# Patient Record
Sex: MALE | Race: ASIAN | Employment: FULL TIME | URBAN - METROPOLITAN AREA
[De-identification: names, ages, dates, MRNs, and addresses within clinical notes are randomized per-mention and may not be internally consistent; named-entity substitution may affect disease eponyms.]

---

## 2021-02-04 ENCOUNTER — APPOINTMENT (OUTPATIENT)
Dept: CT IMAGING | Age: 61
DRG: 871 | End: 2021-02-04
Attending: EMERGENCY MEDICINE
Payer: COMMERCIAL

## 2021-02-04 ENCOUNTER — APPOINTMENT (OUTPATIENT)
Dept: GENERAL RADIOLOGY | Age: 61
DRG: 871 | End: 2021-02-04
Attending: EMERGENCY MEDICINE
Payer: COMMERCIAL

## 2021-02-04 ENCOUNTER — HOSPITAL ENCOUNTER (INPATIENT)
Age: 61
LOS: 5 days | Discharge: HOME | DRG: 871 | End: 2021-02-10
Attending: EMERGENCY MEDICINE | Admitting: HOSPITALIST
Payer: COMMERCIAL

## 2021-02-04 DIAGNOSIS — J96.01 ACUTE RESPIRATORY FAILURE WITH HYPOXIA (HCC): ICD-10-CM

## 2021-02-04 DIAGNOSIS — U07.1 2019 NOVEL CORONAVIRUS DISEASE (COVID-19): ICD-10-CM

## 2021-02-04 DIAGNOSIS — J96.01 ACUTE HYPOXEMIC RESPIRATORY FAILURE (HCC): ICD-10-CM

## 2021-02-04 DIAGNOSIS — J98.4 CAVITARY LUNG DISEASE: ICD-10-CM

## 2021-02-04 DIAGNOSIS — J98.4 CAVITARY LESION OF LUNG: ICD-10-CM

## 2021-02-04 DIAGNOSIS — J12.82 PNEUMONIA DUE TO COVID-19 VIRUS: Primary | ICD-10-CM

## 2021-02-04 DIAGNOSIS — U07.1 PNEUMONIA DUE TO COVID-19 VIRUS: Primary | ICD-10-CM

## 2021-02-04 LAB
25(OH)D3 SERPL-MCNC: 9.9 NG/ML (ref 30–100)
ALBUMIN SERPL-MCNC: 3 G/DL (ref 3.5–5)
ALBUMIN/GLOB SERPL: 0.5 {RATIO} (ref 1.2–3.5)
ALP SERPL-CCNC: 116 U/L (ref 50–136)
ALT SERPL-CCNC: 41 U/L (ref 12–65)
ANION GAP SERPL CALC-SCNC: 7 MMOL/L (ref 7–16)
AST SERPL-CCNC: 15 U/L (ref 15–37)
BASOPHILS # BLD: 0.1 K/UL (ref 0–0.2)
BASOPHILS NFR BLD: 1 % (ref 0–2)
BILIRUB SERPL-MCNC: 0.6 MG/DL (ref 0.2–1.1)
BUN SERPL-MCNC: 12 MG/DL (ref 6–23)
CALCIUM SERPL-MCNC: 9.8 MG/DL (ref 8.3–10.4)
CHLORIDE SERPL-SCNC: 94 MMOL/L (ref 98–107)
CO2 SERPL-SCNC: 27 MMOL/L (ref 21–32)
CREAT SERPL-MCNC: 0.93 MG/DL (ref 0.8–1.5)
CRP SERPL-MCNC: 13.1 MG/DL (ref 0–0.9)
D DIMER PPP FEU-MCNC: 0.42 UG/ML(FEU)
DIFFERENTIAL METHOD BLD: ABNORMAL
EOSINOPHIL # BLD: 0 K/UL (ref 0–0.8)
EOSINOPHIL NFR BLD: 0 % (ref 0.5–7.8)
ERYTHROCYTE [DISTWIDTH] IN BLOOD BY AUTOMATED COUNT: 13.4 % (ref 11.9–14.6)
FERRITIN SERPL-MCNC: 723 NG/ML (ref 8–388)
FIBRINOGEN PPP-MCNC: 859 MG/DL (ref 190–501)
GLOBULIN SER CALC-MCNC: 6.1 G/DL (ref 2.3–3.5)
GLUCOSE SERPL-MCNC: 257 MG/DL (ref 65–100)
HCT VFR BLD AUTO: 44.5 % (ref 41.1–50.3)
HGB BLD-MCNC: 14.1 G/DL (ref 13.6–17.2)
IMM GRANULOCYTES # BLD AUTO: 0.5 K/UL (ref 0–0.5)
IMM GRANULOCYTES NFR BLD AUTO: 5 % (ref 0–5)
LACTATE SERPL-SCNC: 2 MMOL/L (ref 0.4–2)
LDH SERPL L TO P-CCNC: 249 U/L (ref 100–190)
LYMPHOCYTES # BLD: 0.8 K/UL (ref 0.5–4.6)
LYMPHOCYTES NFR BLD: 7 % (ref 13–44)
MAGNESIUM SERPL-MCNC: 2.2 MG/DL (ref 1.8–2.4)
MCH RBC QN AUTO: 23.9 PG (ref 26.1–32.9)
MCHC RBC AUTO-ENTMCNC: 31.7 G/DL (ref 31.4–35)
MCV RBC AUTO: 75.6 FL (ref 79.6–97.8)
MONOCYTES # BLD: 0.8 K/UL (ref 0.1–1.3)
MONOCYTES NFR BLD: 7 % (ref 4–12)
NEUTS SEG # BLD: 8.6 K/UL (ref 1.7–8.2)
NEUTS SEG NFR BLD: 79 % (ref 43–78)
NRBC # BLD: 0 K/UL (ref 0–0.2)
PLATELET # BLD AUTO: 484 K/UL (ref 150–450)
PMV BLD AUTO: 9.2 FL (ref 9.4–12.3)
POTASSIUM SERPL-SCNC: 3.9 MMOL/L (ref 3.5–5.1)
PROCALCITONIN SERPL-MCNC: 0.12 NG/ML
PROT SERPL-MCNC: 9.1 G/DL (ref 6.3–8.2)
RBC # BLD AUTO: 5.89 M/UL (ref 4.23–5.6)
SODIUM SERPL-SCNC: 128 MMOL/L (ref 138–145)
WBC # BLD AUTO: 10.8 K/UL (ref 4.3–11.1)

## 2021-02-04 PROCEDURE — 74011250637 HC RX REV CODE- 250/637: Performed by: EMERGENCY MEDICINE

## 2021-02-04 PROCEDURE — 83605 ASSAY OF LACTIC ACID: CPT

## 2021-02-04 PROCEDURE — 87635 SARS-COV-2 COVID-19 AMP PRB: CPT

## 2021-02-04 PROCEDURE — 87641 MR-STAPH DNA AMP PROBE: CPT

## 2021-02-04 PROCEDURE — 74011000636 HC RX REV CODE- 636: Performed by: EMERGENCY MEDICINE

## 2021-02-04 PROCEDURE — 87040 BLOOD CULTURE FOR BACTERIA: CPT

## 2021-02-04 PROCEDURE — 86140 C-REACTIVE PROTEIN: CPT

## 2021-02-04 PROCEDURE — 80053 COMPREHEN METABOLIC PANEL: CPT

## 2021-02-04 PROCEDURE — 84145 PROCALCITONIN (PCT): CPT

## 2021-02-04 PROCEDURE — 71045 X-RAY EXAM CHEST 1 VIEW: CPT

## 2021-02-04 PROCEDURE — 82306 VITAMIN D 25 HYDROXY: CPT

## 2021-02-04 PROCEDURE — 83036 HEMOGLOBIN GLYCOSYLATED A1C: CPT

## 2021-02-04 PROCEDURE — 85379 FIBRIN DEGRADATION QUANT: CPT

## 2021-02-04 PROCEDURE — 82728 ASSAY OF FERRITIN: CPT

## 2021-02-04 PROCEDURE — 96375 TX/PRO/DX INJ NEW DRUG ADDON: CPT

## 2021-02-04 PROCEDURE — 83735 ASSAY OF MAGNESIUM: CPT

## 2021-02-04 PROCEDURE — 83615 LACTATE (LD) (LDH) ENZYME: CPT

## 2021-02-04 PROCEDURE — 86901 BLOOD TYPING SEROLOGIC RH(D): CPT

## 2021-02-04 PROCEDURE — 99285 EMERGENCY DEPT VISIT HI MDM: CPT

## 2021-02-04 PROCEDURE — 74011250636 HC RX REV CODE- 250/636: Performed by: EMERGENCY MEDICINE

## 2021-02-04 PROCEDURE — 71260 CT THORAX DX C+: CPT

## 2021-02-04 PROCEDURE — 85025 COMPLETE CBC W/AUTO DIFF WBC: CPT

## 2021-02-04 PROCEDURE — 74011000258 HC RX REV CODE- 258: Performed by: EMERGENCY MEDICINE

## 2021-02-04 PROCEDURE — 85384 FIBRINOGEN ACTIVITY: CPT

## 2021-02-04 RX ORDER — ACETAMINOPHEN 500 MG
1000 TABLET ORAL
Status: COMPLETED | OUTPATIENT
Start: 2021-02-04 | End: 2021-02-04

## 2021-02-04 RX ORDER — DEXAMETHASONE SODIUM PHOSPHATE 100 MG/10ML
10 INJECTION INTRAMUSCULAR; INTRAVENOUS
Status: COMPLETED | OUTPATIENT
Start: 2021-02-04 | End: 2021-02-04

## 2021-02-04 RX ORDER — SODIUM CHLORIDE 0.9 % (FLUSH) 0.9 %
10 SYRINGE (ML) INJECTION
Status: COMPLETED | OUTPATIENT
Start: 2021-02-04 | End: 2021-02-04

## 2021-02-04 RX ADMIN — Medication 10 ML: at 22:41

## 2021-02-04 RX ADMIN — DEXAMETHASONE SODIUM PHOSPHATE 10 MG: 10 INJECTION INTRAMUSCULAR; INTRAVENOUS at 22:47

## 2021-02-04 RX ADMIN — SODIUM CHLORIDE 100 ML: 900 INJECTION, SOLUTION INTRAVENOUS at 22:41

## 2021-02-04 RX ADMIN — IOPAMIDOL 100 ML: 755 INJECTION, SOLUTION INTRAVENOUS at 22:41

## 2021-02-04 RX ADMIN — ACETAMINOPHEN 1000 MG: 500 TABLET ORAL at 21:03

## 2021-02-05 PROBLEM — J96.01 ACUTE HYPOXEMIC RESPIRATORY FAILURE (HCC): Status: ACTIVE | Noted: 2021-02-05

## 2021-02-05 PROBLEM — J98.4 CAVITARY LUNG DISEASE: Status: ACTIVE | Noted: 2021-02-05

## 2021-02-05 PROBLEM — A41.9 SEPSIS (HCC): Status: ACTIVE | Noted: 2021-02-05

## 2021-02-05 PROBLEM — U07.1 2019 NOVEL CORONAVIRUS DISEASE (COVID-19): Status: ACTIVE | Noted: 2021-02-05

## 2021-02-05 LAB
ABO + RH BLD: NORMAL
ALBUMIN SERPL-MCNC: 2.4 G/DL (ref 3.5–5)
ALBUMIN/GLOB SERPL: 0.5 {RATIO} (ref 1.2–3.5)
ALP SERPL-CCNC: 91 U/L (ref 50–136)
ALT SERPL-CCNC: 29 U/L (ref 12–65)
ANION GAP SERPL CALC-SCNC: 6 MMOL/L (ref 7–16)
APTT PPP: 34.7 SEC (ref 24.1–35.1)
AST SERPL-CCNC: 12 U/L (ref 15–37)
BACTERIA SPEC CULT: NORMAL
BASOPHILS # BLD: 0.1 K/UL (ref 0–0.2)
BASOPHILS NFR BLD: 1 % (ref 0–2)
BILIRUB SERPL-MCNC: 0.7 MG/DL (ref 0.2–1.1)
BLOOD GROUP ANTIBODIES SERPL: NORMAL
BUN SERPL-MCNC: 16 MG/DL (ref 6–23)
CALCIUM SERPL-MCNC: 9.3 MG/DL (ref 8.3–10.4)
CHLORIDE SERPL-SCNC: 99 MMOL/L (ref 98–107)
CO2 SERPL-SCNC: 26 MMOL/L (ref 21–32)
COVID-19 RAPID TEST, COVR: DETECTED
CREAT SERPL-MCNC: 0.77 MG/DL (ref 0.8–1.5)
CRP SERPL-MCNC: 12.1 MG/DL (ref 0–0.9)
D DIMER PPP FEU-MCNC: 0.34 UG/ML(FEU)
DIFFERENTIAL METHOD BLD: ABNORMAL
EOSINOPHIL # BLD: 0 K/UL (ref 0–0.8)
EOSINOPHIL NFR BLD: 0 % (ref 0.5–7.8)
ERYTHROCYTE [DISTWIDTH] IN BLOOD BY AUTOMATED COUNT: 13.5 % (ref 11.9–14.6)
EST. AVERAGE GLUCOSE BLD GHB EST-MCNC: 275 MG/DL
GLOBULIN SER CALC-MCNC: 5 G/DL (ref 2.3–3.5)
GLUCOSE BLD STRIP.AUTO-MCNC: 336 MG/DL (ref 65–100)
GLUCOSE BLD STRIP.AUTO-MCNC: 381 MG/DL (ref 65–100)
GLUCOSE BLD STRIP.AUTO-MCNC: 403 MG/DL (ref 65–100)
GLUCOSE BLD STRIP.AUTO-MCNC: 405 MG/DL (ref 65–100)
GLUCOSE SERPL-MCNC: 350 MG/DL (ref 65–100)
HBA1C MFR BLD: 11.2 % (ref 4.2–6.3)
HCT VFR BLD AUTO: 39.5 % (ref 41.1–50.3)
HGB BLD-MCNC: 12.8 G/DL (ref 13.6–17.2)
IMM GRANULOCYTES # BLD AUTO: 0.5 K/UL (ref 0–0.5)
IMM GRANULOCYTES NFR BLD AUTO: 6 % (ref 0–5)
INR PPP: 1.1
LYMPHOCYTES # BLD: 0.6 K/UL (ref 0.5–4.6)
LYMPHOCYTES NFR BLD: 7 % (ref 13–44)
MCH RBC QN AUTO: 24 PG (ref 26.1–32.9)
MCHC RBC AUTO-ENTMCNC: 32.4 G/DL (ref 31.4–35)
MCV RBC AUTO: 74.1 FL (ref 79.6–97.8)
MONOCYTES # BLD: 0.1 K/UL (ref 0.1–1.3)
MONOCYTES NFR BLD: 2 % (ref 4–12)
NEUTS SEG # BLD: 7 K/UL (ref 1.7–8.2)
NEUTS SEG NFR BLD: 85 % (ref 43–78)
NRBC # BLD: 0 K/UL (ref 0–0.2)
PLATELET # BLD AUTO: 384 K/UL (ref 150–450)
PMV BLD AUTO: 9.9 FL (ref 9.4–12.3)
POTASSIUM SERPL-SCNC: 4.7 MMOL/L (ref 3.5–5.1)
PROCALCITONIN SERPL-MCNC: 0.07 NG/ML
PROT SERPL-MCNC: 7.4 G/DL (ref 6.3–8.2)
PROTHROMBIN TIME: 14.5 SEC (ref 12.5–14.7)
RBC # BLD AUTO: 5.33 M/UL (ref 4.23–5.6)
SARS-COV-2, COV2: NORMAL
SERVICE CMNT-IMP: NORMAL
SODIUM SERPL-SCNC: 131 MMOL/L (ref 138–145)
SOURCE, COVRS: ABNORMAL
SPECIMEN EXP DATE BLD: NORMAL
TROPONIN-HIGH SENSITIVITY: 4.6 PG/ML (ref 0–14)
WBC # BLD AUTO: 8.3 K/UL (ref 4.3–11.1)

## 2021-02-05 PROCEDURE — 77030027138 HC INCENT SPIROMETER -A

## 2021-02-05 PROCEDURE — 36415 COLL VENOUS BLD VENIPUNCTURE: CPT

## 2021-02-05 PROCEDURE — 74011636637 HC RX REV CODE- 636/637: Performed by: INTERNAL MEDICINE

## 2021-02-05 PROCEDURE — 74011250637 HC RX REV CODE- 250/637: Performed by: HOSPITALIST

## 2021-02-05 PROCEDURE — 86480 TB TEST CELL IMMUN MEASURE: CPT

## 2021-02-05 PROCEDURE — 74011636637 HC RX REV CODE- 636/637: Performed by: HOSPITALIST

## 2021-02-05 PROCEDURE — 86140 C-REACTIVE PROTEIN: CPT

## 2021-02-05 PROCEDURE — 84484 ASSAY OF TROPONIN QUANT: CPT

## 2021-02-05 PROCEDURE — 85730 THROMBOPLASTIN TIME PARTIAL: CPT

## 2021-02-05 PROCEDURE — 65270000029 HC RM PRIVATE

## 2021-02-05 PROCEDURE — 85025 COMPLETE CBC W/AUTO DIFF WBC: CPT

## 2021-02-05 PROCEDURE — 85379 FIBRIN DEGRADATION QUANT: CPT

## 2021-02-05 PROCEDURE — 96374 THER/PROPH/DIAG INJ IV PUSH: CPT

## 2021-02-05 PROCEDURE — 74011250636 HC RX REV CODE- 250/636: Performed by: HOSPITALIST

## 2021-02-05 PROCEDURE — 80053 COMPREHEN METABOLIC PANEL: CPT

## 2021-02-05 PROCEDURE — 2709999900 HC NON-CHARGEABLE SUPPLY

## 2021-02-05 PROCEDURE — 82962 GLUCOSE BLOOD TEST: CPT

## 2021-02-05 PROCEDURE — 87040 BLOOD CULTURE FOR BACTERIA: CPT

## 2021-02-05 PROCEDURE — 99223 1ST HOSP IP/OBS HIGH 75: CPT | Performed by: INTERNAL MEDICINE

## 2021-02-05 PROCEDURE — 84145 PROCALCITONIN (PCT): CPT

## 2021-02-05 PROCEDURE — 83520 IMMUNOASSAY QUANT NOS NONAB: CPT

## 2021-02-05 PROCEDURE — 87385 HISTOPLASMA CAPSUL AG IA: CPT

## 2021-02-05 PROCEDURE — 85610 PROTHROMBIN TIME: CPT

## 2021-02-05 PROCEDURE — 74011000258 HC RX REV CODE- 258: Performed by: HOSPITALIST

## 2021-02-05 RX ORDER — POLYETHYLENE GLYCOL 3350 17 G/17G
17 POWDER, FOR SOLUTION ORAL DAILY PRN
Status: DISCONTINUED | OUTPATIENT
Start: 2021-02-05 | End: 2021-02-10 | Stop reason: HOSPADM

## 2021-02-05 RX ORDER — SODIUM CHLORIDE 0.9 % (FLUSH) 0.9 %
5-40 SYRINGE (ML) INJECTION EVERY 8 HOURS
Status: DISCONTINUED | OUTPATIENT
Start: 2021-02-05 | End: 2021-02-10 | Stop reason: HOSPADM

## 2021-02-05 RX ORDER — DEXTROSE 50 % IN WATER (D50W) INTRAVENOUS SYRINGE
25-50 AS NEEDED
Status: DISCONTINUED | OUTPATIENT
Start: 2021-02-05 | End: 2021-02-10 | Stop reason: HOSPADM

## 2021-02-05 RX ORDER — MAG HYDROX/ALUMINUM HYD/SIMETH 200-200-20
30 SUSPENSION, ORAL (FINAL DOSE FORM) ORAL
Status: DISCONTINUED | OUTPATIENT
Start: 2021-02-05 | End: 2021-02-10 | Stop reason: HOSPADM

## 2021-02-05 RX ORDER — ZOLPIDEM TARTRATE 5 MG/1
5 TABLET ORAL
Status: DISCONTINUED | OUTPATIENT
Start: 2021-02-05 | End: 2021-02-10 | Stop reason: HOSPADM

## 2021-02-05 RX ORDER — FAMOTIDINE 20 MG/1
40 TABLET, FILM COATED ORAL DAILY
Status: DISCONTINUED | OUTPATIENT
Start: 2021-02-05 | End: 2021-02-10 | Stop reason: HOSPADM

## 2021-02-05 RX ORDER — INSULIN LISPRO 100 [IU]/ML
4 INJECTION, SOLUTION INTRAVENOUS; SUBCUTANEOUS ONCE
Status: COMPLETED | OUTPATIENT
Start: 2021-02-05 | End: 2021-02-05

## 2021-02-05 RX ORDER — ENOXAPARIN SODIUM 100 MG/ML
40 INJECTION SUBCUTANEOUS DAILY
Status: DISCONTINUED | OUTPATIENT
Start: 2021-02-05 | End: 2021-02-10 | Stop reason: HOSPADM

## 2021-02-05 RX ORDER — ALBUTEROL SULFATE 90 UG/1
2 AEROSOL, METERED RESPIRATORY (INHALATION)
Status: DISCONTINUED | OUTPATIENT
Start: 2021-02-05 | End: 2021-02-10 | Stop reason: HOSPADM

## 2021-02-05 RX ORDER — ERGOCALCIFEROL 1.25 MG/1
50000 CAPSULE ORAL ONCE
Status: COMPLETED | OUTPATIENT
Start: 2021-02-05 | End: 2021-02-05

## 2021-02-05 RX ORDER — ALBUTEROL SULFATE 0.83 MG/ML
2.5 SOLUTION RESPIRATORY (INHALATION) EVERY 12 HOURS
Status: DISCONTINUED | OUTPATIENT
Start: 2021-02-05 | End: 2021-02-05

## 2021-02-05 RX ORDER — PROMETHAZINE HYDROCHLORIDE 25 MG/1
12.5 TABLET ORAL
Status: DISCONTINUED | OUTPATIENT
Start: 2021-02-05 | End: 2021-02-10 | Stop reason: HOSPADM

## 2021-02-05 RX ORDER — INSULIN GLARGINE 100 [IU]/ML
0.2 INJECTION, SOLUTION SUBCUTANEOUS DAILY
Status: DISCONTINUED | OUTPATIENT
Start: 2021-02-05 | End: 2021-02-10 | Stop reason: HOSPADM

## 2021-02-05 RX ORDER — ONDANSETRON 2 MG/ML
4 INJECTION INTRAMUSCULAR; INTRAVENOUS
Status: DISCONTINUED | OUTPATIENT
Start: 2021-02-05 | End: 2021-02-10 | Stop reason: HOSPADM

## 2021-02-05 RX ORDER — ACETAMINOPHEN 325 MG/1
650 TABLET ORAL
Status: DISCONTINUED | OUTPATIENT
Start: 2021-02-05 | End: 2021-02-10 | Stop reason: HOSPADM

## 2021-02-05 RX ORDER — MELATONIN
1000 DAILY
Status: DISCONTINUED | OUTPATIENT
Start: 2021-02-05 | End: 2021-02-10 | Stop reason: HOSPADM

## 2021-02-05 RX ORDER — SODIUM CHLORIDE FOR INHALATION 3 %
4 VIAL, NEBULIZER (ML) INHALATION EVERY 12 HOURS
Status: DISPENSED | OUTPATIENT
Start: 2021-02-05 | End: 2021-02-07

## 2021-02-05 RX ORDER — GUAIFENESIN 600 MG/1
600 TABLET, EXTENDED RELEASE ORAL EVERY 12 HOURS
Status: DISCONTINUED | OUTPATIENT
Start: 2021-02-05 | End: 2021-02-10 | Stop reason: HOSPADM

## 2021-02-05 RX ORDER — ASCORBIC ACID 500 MG
500 TABLET ORAL DAILY
Status: DISCONTINUED | OUTPATIENT
Start: 2021-02-05 | End: 2021-02-10

## 2021-02-05 RX ORDER — ZINC SULFATE 50(220)MG
1 CAPSULE ORAL DAILY
Status: DISCONTINUED | OUTPATIENT
Start: 2021-02-05 | End: 2021-02-10

## 2021-02-05 RX ORDER — UREA 10 %
2 LOTION (ML) TOPICAL DAILY
Status: DISCONTINUED | OUTPATIENT
Start: 2021-02-05 | End: 2021-02-10

## 2021-02-05 RX ORDER — SODIUM CHLORIDE 0.9 % (FLUSH) 0.9 %
5-40 SYRINGE (ML) INJECTION AS NEEDED
Status: DISCONTINUED | OUTPATIENT
Start: 2021-02-05 | End: 2021-02-10 | Stop reason: HOSPADM

## 2021-02-05 RX ORDER — DEXAMETHASONE 4 MG/1
6 TABLET ORAL DAILY
Status: DISCONTINUED | OUTPATIENT
Start: 2021-02-05 | End: 2021-02-10 | Stop reason: HOSPADM

## 2021-02-05 RX ORDER — ACETAMINOPHEN 650 MG/1
650 SUPPOSITORY RECTAL
Status: DISCONTINUED | OUTPATIENT
Start: 2021-02-05 | End: 2021-02-10 | Stop reason: HOSPADM

## 2021-02-05 RX ORDER — BENZONATATE 100 MG/1
100 CAPSULE ORAL
Status: DISCONTINUED | OUTPATIENT
Start: 2021-02-05 | End: 2021-02-10 | Stop reason: HOSPADM

## 2021-02-05 RX ORDER — DEXTROSE 40 %
15 GEL (GRAM) ORAL AS NEEDED
Status: DISCONTINUED | OUTPATIENT
Start: 2021-02-05 | End: 2021-02-10 | Stop reason: HOSPADM

## 2021-02-05 RX ORDER — INSULIN LISPRO 100 [IU]/ML
INJECTION, SOLUTION INTRAVENOUS; SUBCUTANEOUS
Status: DISCONTINUED | OUTPATIENT
Start: 2021-02-05 | End: 2021-02-10 | Stop reason: HOSPADM

## 2021-02-05 RX ADMIN — GUAIFENESIN 600 MG: 600 TABLET ORAL at 20:00

## 2021-02-05 RX ADMIN — INSULIN LISPRO 8 UNITS: 100 INJECTION, SOLUTION INTRAVENOUS; SUBCUTANEOUS at 09:06

## 2021-02-05 RX ADMIN — INSULIN LISPRO 10 UNITS: 100 INJECTION, SOLUTION INTRAVENOUS; SUBCUTANEOUS at 12:41

## 2021-02-05 RX ADMIN — Medication 10 ML: at 07:30

## 2021-02-05 RX ADMIN — INSULIN LISPRO 10 UNITS: 100 INJECTION, SOLUTION INTRAVENOUS; SUBCUTANEOUS at 18:07

## 2021-02-05 RX ADMIN — INSULIN LISPRO 10 UNITS: 100 INJECTION, SOLUTION INTRAVENOUS; SUBCUTANEOUS at 22:00

## 2021-02-05 RX ADMIN — DEXAMETHASONE 6 MG: 4 TABLET ORAL at 08:48

## 2021-02-05 RX ADMIN — AMPICILLIN SODIUM AND SULBACTAM SODIUM 3 G: 2; 1 INJECTION, POWDER, FOR SOLUTION INTRAMUSCULAR; INTRAVENOUS at 13:52

## 2021-02-05 RX ADMIN — Medication 10 ML: at 13:52

## 2021-02-05 RX ADMIN — AMPICILLIN SODIUM AND SULBACTAM SODIUM 3 G: 2; 1 INJECTION, POWDER, FOR SOLUTION INTRAMUSCULAR; INTRAVENOUS at 22:04

## 2021-02-05 RX ADMIN — ENOXAPARIN SODIUM 40 MG: 40 INJECTION SUBCUTANEOUS at 08:48

## 2021-02-05 RX ADMIN — ERGOCALCIFEROL 50000 UNITS: 1.25 CAPSULE ORAL at 11:39

## 2021-02-05 RX ADMIN — LACTOBACILLUS TAB 2 TABLET: TAB at 08:48

## 2021-02-05 RX ADMIN — INSULIN LISPRO 4 UNITS: 100 INJECTION, SOLUTION INTRAVENOUS; SUBCUTANEOUS at 22:00

## 2021-02-05 RX ADMIN — Medication 10 ML: at 22:00

## 2021-02-05 RX ADMIN — AMPICILLIN SODIUM AND SULBACTAM SODIUM 3 G: 2; 1 INJECTION, POWDER, FOR SOLUTION INTRAMUSCULAR; INTRAVENOUS at 00:00

## 2021-02-05 NOTE — PROGRESS NOTES
Assumed care of the patient. Off going report given by Tone Gallego. The patient is AO x 4 at this time and is resting in bed. IV access: PIV is saline locked  Oxygen needs: 3L via CO2 N  Pain assessment: Denies pain at this time. Safety: Bed is low and call light is within reach. Patient understands to call for assistance. Patient is being ruled out for TB and was Covid + 1/28/21 so he is in a negative pressure room and on airborne and droplet precautions. Disaster charting initiated.

## 2021-02-05 NOTE — H&P
[facility]  History and Physical    Patient ID:  Georgette Rouse  male  1960  [id number]    Admission Date: 2/4/2021  Chief Complaint: Fatigue, shortness of breath, nausea vomiting. Reason for Admission: Acute hypoxemic respiratory failure    ASSESSMENT & PLAN:  HPI: 82-year-old [place] ([facility]) male with past medical history of  Myasthenia gravis. Note: He immigrated from [facility] 2-1/2 years ago prior to immigration he had received all prior  Immigration vaccinations required by the [place]. When he was being treated for myasthenia gravis from [id number] prior to initiation of steroids and immunosuppressive medications he had a bronchoscopy performed which ruled out tuberculosis. He has not been followed by a primary care physician [place], he works at [employer] and has been working throughout the [facility] and has been wearing appropriate PPE. He and his wife have been sick with viral syndrome symptoms for greater than 10 days, he did check a home COVID-19 test which was collected 01/28 and resulted positive on 01/30. Since his symptoms were worsening he did go to the urgent care on January 29, 2021 where he was prescribed Flonase and Medrol Dosepak. Additionally he has finished a course of 7 days of clindamycin for a right-sided oral/dental infection. He states his wife symptoms are now 50 to 60% improved, however since he has had progression worsening of shortness of breath, and today with loose stools, and lack of oral intake MS was called to his residence and noted oxygen saturations of 75 and they placed him on supplemental oxygen via nonrebreather mask where his oxygen saturations improved 100%.   In the emergency department he had a CT scan of the chest with contrast performed which did not reveal any pulmonary embolism however it did show  Bilateral cavitary lesions, as well as bilateral basilar groundglass opacifications consistent with COVID-19 viral pneumonia. Labs are significant for a serum sodium of 128, potassium 3.9, chloride 94, bicarbonate 27, BUN 12, creatinine 0.93, glucose of 257. Elevated D-dimer 0.42, lactic acid 2.0, CRP 13.1 and a ferritin of 723. Blood cultures were collected. He was started on IV dexamethasone, and IV Unasyn.       Acute hypoxemic respiratory failure secondary to bilateral COVID-19 viral pneumonia  With cavitary lesions  Sepsis present on admission secondary to SARS-CoV-2, possible superimposed bacterial/fungal infection with cavitary lesion versus COVID-19 causing cavitary lesion  Initial Symptom onset ~ 10 Days ago   COVID-19 positive 01/28 conferring mid to late phase COVID 19 Disease   Check MRSA nares, Aspergillus glucomannan, histoplasmosis glucomannan, sputum Gram stain and culture, blood cultures collected, ANCA, Quantiferon  Appropriate droplet, aerosolized, contact isolation precautions ordered  Decadron 6 mg daily for up to 10 doses  Lovenox 40 mg subcutaneous daily  Mucinex, Tessalon  Incentive spirometry, Acapella, Prone positioning advised, prone as tolerated orders placed  Zinc sulfate, ascorbic acid, vitamin D 50,000 units x1, 1000 units thereafter , Pepcid  Systemic IV antibiotics with  IV Unasyn  Oxygen saturations 95%, oxygen requirements 4 L nasal cannula, visibly dyspneic, though tolerating/no signs of respiratory fatigue at this time  [facility] pulmonology consult evaluation of cavitary pulmonary lesions    Electrolyte abnormalities  Hyponatremia  Serum sodium 128  Start IV normal saline 100 cc an hour x1 L, monitor serum sodium    Hyperglycemia  He was given Medrol Dosepak which can be contributing to elevated blood glucose, although since he has not had medical follow-up for over 2-1/2 years, most likely new diagnosis of prior non diagnosed diabetes mellitus  Check hemoglobin A1c  Start point-of-care glucose checks with insulin sliding scale    Tooth abscess  Consequently also being treated with Unasyn, note prior to admission he finished a course of clindamycin, and he believes he is recovered from tooth abscess      Disposition: Inpatient   Diet: Consistent carbohydrate diet  VTE ppx: Lovenox  SC   GI ppx: Pepcid   CODE STATUS: Full   Surrogate decision-maker: His wife Ismael Singh        No Known Allergies    None       No past medical history on file. No past surgical history on file. Social History     Tobacco Use    Smoking status: Not on file   Substance Use Topics    Alcohol use: Not on file       FAMILY HISTORY:    He reports no known history of medical problems       REVIEW OF SYSTEMS:  A 14 point review of systems was taken and pertinent positive as per HPI.       PHYSICAL EXAM:    Visit Vitals  /72 (BP 1 Location: Left upper arm, BP Patient Position: At rest)   Pulse (!) 117   Temp (!) 100.7 °F (38.2 °C)   Resp 26   Ht 5' 10\" (1.778 m)   Wt 72.6 kg (160 lb)   SpO2 91%   BMI 22.96 kg/m²       General: No acute distress, speaking in full sentence though visibly dyspneic  HEENT: Pupils equal and reactive to light and accommodation, oropharynx is clear   Neck: Supple, no lymphadenopathy, no JVD   Lungs: Clear to auscultation bilaterally   Cardiovascular: Regular rate and rhythm with normal S1 and S2   Abdomen: Soft, nontender, nondistended, normoactive bowel sounds   Extremities: No cyanosis clubbing or edema   Neuro: Nonfocal, A&O x3   Psych: Normal mood and affect     Intake/Output last 3 shifts:        Labs:  CMP:   Lab Results   Component Value Date/Time     (L) 02/04/2021 08:16 PM    K 3.9 02/04/2021 08:16 PM    CL 94 (L) 02/04/2021 08:16 PM    CO2 27 02/04/2021 08:16 PM    AGAP 7 02/04/2021 08:16 PM     (H) 02/04/2021 08:16 PM    BUN 12 02/04/2021 08:16 PM    CREA 0.93 02/04/2021 08:16 PM    GFRAA >60 02/04/2021 08:16 PM    GFRNA >60 02/04/2021 08:16 PM    CA 9.8 02/04/2021 08:16 PM    MG 2.2 02/04/2021 08:16 PM    ALB 3.0 (L) 02/04/2021 08:16 PM    TBILI 0.6 02/04/2021 08:16 PM    TP 9.1 (H) 02/04/2021 08:16 PM    GLOB 6.1 (H) 02/04/2021 08:16 PM    AGRAT 0.5 (L) 02/04/2021 08:16 PM    ALT 41 02/04/2021 08:16 PM         CBC:    Lab Results   Component Value Date/Time    WBC 10.8 02/04/2021 08:16 PM    HGB 14.1 02/04/2021 08:16 PM    HCT 44.5 02/04/2021 08:16 PM     (H) 02/04/2021 08:16 PM       No results found for: INR, PTMR, PTP, PT1, PT2, INREXT    ABG:  No results found for: PH, PHI, PCO2, PCO2I, PO2, PO2I, HCO3, HCO3I, FIO2, FIO2I        No results found for: CPK, RCK1, RCK2, RCK3, RCK4, CKMB, CKNDX, CKND1, TROPT, TROIQ, BNPP, BNP      Imaging & Other Studies:    XR Results (maximum last 3): Results from [place] encounter on 02/04/21   XR CHEST PORT    Narrative EXAM: XR CHEST PORT    INDICATION: shob    COMPARISON: None. FINDINGS: A portable AP radiograph of the chest was obtained at 2038 hours. The  patient is on a cardiac monitor. Bilateral airspace disease with evidence of  cavitation in both upper lobes. . The cardiac and mediastinal contours and  pulmonary vascularity are normal.  The bones and soft tissues are grossly within  normal limits. Impression Bilateral pneumonia with evidence of cavitation in both upper lobes. CT Results (maximum last 3): Results from [place] encounter on 02/04/21   CT CHEST W CONT    Narrative CT OF THE CHEST WITH CONTRAST    HISTORY: Bilateral cavitary lung lesions    COMPARISON: Chest x-ray dated 2/4/2021    TECHNIQUE: A helical acquisition was performed through the chest utilizing 0.4LS  slice thickness during the intravenous infusion of 100 cc of Isovue-370. Coronal  and sagittal reformats were obtained. Dose reduction techniques used: Automated exposure control, adjustment of the  mAs and/or kVp according to patient's size, and iterative reconstruction  techniques. FINDINGS:  *  PLEURA / PERICARDIUM: Within normal limits. *  LUNGS: Bilateral upper lobe cavitary lung lesions. One in particular in the  left upper lobe on image number 31 appears to represent a cavitating nodule  measuring 1.4 x 1.3 cm. I lateral groundglass opacities primarily in the lower  lobes. *  LATONIA / MEDIASTINUM: Within normal limits. *  TRACHEOBRONCHIAL TREE: Within normal limits. *  AORTA/PULMONARY VESSELS: Within normal limits. *  HEART: Within normal limits. *  CORONARY ARTERIES: No coronary artery calcification is seen. *  CHEST WALL/AXILLA: Within normal limits. *  VISUALIZED UPPER ABDOMEN: Within normal limits. *  SPINE / BONES: Within normal limits. *  ADDITIONAL COMMENTS: None. Impression Bilateral cavitating upper lobe lung nodules. The differential diagnosis is  broad but these could represent septic emboli, necrotizing pneumonia including  tuberculosis. Based on their appearance I would place neoplasm less likely. Also  consider polyangiitis such as Wegener's granulomatosis. Findings this with bilateral basilar Covid pneumonia. Date of Dictation: 2/4/2021 10:54 PM         MRI Results (maximum last 3): No results found for this or any previous visit. Nuclear Medicine Results (maximum last 3): No results found for this or any previous visit. US Results (maximum last 3): No results found for this or any previous visit. DEXA Results (maximum last 3): No results found for this or any previous visit. POPEYE Results (maximum last 3): No results found for this or any previous visit. IR Results (maximum last 3): No results found for this or any previous visit. VAS/US Results (maximum last 3): No results found for this or any previous visit. PET Results (maximum last 3): No results found for this or any previous visit. No results found for this or any previous visit. Active Problems: There are no active problems to display for this patient.         Reyes Hall MD  [facility] Service  2/4/2021 11:53 PM

## 2021-02-05 NOTE — CONSULTS
CONSULT NOTE    Junaid Scott    2/5/2021    Date of Admission:  2/4/2021    The patient's chart is reviewed and the patient is discussed with the staff. Subjective:     Patient is a 64 y.o. male seen and evaluated at the request of Dr. Rehana Day. He has PMH of Myasthenia gravis. Note: He immigrated from [facility] 2-1/2 years ago prior to immigration he had received all prior  Immigration vaccinations required by the [place]. When he was being treated for myasthenia gravis from 3767-5380 prior to initiation of steroids and immunosuppressive medications he had a bronchoscopy performed which ruled out tuberculosis. He has not been followed by a primary care physician [place], he works at [employer] and has been working throughout the pandemic and has been wearing appropriate PPE. He and his wife have been sick with viral syndrome symptoms for greater than 10 days, he did check a home COVID-19 test which was collected 01/28 and resulted positive on 01/30. Since his symptoms were worsening he did go to the urgent care on January 29, 2021 where he was prescribed Flonase and Medrol Dosepak. Additionally he has finished a course of 7 days of clindamycin for a right-sided oral/dental infection. He states his wife symptoms are now 50 to 60% improved, however since he has had progression worsening of shortness of breath, and today with loose stools, and lack of oral intake MS was called to his residence and noted oxygen saturations of 75 and they placed him on supplemental oxygen via nonrebreather mask where his oxygen saturations improved 100%. In the emergency department he had a CT scan of the chest with contrast performed which did not reveal any pulmonary embolism however it did show  Bilateral cavitary lesions, as well as bilateral basilar groundglass opacifications consistent with COVID-19 viral pneumonia.   Labs are significant for a serum sodium of 128, potassium 3.9, chloride 94, bicarbonate 27, BUN 12, creatinine 0.93, glucose of 257. Elevated D-dimer 0.42, lactic acid 2.0, CRP 13.1 and a ferritin of 723. Blood cultures were collected. He was started on IV dexamethasone, and IV Unasyn. He states he feels slightly better today and is eating currently, but still feels short of breath going to the bathroom. Review of Systems  A comprehensive review of systems was negative except for that written in the HPI. Patient Active Problem List   Diagnosis Code    Acute hypoxemic respiratory failure ([facility] [address] J96.01    2019 novel coronavirus disease (COVID-19) U07.1    Cavitary lung disease J98.4     None     No past medical history on file. No past surgical history on file.   Social History     Socioeconomic History    Marital status:      Spouse name: Not on file    Number of children: Not on file    Years of education: Not on file    Highest education level: Not on file   Occupational History    Not on file   Social Needs    Financial resource strain: Not on file    Food insecurity     Worry: Not on file     Inability: Not on file    Transportation needs     Medical: Not on file     Non-medical: Not on file   Tobacco Use    Smoking status: Not on file   Substance and Sexual Activity    Alcohol use: Not on file    Drug use: Not on file    Sexual activity: Not on file   Lifestyle    Physical activity     Days per week: Not on file     Minutes per session: Not on file    Stress: Not on file   Relationships    Social connections     Talks on phone: Not on file     Gets together: Not on file     Attends [employer] service: Not on file     Active member of club or organization: Not on file     Attends meetings of clubs or organizations: Not on file     Relationship status: Not on file    Intimate partner violence     Fear of current or ex partner: Not on file     Emotionally abused: Not on file     Physically abused: Not on file     Forced sexual activity: Not on file   Other Topics Concern    Not on file   Social History Narrative    Not on file     No family history on file.   No Known Allergies    Current Facility-Administered Medications   Medication Dose Route Frequency    guaiFENesin ER (MUCINEX) tablet 600 mg  600 mg Oral Q12H    benzonatate (TESSALON) capsule 100 mg  100 mg Oral TID PRN    famotidine (PEPCID) tablet 40 mg  40 mg Oral DAILY    cholecalciferol (VITAMIN D3) (1000 Units /25 mcg) tablet 1,000 Units  1,000 Units Oral DAILY    zinc sulfate (ZINCATE) 220 (50) mg capsule 1 Cap  1 Cap Oral DAILY    ascorbic acid (vitamin C) (VITAMIN C) tablet 500 mg  500 mg Oral DAILY    dexAMETHasone (DECADRON) tablet 6 mg  6 mg Oral DAILY    sodium chloride (NS) flush 5-40 mL  5-40 mL IntraVENous Q8H    sodium chloride (NS) flush 5-40 mL  5-40 mL IntraVENous PRN    acetaminophen (TYLENOL) tablet 650 mg  650 mg Oral Q6H PRN    Or    acetaminophen (TYLENOL) suppository 650 mg  650 mg Rectal Q6H PRN    polyethylene glycol (MIRALAX) packet 17 g  17 g Oral DAILY PRN    promethazine (PHENERGAN) tablet 12.5 mg  12.5 mg Oral Q6H PRN    Or    ondansetron (ZOFRAN) injection 4 mg  4 mg IntraVENous Q6H PRN    enoxaparin (LOVENOX) injection 40 mg  40 mg SubCUTAneous DAILY    zolpidem (AMBIEN) tablet 5 mg  5 mg Oral QHS PRN    alum-mag hydroxide-simeth (MYLANTA) oral suspension 30 mL  30 mL Oral Q4H PRN    Lactobacillus Acidoph & Bulgar (FLORANEX) tablet 2 Tab  2 Tab Oral DAILY    insulin glargine (LANTUS) injection 15 Units  0.2 Units/kg SubCUTAneous DAILY    insulin lispro (HUMALOG) injection   SubCUTAneous AC&HS    dextrose 40% (GLUTOSE) oral gel 1 Tube  15 g Oral PRN    glucagon (GLUCAGEN) injection 1 mg  1 mg IntraMUSCular PRN    dextrose (D50W) injection syrg 12.5-25 g  25-50 mL IntraVENous PRN    sodium chloride 3% hypertonic nebulizer soln  4 mL Nebulization Q12H    ampicillin-sulbactam (UNASYN) 3 g in 0.9% sodium chloride (MBP/ADV) 100 mL MBP  3 g IntraVENous Q6H     Objective:     Vitals:    02/05/21 0530 02/05/21 0650 02/05/21 0801 02/05/21 1144   BP: 126/71 122/81 117/78 113/89   Pulse: 90 87 85 83   Resp: 25 23 24 20   Temp:  98.6 °F (37 °C) 97.8 °F (36.6 °C) 97.6 °F (36.4 °C)   SpO2: 98% 90% 90% 96%   Weight:       Height:         PHYSICAL EXAM     Constitutional:  the patient is well developed and in no acute distress  EENMT:  Sclera clear, pupils equal, oral mucosa moist  Respiratory: mild wheezing expiration, no rales  Cardiovascular:  RRR without M,G,R  Gastrointestinal: soft and non-tender; with positive bowel sounds. Musculoskeletal: warm without cyanosis. There is no lower extremity edema. Skin:  no jaundice or rashes, no wounds   Neurologic: no gross neuro deficits     Psychiatric:  alert and oriented x 4    CT Chest:  Multiple cavitary lesions and some tree in bud infiltrates      Recent Labs     02/05/21  0531 02/04/21 2016   WBC 8.3 10.8   HGB 12.8* 14.1   HCT 39.5* 44.5    484*   INR 1.1  --      Recent Labs     02/05/21 0531 02/04/21 2016   * 128*   K 4.7 3.9   CL 99 94*   * 257*   CO2 26 27   BUN 16 12   CREA 0.77* 0.93   MG  --  2.2   CA 9.3 9.8   ALB 2.4* 3.0*   TBILI 0.7 0.6   ALT 29 41     No results for input(s): PH, PCO2, PO2, HCO3, PHI, PCO2I, PO2I, HCO3I in the last 72 hours. Recent Labs     02/04/21 2015   LAC 2.0     Assessment:  (Medical Decision Making)     Hospital Problems  Never Reviewed          Codes Class Noted POA    Acute hypoxemic respiratory failure (HCC) ICD-10-CM: J96.01  ICD-9-CM: 518.81  2/5/2021 Unknown        * (Principal) 2019 novel coronavirus disease (COVID-19) ICD-10-CM: U07.1  ICD-9-CM: 079.89  2/5/2021 Unknown        Cavitary lung disease ICD-10-CM: J98.4  ICD-9-CM: 518.89  2/5/2021 Unknown            65 y/o female with MS admitted with COVID pneumonia, complicated with cavitary lung disease and tree in bud opacities probably not related to COVID.  Differential includes mixed infection from recent tooth abscess, TB, fungal or autoimmune. Malignancy is possible, but appears less likely based on appearance  Plan:  (Medical Decision Making)     --Agree with steroids for COVID  --Agree with Unasyn for possible anaerobic aspiration cavitary pneumonia related to recent tooth infection  --add 3% and albuterol BID for induced sputum (and some wheezing)  --check AFB x 3 Q12H with induced sputum  --check fungal and respiratory cultures  --check ANNABELLA, ANCA, Anti-GBM  --it is also possible these are old chronic changes, but no available images for comparison and he doesn't remember any abnormalities previously. --If unable to produce sputum will consider bronch on Monday. More than 50% of the time documented was spent in face-to-face contact with the patient and in the care of the patient on the floor/unit where the patient is located. Thank you very much for this referral.  We appreciate the opportunity to participate in this patient's care. Will follow along with above stated plan.     Marek Em MD

## 2021-02-05 NOTE — ACP (ADVANCE CARE PLANNING)
Advance Care Planning     Advance Care Planning Activator (Inpatient)  Conversation Note      Date of ACP Conversation: 02/05/21     Conversation Conducted with:   Patient with Decision Making Capacity    ACP Activator: Geni Valerio RN        Health Care Decision Maker:    Current Designated Health Care Decision Maker:     Primary Decision Maker: Patrizia    Secondary Decision Maker: Eva Lara -  - [phone]  Click here to complete [place] including selection of the Healthcare Decision Maker Relationship (ie \"Primary\")  Today we documented Decision Maker(s) consistent with Legal Next of Kin hierarchy. Care Preferences    Ventilation: \"If you were in your present state of health and suddenly became very ill and were unable to breathe on your own, what would your preference be about the use of a ventilator (breathing machine) if it were available to you? \"      If patient would desire the use of a ventilator (breathing machine), answer \"yes\", if not \"no\":yes    \"If your health worsens and it becomes clear that your chance of recovery is unlikely, what would your preference be about the use of a ventilator (breathing machine) if it were available to you? \"     Would the patient desire the use of a ventilator (breathing machine)? YES      Resuscitation  \"CPR works best to restart the heart when there is a sudden event, like a heart attack, in someone who is otherwise healthy. Unfortunately, CPR does not typically restart the heart for people who have serious health conditions or who are very sick. \"    \"In the event your heart stopped as a result of an underlying serious health condition, would you want attempts to be made to restart your heart (answer \"yes\" for attempt to resuscitate) or would you prefer a natural death (answer \"no\" for do not attempt to resuscitate)? \" yes      [x] Yes  [] No   Educated Patient / Decision Maker regarding differences between Advance Directives and portable DNR orders.     Length of ACP Conversation in minutes:  10    Conversation Outcomes:  [x] ACP discussion completed  [] Existing advance directive reviewed with patient; no changes to patient's previously recorded wishes     [] New Advance Directive completed   [] Portable Do Not Resuscitate prepared for Provider review and signature  [] POLST/POST/MOLST/MOST prepared for Provider review and signature      Follow-up plan:    [] Schedule follow-up conversation to continue planning  [] Referred individual to Provider for additional questions/concerns   [] Advised patient/agent/surrogate to review completed ACP document and update if needed with changes in condition, patient preferences or care setting     [x] This note routed to one or more involved healthcare providers

## 2021-02-05 NOTE — PROGRESS NOTES
MSN, CM:  Spoke with patient this AM about discharge planning. Patient lives with his wife Mary Ellen Kowalski" in a 1st floor apartment with 3 steps for entrance. Patient has a sister Trevor Gerardo" that lives in [place]. Patient is independent with all ADL's and requires no equipment for ambulation. Patient denies any home oxygen, HH or rehab in the past.  Patient works full time at [employer] and drives himself to work everyday. Patient has confirmed he has no PCP; [facility] referred. PT consulted for evaluation and recommendations. Case Management will continue to follow for discharge needs. Care Management Interventions  PCP Verified by CM: Yes(No PCP - [facility] to be referred.)  Mode of Transport at Discharge:  Other (see comment)(family to transport)  Transition of Care Consult (CM Consult): Discharge Planning  Physical Therapy Consult: Yes  Current Support Network: Own Home, Lives with Spouse  Confirm Follow Up Transport: Self  Freedom of Choice List was Provided with Basic Dialogue that Supports the Patient's Individualized Plan of Care/Goals, Treatment Preferences and Shares the Quality Data Associated with the Providers?: Yes  Discharge Location  Discharge Placement: Home

## 2021-02-05 NOTE — ED NOTES
TRANSFER - OUT REPORT:    Verbal report given to Brittany Wilson RN(name) on Nestor Sigala  being transferred to 8th floor(unit) for routine progression of care       Report consisted of patients Situation, Background, Assessment and   Recommendations(SBAR). Information from the following report(s) SBAR was reviewed with the receiving nurse. Lines:   Peripheral IV 02/04/21 Right Antecubital (Active)   Site Assessment Clean, dry, & intact 02/04/21 2013   Phlebitis Assessment 0 02/04/21 2013   Infiltration Assessment 0 02/04/21 2013   Dressing Status Clean, dry, & intact 02/04/21 2013   Dressing Type Non-adherent dressing 02/04/21 2013   Hub Color/Line Status Green 02/04/21 2013   Action Taken Blood drawn 02/04/21 2013        Opportunity for questions and clarification was provided.       Patient transported with:  Patient transport

## 2021-02-05 NOTE — ED PROVIDER NOTES
82-year-old male presenting for worsening shortness of breath, cough, fatigue some nausea and vomiting in the setting of COVID-19 infection. His only significant medical history is myasthenia gravis 6 or 7 years ago. He takes no medications now. The history is provided by the patient. Positive For Covid-19  Max temp prior to arrival:  101  Temp source:  Oral  Severity:  Moderate  Onset quality:  Gradual  Duration:  6 days  Timing:  Constant  Progression:  Worsening  Chronicity:  New  Relieved by:  Acetaminophen  Worsened by:  Exertion and movement  Ineffective treatments:  None tried  Associated symptoms: cough and headaches    Associated symptoms: no chest pain, no ear pain, no rash, no rhinorrhea, no sore throat and no vomiting    Risk factors: sick contacts    Shortness of Breath  This is a new problem. The current episode started more than 1 week ago. The problem has been gradually worsening. Associated symptoms include a fever, headaches, cough, sputum production and wheezing. Pertinent negatives include no coryza, no rhinorrhea, no sore throat, no swollen glands, no ear pain, no neck pain, no hemoptysis, no PND, no orthopnea, no chest pain, no syncope, no vomiting, no abdominal pain, no rash, no leg pain, no leg swelling and no claudication. It is unknown what precipitated the problem. He has tried nothing for the symptoms. The treatment provided no relief. He has had no prior hospitalizations. He has had no prior ED visits. He has had no prior ICU admissions. Associated medical issues do not include asthma, COPD, pneumonia, chronic lung disease, PE, CAD, heart failure, past MI, DVT or recent surgery. No past medical history on file. No past surgical history on file. No family history on file.     Social History     Socioeconomic History    Marital status: Not on file     Spouse name: Not on file    Number of children: Not on file    Years of education: Not on file    Highest education level: Not on file   Occupational History    Not on file   Social Needs    Financial resource strain: Not on file    Food insecurity     Worry: Not on file     Inability: Not on file    Transportation needs     Medical: Not on file     Non-medical: Not on file   Tobacco Use    Smoking status: Not on file   Substance and Sexual Activity    Alcohol use: Not on file    Drug use: Not on file    Sexual activity: Not on file   Lifestyle    Physical activity     Days per week: Not on file     Minutes per session: Not on file    Stress: Not on file   Relationships    Social connections     Talks on phone: Not on file     Gets together: Not on file     Attends [employer] service: Not on file     Active member of club or organization: Not on file     Attends meetings of clubs or organizations: Not on file     Relationship status: Not on file    Intimate partner violence     Fear of current or ex partner: Not on file     Emotionally abused: Not on file     Physically abused: Not on file     Forced sexual activity: Not on file   Other Topics Concern    Not on file   Social History Narrative    Not on file         ALLERGIES: Patient has no known allergies. Review of Systems   Constitutional: Positive for fever. HENT: Negative for ear pain, rhinorrhea and sore throat. Respiratory: Positive for cough, sputum production, shortness of breath and wheezing. Negative for hemoptysis. Cardiovascular: Negative for chest pain, orthopnea, claudication, leg swelling, syncope and PND. Gastrointestinal: Negative for abdominal pain and vomiting. Musculoskeletal: Negative for neck pain. Skin: Negative for rash. Neurological: Positive for headaches. All other systems reviewed and are negative.       Vitals:    02/04/21 1957   BP: 136/72   Pulse: (!) 117   Resp: 26   Temp: (!) 100.7 °F (38.2 °C)   SpO2: 91%   Weight: 72.6 kg (160 lb)   Height: 5' 10\" (1.778 m)            Physical Exam  Vitals signs and nursing note reviewed. Constitutional:       Appearance: He is well-developed. HENT:      Head: Normocephalic and atraumatic. Eyes:      Conjunctiva/sclera: Conjunctivae normal.      Pupils: Pupils are equal, round, and reactive to light. Neck:      Musculoskeletal: Normal range of motion and neck supple. Cardiovascular:      Rate and Rhythm: Regular rhythm. Tachycardia present. Heart sounds: Normal heart sounds. Pulmonary:      Effort: Pulmonary effort is normal. Tachypnea present. Breath sounds: Examination of the right-upper field reveals decreased breath sounds and rhonchi. Examination of the left-upper field reveals decreased breath sounds and rhonchi. Examination of the right-middle field reveals decreased breath sounds and rhonchi. Examination of the left-middle field reveals decreased breath sounds and rhonchi. Examination of the right-lower field reveals decreased breath sounds and rhonchi. Examination of the left-lower field reveals decreased breath sounds and rhonchi. Decreased breath sounds and rhonchi present. Abdominal:      General: Bowel sounds are normal.      Palpations: Abdomen is soft. Musculoskeletal: Normal range of motion. General: No deformity. Skin:     General: Skin is warm and dry. Neurological:      Mental Status: He is alert and oriented to person, place, and time. Cranial Nerves: No cranial nerve deficit. Psychiatric:         Behavior: Behavior normal.          MDM  Number of Diagnoses or Management Options  Acute respiratory failure with hypoxia (HCC)  Cavitary lesion of lung  Pneumonia due to COVID-19 virus  Diagnosis management comments: 61-year-old male presenting for worsening symptoms in the setting of COVID-19 infection. Oxygen saturations are 90% on room air resting.        Amount and/or Complexity of Data Reviewed  Clinical lab tests: ordered and reviewed (Results for orders placed or performed during the hospital encounter of 02/04/21  -CBC WITH AUTOMATED DIFF       Result                      Value             Ref Range           WBC                         10.8              4.3 - 11.1 K*       RBC                         5.89 (H)          4.23 - 5.6 M*       HGB                         14.1              13.6 - 17.2 *       HCT                         44.5              41.1 - 50.3 %       MCV                         75.6 (L)          79.6 - 97.8 *       MCH                         23.9 (L)          26.1 - 32.9 *       MCHC                        31.7              31.4 - 35.0 *       RDW                         13.4              11.9 - 14.6 %       PLATELET                    484 (H)           150 - 450 K/*       MPV                         9.2 (L)           9.4 - 12.3 FL       ABSOLUTE NRBC               0.00              0.0 - 0.2 K/*       DF                          AUTOMATED                             NEUTROPHILS                 79 (H)            43 - 78 %           LYMPHOCYTES                 7 (L)             13 - 44 %           MONOCYTES                   7                 4.0 - 12.0 %        EOSINOPHILS                 0 (L)             0.5 - 7.8 %         BASOPHILS                   1                 0.0 - 2.0 %         IMMATURE GRANULOCYTES       5                 0.0 - 5.0 %         ABS. NEUTROPHILS            8.6 (H)           1.7 - 8.2 K/*       ABS. LYMPHOCYTES            0.8               0.5 - 4.6 K/*       ABS. MONOCYTES              0.8               0.1 - 1.3 K/*       ABS. EOSINOPHILS            0.0               0.0 - 0.8 K/*       ABS. BASOPHILS              0.1               0.0 - 0.2 K/*       ABS. IMM.  GRANS.            0.5               0.0 - 0.5 K/*  -METABOLIC PANEL, COMPREHENSIVE       Result                      Value             Ref Range           Sodium                      128 (L)           138 - 145 mm*       Potassium                   3.9               3.5 - 5.1 mm*       Chloride                    94 (L) 98 - 107 mmo*       CO2                         27                21 - 32 mmol*       Anion gap                   7                 7 - 16 mmol/L       Glucose                     257 (H)           65 - 100 mg/*       BUN                         12                6 - 23 MG/DL        Creatinine                  0.93              0.8 - 1.5 MG*       GFR est AA                  >60               >60 ml/min/1*       GFR est non-AA              >60               >60 ml/min/1*       Calcium                     9.8               8.3 - 10.4 M*       Bilirubin, total            0.6               0.2 - 1.1 MG*       ALT (SGPT)                  41                12 - 65 U/L         AST (SGOT)                  15                15 - 37 U/L         Alk.  phosphatase            116               50 - 136 U/L        Protein, total              9.1 (H)           6.3 - 8.2 g/*       Albumin                     3.0 (L)           3.5 - 5.0 g/*       Globulin                    6.1 (H)           2.3 - 3.5 g/*       A-G Ratio                   0.5 (L)           1.2 - 3.5      -MAGNESIUM       Result                      Value             Ref Range           Magnesium                   2.2               1.8 - 2.4 mg*  -PROCALCITONIN       Result                      Value             Ref Range           Procalcitonin               0.12              ng/mL          -C REACTIVE PROTEIN, QT       Result                      Value             Ref Range           C-Reactive protein          13.1 (H)          0.0 - 0.9 mg*  -D DIMER       Result                      Value             Ref Range           D DIMER                     0.42              <0.56 ug/ml(*  -LACTIC ACID       Result                      Value             Ref Range           Lactic acid                 2.0               0.4 - 2.0 MM*  -FERRITIN       Result                      Value             Ref Range           Ferritin                    723 (H)           8 - 388 NG/ML  -FIBRINOGEN       Result                      Value             Ref Range           Fibrinogen                  859 (H)           190 - 501 mg*  -LD       Result                      Value             Ref Range           LD                          249 (H)           100 - 190 U/L  -TYPE & SCREEN       Result                      Value             Ref Range           Crossmatch Expiration                                         02/07/2021,2359       ABO/Rh(D)                   B POSITIVE                            Antibody screen             NEG                              )  Tests in the radiology section of [facility]®: ordered and reviewed (Ct Chest W Cont    Result Date: 2/4/2021  CT OF THE CHEST WITH CONTRAST HISTORY: Bilateral cavitary lung lesions COMPARISON: Chest x-ray dated 2/4/9476 TECHNIQUE: A helical acquisition was performed through the chest utilizing 5.0ZJ slice thickness during the intravenous infusion of 100 cc of Isovue-370. Coronal and sagittal reformats were obtained. Dose reduction techniques used: Automated exposure control, adjustment of the mAs and/or kVp according to patient's size, and iterative reconstruction techniques. FINDINGS: *  PLEURA / PERICARDIUM: Within normal limits. *  LUNGS: Bilateral upper lobe cavitary lung lesions. One in particular in the left upper lobe on image number 31 appears to represent a cavitating nodule measuring 1.4 x 1.3 cm. I lateral groundglass opacities primarily in the lower lobes. *  LATONIA / MEDIASTINUM: Within normal limits. *  TRACHEOBRONCHIAL TREE: Within normal limits. *  AORTA/PULMONARY VESSELS: Within normal limits. *  HEART: Within normal limits. *  CORONARY ARTERIES: No coronary artery calcification is seen. *  CHEST WALL/AXILLA: Within normal limits. *  VISUALIZED UPPER ABDOMEN: Within normal limits. *  SPINE / BONES: Within normal limits. *  ADDITIONAL COMMENTS: None. Bilateral cavitating upper lobe lung nodules.  The differential diagnosis is broad but these could represent septic emboli, necrotizing pneumonia including tuberculosis. Based on their appearance I would place neoplasm less likely. Also consider polyangiitis such as Wegener's granulomatosis. Findings this with bilateral basilar Covid pneumonia. Date of Dictation: 2/4/2021 10:54 PM     Xr Chest Port    Result Date: 2/4/2021  EXAM: XR CHEST PORT INDICATION: shob COMPARISON: None. FINDINGS: A portable AP radiograph of the chest was obtained at 2038 hours. The patient is on a cardiac monitor. Bilateral airspace disease with evidence of cavitation in both upper lobes. . The cardiac and mediastinal contours and pulmonary vascularity are normal.  The bones and soft tissues are grossly within normal limits. Bilateral pneumonia with evidence of cavitation in both upper lobes.    )  Tests in the medicine section of [facility]®: ordered and reviewed  Discuss the patient with other providers: yes (Discussed with hospitalist will assume care)  Independent visualization of images, tracings, or specimens: yes (Reviewed all imaging and EKG)    Risk of Complications, Morbidity, and/or Mortality  Presenting problems: high  Diagnostic procedures: high  Management options: high  General comments: I personally reviewed the patient's vital signs, laboratory tests, and/or radiological findings. I discussed these findings with the patient and their significance. I answered all questions and explained that given these findings there is significant concern for increased morbidity and/or mortality without immediate intervention. As a result, I recommended admission to the hospital, consulted the appropriate service, and transitioned care to that service in improved condition      Patient Progress  Patient progress: stable    ED Course as of Feb 04 2309   Thu Feb 04, 2021 2137 Patient has cavitary lesions on chest x-ray.   He mentioned to me during my evaluation that he has been tested for tuberculosis and it came back negative.   Still ordering a CT of the chest with contrast.    [JS]      ED Course User Index  [JS] Beverly Duran MD       EKG    Date/Time: 2/4/2021 8:40 PM  Performed by: Beverly Duran MD  Authorized by: Beverly Duran MD     ECG reviewed by ED Physician in the absence of a cardiologist: yes    Previous ECG:     Previous ECG:  Unavailable  Interpretation:     Interpretation: abnormal    Rate:     ECG rate:  115    ECG rate assessment: tachycardic    Rhythm:     Rhythm: sinus rhythm    Ectopy:     Ectopy: none    QRS:     QRS axis:  Normal  Conduction:     Conduction: normal    ST segments:     ST segments:  Normal  T waves:     T waves: normal

## 2021-02-05 NOTE — ED TRIAGE NOTES
Pt coming in from home by [facility] for SOB. Pt tested positive for covid on Friday. Pt has a cough with white mucus. States today he became SOB and lost his appetite and has loose stools. . Denies any medical hx. EMS found patient tachypneic at 40 and sats at 75. EMS placed him on 4 liters with nonrebreather. /83, , RR28 at 100% at 15 liters nonrebreather.

## 2021-02-05 NOTE — PROGRESS NOTES
TRANSFER - IN REPORT:    Verbal report received from PABLO Theodore on Mundo Jay  being received from [facility] ER for change in patient condition(Covid +, possible TB)      Report consisted of patients Situation, Background, Assessment and   Recommendations(SBAR). Information from the following report(s) SBAR was reviewed with the receiving nurse. Opportunity for questions and clarification was provided. Assessment completed upon patients arrival to unit and care assumed.

## 2021-02-05 NOTE — PROGRESS NOTES
Hospitalist Progress Note    2021  Admit Date: 2021  7:48 PM   NAME: Mitali Del Cid   :  1960   MRN:  [id number]   Attending: Duc Phillips DO  PCP:  Johana, MD Carlos    SUBJECTIVE:   Patient is a 61-year-old [place] [facility] American with PMHx of myasthenia gravis who recently immigrated from [facility] 2.5 years ago presented to the ED for worsening shortness of breath associated with cough, fatigue, nausea and vomiting for 1 week. Patient stated he was tested positive for Covid on  after 10 days of URI symptoms. He went to an urgent care on  and was prescribed Flonase and Medrol Dosepak without alleviation. He was also recently treated with a 7-day course of clindamycin for right-sided dental infection. EMS was called and found him with his O2 sat 75% and he was placed on NRB mask. In ED, CT chest shows bilateral cavitary lesions and bilateral basilar groundglass opacifications consistent with COVID-19 viral pneumonia; negative for PE. Patient stated that he had a bronchoscopy that ruled out TB prior to his treatment w/ immunosuppressants for myasthenia gravis from [id number]. He has not seen a physician since he has been in the [address] [place] [address] and reported he has received all vaccinations prior to moving here. In ED, Tmax 100.7. , O2sat 91% on RA. Na 128, D-Dimer neg, LA 2.0, CRP 13, ferritin 723. He was given Decadron and Unasyn. This a.m., patient was breathing comfortably on 3 L nasal cannula. He denies fever, chills, shortness of breath, chest pain, abdominal pain, nausea, vomiting, diarrhea, constipation.     Review of Systems negative with exception of pertinent positives noted above  PHYSICAL EXAM     Visit Vitals  /74   Pulse 82   Temp 97.4 °F (36.3 °C)   Resp 20   Ht 5' 10\" (1.778 m)   Wt 72.6 kg (160 lb)   SpO2 91%   BMI 22.96 kg/m²      Temp (24hrs), Av.5 °F (36.9 °C), Min:97.4 °F (36.3 °C), Max:100.7 °F (38.2 °C)    Oxygen Therapy  O2 Sat (%): 91 % (21 0731)  Pulse via Oximetry: 89 beats per minute (02/05/21 0530)  O2 Device: CO2 nasal cannula (02/05/21 0730)  O2 Flow Rate (L/min): 3 l/min (02/05/21 0730)    Intake/Output Summary (Last 24 hours) at 2/5/2021 1608  Last data filed at 2/5/2021 1000  Gross per 24 hour   Intake 240 ml   Output 1600 ml   Net -1360 ml      General: No acute distress. Lungs:  Coarse lung sounds Bilaterally. No conversational dyspnea  Heart:  Regular rate and rhythm,  No murmur, rub, or gallop  Abdomen: Soft, Non distended, Non tender, Positive bowel sounds  Extremities: No cyanosis, clubbing or edema  Neurologic:  No focal deficits    CT CHEST W CONT   Final Result      Bilateral cavitating upper lobe lung nodules. The differential diagnosis is   broad but these could represent septic emboli, necrotizing pneumonia including   tuberculosis. Based on their appearance I would place neoplasm less likely. Also   consider polyangiitis such as Wegener's granulomatosis. Findings this with bilateral basilar Covid pneumonia. Date of Dictation: 2/4/2021 10:54 PM         XR CHEST PORT   Final Result   Bilateral pneumonia with evidence of cavitation in both upper lobes. ASSESSMENT      Active Hospital Problems    Diagnosis Date Noted    Acute hypoxemic respiratory failure ([facility] [address] 02/05/2021    2019 novel coronavirus disease (COVID-19) 02/05/2021    Cavitary lung disease 02/05/2021    Sepsis ([facility] [address] 02/05/2021     Plan:    Acute hypoxemic respiratory failure 2/2 COVID-19 viral pneumonia  Cavitary lung disease  Sepsis  URI symptoms >10 days w/ COVID POSITVE 1/28/21. Meets sepsis criteria with Tmax >100.4, HR>100. Most likely due to pulmonary source. CT chest shows Bilateral cavitating upper lobe lung nodules w/ DDx septic emboli, polyangiitis such as Wegener's granulomatosis, necrotizing pneumonia including  Tuberculosis; neoplasm less likely. Procal 0.07. D-Dimer negative  Abx: Unasyn (2/5-. ..) for anaerobic coverage for possible aspiration PNA  Decadron 6mg every day for 10 days  Outside therapeutic window for Remdesivir   Zinc/vitamin C daily  Added IS and albuterol prn  Prone as tolerated  Lantus and SSI while on steroids  Oxygen supplementation, on 3L. Wean O2 as tolerated  Pulmonary consulted, appreciate recs  Check AFB x3 BID with induced sputum, if unable to produce sputum then bronch on Monday  F/u ANNABELLA, ANCA, histoplasma Ag, Anti-GBM Gold Quantiferon BCx, Fungal and respiratory Cx        DVT Prophylaxis: Lovenox SQ  Dispo: Pending improvement.      Signed By: Lara Quinteros DO     February 5, 2021

## 2021-02-05 NOTE — ED NOTES
Spoke to Shahana Mendoza in lab about coming to draw lab work on patient that was due at 2, stated she is contacting phleb now.

## 2021-02-05 NOTE — ED NOTES
Contacted marvin in lab about coming to get patient repeat blood culture and new labs ordered on patient.  States she will come to get labs

## 2021-02-06 LAB
ALBUMIN SERPL-MCNC: 2.4 G/DL (ref 3.5–5)
ALBUMIN/GLOB SERPL: 0.5 {RATIO} (ref 1.2–3.5)
ALP SERPL-CCNC: 84 U/L (ref 50–136)
ALT SERPL-CCNC: 22 U/L (ref 12–65)
ANION GAP SERPL CALC-SCNC: 6 MMOL/L (ref 7–16)
AST SERPL-CCNC: 8 U/L (ref 15–37)
BASOPHILS # BLD: 0.1 K/UL (ref 0–0.2)
BASOPHILS NFR BLD: 1 % (ref 0–2)
BILIRUB SERPL-MCNC: 0.5 MG/DL (ref 0.2–1.1)
BUN SERPL-MCNC: 17 MG/DL (ref 6–23)
CALCIUM SERPL-MCNC: 9.4 MG/DL (ref 8.3–10.4)
CHLORIDE SERPL-SCNC: 103 MMOL/L (ref 98–107)
CO2 SERPL-SCNC: 28 MMOL/L (ref 21–32)
CREAT SERPL-MCNC: 0.79 MG/DL (ref 0.8–1.5)
CRP SERPL-MCNC: 7.2 MG/DL (ref 0–0.9)
D DIMER PPP FEU-MCNC: 0.4 UG/ML(FEU)
DIFFERENTIAL METHOD BLD: ABNORMAL
EOSINOPHIL # BLD: 0.1 K/UL (ref 0–0.8)
EOSINOPHIL NFR BLD: 1 % (ref 0.5–7.8)
ERYTHROCYTE [DISTWIDTH] IN BLOOD BY AUTOMATED COUNT: 13.3 % (ref 11.9–14.6)
GLOBULIN SER CALC-MCNC: 4.7 G/DL (ref 2.3–3.5)
GLUCOSE BLD STRIP.AUTO-MCNC: 182 MG/DL (ref 65–100)
GLUCOSE BLD STRIP.AUTO-MCNC: 285 MG/DL (ref 65–100)
GLUCOSE BLD STRIP.AUTO-MCNC: 302 MG/DL (ref 65–100)
GLUCOSE BLD STRIP.AUTO-MCNC: 319 MG/DL (ref 65–100)
GLUCOSE BLD STRIP.AUTO-MCNC: 379 MG/DL (ref 65–100)
GLUCOSE SERPL-MCNC: 187 MG/DL (ref 65–100)
HCT VFR BLD AUTO: 37.6 % (ref 41.1–50.3)
HGB BLD-MCNC: 12.3 G/DL (ref 13.6–17.2)
IMM GRANULOCYTES # BLD AUTO: 0.7 K/UL (ref 0–0.5)
IMM GRANULOCYTES NFR BLD AUTO: 5 % (ref 0–5)
LYMPHOCYTES # BLD: 1.2 K/UL (ref 0.5–4.6)
LYMPHOCYTES NFR BLD: 9 % (ref 13–44)
MAGNESIUM SERPL-MCNC: 2.2 MG/DL (ref 1.8–2.4)
MCH RBC QN AUTO: 24.6 PG (ref 26.1–32.9)
MCHC RBC AUTO-ENTMCNC: 32.7 G/DL (ref 31.4–35)
MCV RBC AUTO: 75.4 FL (ref 79.6–97.8)
MONOCYTES # BLD: 1 K/UL (ref 0.1–1.3)
MONOCYTES NFR BLD: 7 % (ref 4–12)
NEUTS SEG # BLD: 10.5 K/UL (ref 1.7–8.2)
NEUTS SEG NFR BLD: 77 % (ref 43–78)
NRBC # BLD: 0 K/UL (ref 0–0.2)
PLATELET # BLD AUTO: 447 K/UL (ref 150–450)
PMV BLD AUTO: 9.2 FL (ref 9.4–12.3)
POTASSIUM SERPL-SCNC: 4 MMOL/L (ref 3.5–5.1)
PROT SERPL-MCNC: 7.1 G/DL (ref 6.3–8.2)
RBC # BLD AUTO: 4.99 M/UL (ref 4.23–5.6)
SODIUM SERPL-SCNC: 137 MMOL/L (ref 136–145)
WBC # BLD AUTO: 13.6 K/UL (ref 4.3–11.1)

## 2021-02-06 PROCEDURE — 83516 IMMUNOASSAY NONANTIBODY: CPT

## 2021-02-06 PROCEDURE — 74011000258 HC RX REV CODE- 258: Performed by: HOSPITALIST

## 2021-02-06 PROCEDURE — 74011250637 HC RX REV CODE- 250/637: Performed by: HOSPITALIST

## 2021-02-06 PROCEDURE — 82962 GLUCOSE BLOOD TEST: CPT

## 2021-02-06 PROCEDURE — 87070 CULTURE OTHR SPECIMN AEROBIC: CPT

## 2021-02-06 PROCEDURE — 86038 ANTINUCLEAR ANTIBODIES: CPT

## 2021-02-06 PROCEDURE — 74011636637 HC RX REV CODE- 636/637: Performed by: HOSPITALIST

## 2021-02-06 PROCEDURE — 80053 COMPREHEN METABOLIC PANEL: CPT

## 2021-02-06 PROCEDURE — 2709999900 HC NON-CHARGEABLE SUPPLY

## 2021-02-06 PROCEDURE — 65270000029 HC RM PRIVATE

## 2021-02-06 PROCEDURE — 85025 COMPLETE CBC W/AUTO DIFF WBC: CPT

## 2021-02-06 PROCEDURE — 83735 ASSAY OF MAGNESIUM: CPT

## 2021-02-06 PROCEDURE — 74011000250 HC RX REV CODE- 250: Performed by: INTERNAL MEDICINE

## 2021-02-06 PROCEDURE — 36415 COLL VENOUS BLD VENIPUNCTURE: CPT

## 2021-02-06 PROCEDURE — 86256 FLUORESCENT ANTIBODY TITER: CPT

## 2021-02-06 PROCEDURE — 87077 CULTURE AEROBIC IDENTIFY: CPT

## 2021-02-06 PROCEDURE — 87186 SC STD MICRODIL/AGAR DIL: CPT

## 2021-02-06 PROCEDURE — 86140 C-REACTIVE PROTEIN: CPT

## 2021-02-06 PROCEDURE — 85379 FIBRIN DEGRADATION QUANT: CPT

## 2021-02-06 PROCEDURE — 74011250636 HC RX REV CODE- 250/636: Performed by: HOSPITALIST

## 2021-02-06 RX ADMIN — INSULIN LISPRO 8 UNITS: 100 INJECTION, SOLUTION INTRAVENOUS; SUBCUTANEOUS at 23:23

## 2021-02-06 RX ADMIN — Medication 10 ML: at 17:10

## 2021-02-06 RX ADMIN — AMPICILLIN SODIUM AND SULBACTAM SODIUM 3 G: 2; 1 INJECTION, POWDER, FOR SOLUTION INTRAMUSCULAR; INTRAVENOUS at 12:47

## 2021-02-06 RX ADMIN — FAMOTIDINE 40 MG: 20 TABLET, FILM COATED ORAL at 09:30

## 2021-02-06 RX ADMIN — AMPICILLIN SODIUM AND SULBACTAM SODIUM 3 G: 2; 1 INJECTION, POWDER, FOR SOLUTION INTRAMUSCULAR; INTRAVENOUS at 22:41

## 2021-02-06 RX ADMIN — AMPICILLIN SODIUM AND SULBACTAM SODIUM 3 G: 2; 1 INJECTION, POWDER, FOR SOLUTION INTRAMUSCULAR; INTRAVENOUS at 05:05

## 2021-02-06 RX ADMIN — Medication 10 ML: at 06:00

## 2021-02-06 RX ADMIN — VITAMIN D, TAB 1000IU (100/BT) 1000 UNITS: 25 TAB at 09:30

## 2021-02-06 RX ADMIN — OXYCODONE HYDROCHLORIDE AND ACETAMINOPHEN 500 MG: 500 TABLET ORAL at 09:31

## 2021-02-06 RX ADMIN — GUAIFENESIN 600 MG: 600 TABLET ORAL at 05:05

## 2021-02-06 RX ADMIN — Medication 10 ML: at 22:44

## 2021-02-06 RX ADMIN — ZINC SULFATE 220 MG (50 MG) CAPSULE 1 CAPSULE: CAPSULE at 09:31

## 2021-02-06 RX ADMIN — INSULIN GLARGINE 15 UNITS: 100 INJECTION, SOLUTION SUBCUTANEOUS at 09:38

## 2021-02-06 RX ADMIN — ZOLPIDEM TARTRATE 5 MG: 5 TABLET ORAL at 22:43

## 2021-02-06 RX ADMIN — ENOXAPARIN SODIUM 40 MG: 40 INJECTION SUBCUTANEOUS at 09:32

## 2021-02-06 RX ADMIN — INSULIN LISPRO 8 UNITS: 100 INJECTION, SOLUTION INTRAVENOUS; SUBCUTANEOUS at 12:42

## 2021-02-06 RX ADMIN — INSULIN LISPRO 2 UNITS: 100 INJECTION, SOLUTION INTRAVENOUS; SUBCUTANEOUS at 06:00

## 2021-02-06 RX ADMIN — BENZONATATE 100 MG: 100 CAPSULE ORAL at 22:43

## 2021-02-06 RX ADMIN — AMPICILLIN SODIUM AND SULBACTAM SODIUM 3 G: 2; 1 INJECTION, POWDER, FOR SOLUTION INTRAMUSCULAR; INTRAVENOUS at 17:01

## 2021-02-06 RX ADMIN — INSULIN LISPRO 6 UNITS: 100 INJECTION, SOLUTION INTRAVENOUS; SUBCUTANEOUS at 17:02

## 2021-02-06 RX ADMIN — DEXAMETHASONE 6 MG: 4 TABLET ORAL at 09:31

## 2021-02-06 RX ADMIN — GUAIFENESIN 600 MG: 600 TABLET ORAL at 17:09

## 2021-02-06 RX ADMIN — LACTOBACILLUS TAB 2 TABLET: TAB at 09:31

## 2021-02-06 NOTE — PROGRESS NOTES
Bedside report received from night nurse Joseline. Assessment done as noted  Respiration even and unlabored 20/min; denies pain or nausea at present. afebrile this morning. Productive coughing at interval with clear phlegm reported per patient. O2 intact with 4 liters via nasal canula with O2 sats 95% at rest. D-sats easily into 80s with exertion. Remains on Droplet plus and airborne isolation to r/o COVID-19 screening and TB. Ordered PPE in place and in use. Encouraged to call with needs.

## 2021-02-06 NOTE — PROGRESS NOTES
Late entry: BSR received  Patient resting in bed quietly  Respirations even on 3LNC  Patient denies needs/pain  There are no signs of distress at this time

## 2021-02-06 NOTE — PROGRESS NOTES
Hospitalist Progress Note    2021  Admit Date: 2021  7:48 PM   NAME: Shawna Guerrero   :  1960   MRN:  [id number]   Attending: Sebastián Bell MD  PCP:  Johana, MD Carlos    SUBJECTIVE:   Patient is a 77-year-old [facility] Steven Schmitt with PMHx of myasthenia gravis who recently immigrated from [facility] 2.5 years ago presented to the ED for worsening shortness of breath associated with cough, fatigue, nausea and vomiting for 1 week. Patient stated he was tested positive for Covid on  after 10 days of URI symptoms. He went to an urgent care on  and was prescribed Flonase and Medrol Dosepak without alleviation. He was also recently treated with a 7-day course of clindamycin for right-sided dental infection. EMS was called and found him with his O2 sat 75% and he was placed on NRB mask. In ED, CT chest shows bilateral cavitary lesions and bilateral basilar groundglass opacifications consistent with COVID-19 viral pneumonia; negative for PE. Patient stated that he had a bronchoscopy that ruled out TB prior to his treatment w/ immunosuppressants for myasthenia gravis from 0539-5855. He has not seen a physician since he has been in the [address] [place] [address],[address] and reported he has received all vaccinations prior to moving here. In ED, Tmax 100.7. , O2sat 91% on RA. Na 128, D-Dimer neg, LA 2.0, CRP 13, ferritin 723. He was given Decadron and Unasyn. : remains on 4L NC  Some dyspnea, mild cough, productive.  No fevers    Review of Systems negative with exception of pertinent positives noted above  PHYSICAL EXAM     Visit Vitals  /77 (BP 1 Location: Left upper arm, BP Patient Position: At rest)   Pulse 64   Temp 98.8 °F (37.1 °C)   Resp 18   Ht 5' 10\" (1.778 m)   Wt 72.6 kg (160 lb)   SpO2 96%   BMI 22.96 kg/m²      Temp (24hrs), Av.1 °F (36.7 °C), Min:97.4 °F (36.3 °C), Max:98.8 °F (37.1 °C)    Oxygen Therapy  O2 Sat (%): 96 % (21 1100)  Pulse via Oximetry: 74 beats per minute (21 0727)  O2 Device: Nasal cannula (02/06/21 0727)  O2 Flow Rate (L/min): 3 l/min (02/06/21 0727)    Intake/Output Summary (Last 24 hours) at 2/6/2021 1345  Last data filed at 2/5/2021 1815  Gross per 24 hour   Intake 240 ml   Output    Net 240 ml      General: No acute distress. Lungs:  Coarse lung sounds Bilaterally. No conversational dyspnea  Heart:  Regular rate and rhythm,  No murmur, rub, or gallop  Abdomen: Soft, Non distended, Non tender, Positive bowel sounds  Extremities: No cyanosis, clubbing or edema  Neurologic:  No focal deficits    CT CHEST W CONT   Final Result      Bilateral cavitating upper lobe lung nodules. The differential diagnosis is   broad but these could represent septic emboli, necrotizing pneumonia including   tuberculosis. Based on their appearance I would place neoplasm less likely. Also   consider polyangiitis such as Wegener's granulomatosis. Findings this with bilateral basilar Covid pneumonia. Date of Dictation: 2/4/2021 10:54 PM         XR CHEST PORT   Final Result   Bilateral pneumonia with evidence of cavitation in both upper lobes. ASSESSMENT      Active Hospital Problems    Diagnosis Date Noted    Acute hypoxemic respiratory failure ([facility] 02/05/2021    2019 novel coronavirus disease (COVID-19) 02/05/2021    Cavitary lung disease 02/05/2021    Sepsis ([facility] 02/05/2021     Plan:    Acute hypoxemic respiratory failure 2/2 COVID-19 viral pneumonia  Cavitary lung disease  Sepsis  URI symptoms >10 days w/ COVID POSITVE 1/28/21. Meets sepsis criteria with Tmax >100.4, HR>100. Most likely due to pulmonary source. CT chest shows Bilateral cavitating upper lobe lung nodules w/ DDx septic emboli, polyangiitis such as Wegener's granulomatosis, necrotizing pneumonia including  Tuberculosis; neoplasm less likely. Procal 0.07. D-Dimer negative  Abx: Unasyn (2/5-. ..) for anaerobic coverage for possible aspiration PNA  Decadron 6mg every day for 10 days  Outside therapeutic window for Remdesivir   Zinc/vitamin C daily  Lantus and SSI while on steroids  Oxygen supplementation, on 3L. Wean O2 as tolerated  Pulmonary consulted, appreciate recs  Check AFB x3 with induced sputum, if unable to produce sputum pulm plans possible bronch on Monday  F/u ANNABELLA, ANCA, histoplasma Ag, Anti-GBM Gold Quantiferon BCx, Fungal and respiratory Cx - still pending         DVT Prophylaxis: Lovenox SQ  Dispo: Pending improvement.      Signed By: Quentin Zavala MD     February 6, 2021

## 2021-02-07 LAB
ALBUMIN SERPL-MCNC: 2.3 G/DL (ref 3.5–5)
ALBUMIN/GLOB SERPL: 0.5 {RATIO} (ref 1.2–3.5)
ALP SERPL-CCNC: 86 U/L (ref 50–136)
ALT SERPL-CCNC: 21 U/L (ref 12–65)
ANION GAP SERPL CALC-SCNC: 6 MMOL/L (ref 7–16)
AST SERPL-CCNC: 8 U/L (ref 15–37)
BASOPHILS # BLD: 0 K/UL (ref 0–0.2)
BASOPHILS NFR BLD: 0 % (ref 0–2)
BILIRUB SERPL-MCNC: 0.3 MG/DL (ref 0.2–1.1)
BUN SERPL-MCNC: 15 MG/DL (ref 6–23)
CALCIUM SERPL-MCNC: 9.2 MG/DL (ref 8.3–10.4)
CHLORIDE SERPL-SCNC: 102 MMOL/L (ref 98–107)
CO2 SERPL-SCNC: 27 MMOL/L (ref 21–32)
CREAT SERPL-MCNC: 0.7 MG/DL (ref 0.8–1.5)
DIFFERENTIAL METHOD BLD: ABNORMAL
EOSINOPHIL # BLD: 0 K/UL (ref 0–0.8)
EOSINOPHIL NFR BLD: 0 % (ref 0.5–7.8)
ERYTHROCYTE [DISTWIDTH] IN BLOOD BY AUTOMATED COUNT: 13.2 % (ref 11.9–14.6)
GLOBULIN SER CALC-MCNC: 4.2 G/DL (ref 2.3–3.5)
GLUCOSE BLD STRIP.AUTO-MCNC: 239 MG/DL (ref 65–100)
GLUCOSE BLD STRIP.AUTO-MCNC: 340 MG/DL (ref 65–100)
GLUCOSE BLD STRIP.AUTO-MCNC: 344 MG/DL (ref 65–100)
GLUCOSE BLD STRIP.AUTO-MCNC: 377 MG/DL (ref 65–100)
GLUCOSE SERPL-MCNC: 228 MG/DL (ref 65–100)
HCT VFR BLD AUTO: 35.8 % (ref 41.1–50.3)
HGB BLD-MCNC: 11.4 G/DL (ref 13.6–17.2)
IMM GRANULOCYTES # BLD AUTO: 0.5 K/UL (ref 0–0.5)
IMM GRANULOCYTES NFR BLD AUTO: 4 % (ref 0–5)
LYMPHOCYTES # BLD: 0.9 K/UL (ref 0.5–4.6)
LYMPHOCYTES NFR BLD: 8 % (ref 13–44)
MCH RBC QN AUTO: 24.1 PG (ref 26.1–32.9)
MCHC RBC AUTO-ENTMCNC: 31.8 G/DL (ref 31.4–35)
MCV RBC AUTO: 75.5 FL (ref 79.6–97.8)
MONOCYTES # BLD: 0.8 K/UL (ref 0.1–1.3)
MONOCYTES NFR BLD: 7 % (ref 4–12)
NEUTS SEG # BLD: 9.3 K/UL (ref 1.7–8.2)
NEUTS SEG NFR BLD: 80 % (ref 43–78)
NRBC # BLD: 0 K/UL (ref 0–0.2)
PLATELET # BLD AUTO: 433 K/UL (ref 150–450)
PMV BLD AUTO: 9.3 FL (ref 9.4–12.3)
POTASSIUM SERPL-SCNC: 4.1 MMOL/L (ref 3.5–5.1)
PROT SERPL-MCNC: 6.5 G/DL (ref 6.3–8.2)
RBC # BLD AUTO: 4.74 M/UL (ref 4.23–5.6)
SODIUM SERPL-SCNC: 135 MMOL/L (ref 138–145)
WBC # BLD AUTO: 11.6 K/UL (ref 4.3–11.1)

## 2021-02-07 PROCEDURE — 74011000258 HC RX REV CODE- 258: Performed by: HOSPITALIST

## 2021-02-07 PROCEDURE — 80053 COMPREHEN METABOLIC PANEL: CPT

## 2021-02-07 PROCEDURE — 74011250637 HC RX REV CODE- 250/637: Performed by: HOSPITALIST

## 2021-02-07 PROCEDURE — 74011636637 HC RX REV CODE- 636/637: Performed by: HOSPITALIST

## 2021-02-07 PROCEDURE — 82962 GLUCOSE BLOOD TEST: CPT

## 2021-02-07 PROCEDURE — 74011636637 HC RX REV CODE- 636/637: Performed by: INTERNAL MEDICINE

## 2021-02-07 PROCEDURE — 85025 COMPLETE CBC W/AUTO DIFF WBC: CPT

## 2021-02-07 PROCEDURE — 65270000029 HC RM PRIVATE

## 2021-02-07 PROCEDURE — 74011250636 HC RX REV CODE- 250/636: Performed by: HOSPITALIST

## 2021-02-07 PROCEDURE — 2709999900 HC NON-CHARGEABLE SUPPLY

## 2021-02-07 RX ORDER — INSULIN LISPRO 100 [IU]/ML
4 INJECTION, SOLUTION INTRAVENOUS; SUBCUTANEOUS ONCE
Status: DISCONTINUED | OUTPATIENT
Start: 2021-02-07 | End: 2021-02-07

## 2021-02-07 RX ORDER — INSULIN LISPRO 100 [IU]/ML
4 INJECTION, SOLUTION INTRAVENOUS; SUBCUTANEOUS ONCE
Status: COMPLETED | OUTPATIENT
Start: 2021-02-07 | End: 2021-02-07

## 2021-02-07 RX ADMIN — ZINC SULFATE 220 MG (50 MG) CAPSULE 1 CAPSULE: CAPSULE at 09:00

## 2021-02-07 RX ADMIN — GUAIFENESIN 600 MG: 600 TABLET ORAL at 18:00

## 2021-02-07 RX ADMIN — LACTOBACILLUS TAB 2 TABLET: TAB at 09:10

## 2021-02-07 RX ADMIN — AMPICILLIN SODIUM AND SULBACTAM SODIUM 3 G: 2; 1 INJECTION, POWDER, FOR SOLUTION INTRAMUSCULAR; INTRAVENOUS at 04:51

## 2021-02-07 RX ADMIN — INSULIN LISPRO 4 UNITS: 100 INJECTION, SOLUTION INTRAVENOUS; SUBCUTANEOUS at 07:59

## 2021-02-07 RX ADMIN — INSULIN LISPRO 8 UNITS: 100 INJECTION, SOLUTION INTRAVENOUS; SUBCUTANEOUS at 12:17

## 2021-02-07 RX ADMIN — INSULIN LISPRO 10 UNITS: 100 INJECTION, SOLUTION INTRAVENOUS; SUBCUTANEOUS at 22:00

## 2021-02-07 RX ADMIN — INSULIN GLARGINE 15 UNITS: 100 INJECTION, SOLUTION SUBCUTANEOUS at 09:11

## 2021-02-07 RX ADMIN — DEXAMETHASONE 6 MG: 4 TABLET ORAL at 09:10

## 2021-02-07 RX ADMIN — VITAMIN D, TAB 1000IU (100/BT) 1000 UNITS: 25 TAB at 09:10

## 2021-02-07 RX ADMIN — AMPICILLIN SODIUM AND SULBACTAM SODIUM 3 G: 2; 1 INJECTION, POWDER, FOR SOLUTION INTRAMUSCULAR; INTRAVENOUS at 18:35

## 2021-02-07 RX ADMIN — Medication 10 ML: at 06:47

## 2021-02-07 RX ADMIN — ENOXAPARIN SODIUM 40 MG: 40 INJECTION SUBCUTANEOUS at 09:10

## 2021-02-07 RX ADMIN — FAMOTIDINE 40 MG: 20 TABLET, FILM COATED ORAL at 09:10

## 2021-02-07 RX ADMIN — AMPICILLIN SODIUM AND SULBACTAM SODIUM 3 G: 2; 1 INJECTION, POWDER, FOR SOLUTION INTRAMUSCULAR; INTRAVENOUS at 12:17

## 2021-02-07 RX ADMIN — INSULIN LISPRO 8 UNITS: 100 INJECTION, SOLUTION INTRAVENOUS; SUBCUTANEOUS at 18:36

## 2021-02-07 RX ADMIN — Medication 10 ML: at 14:00

## 2021-02-07 RX ADMIN — OXYCODONE HYDROCHLORIDE AND ACETAMINOPHEN 500 MG: 500 TABLET ORAL at 09:10

## 2021-02-07 RX ADMIN — Medication 10 ML: at 22:00

## 2021-02-07 RX ADMIN — GUAIFENESIN 600 MG: 600 TABLET ORAL at 06:53

## 2021-02-07 RX ADMIN — AMPICILLIN SODIUM AND SULBACTAM SODIUM 3 G: 2; 1 INJECTION, POWDER, FOR SOLUTION INTRAMUSCULAR; INTRAVENOUS at 22:50

## 2021-02-07 RX ADMIN — INSULIN LISPRO 4 UNITS: 100 INJECTION, SOLUTION INTRAVENOUS; SUBCUTANEOUS at 23:00

## 2021-02-07 NOTE — PROGRESS NOTES
Bedside report received from night nurse Adam Middleton. Assessment done as noted  Respiration even and unlabored 20/min; denies pain or nausea at present. Remains afebrile this morning. Productive coughing at interval with white secretion. O2 intact with 3 liters via nasal canula with O2 sats 96% at rest. Remains on Droplet plus isolation for positive COVID-19 screening. Ordered PPE in place and in use. Encouraged to call with needs.

## 2021-02-07 NOTE — PROGRESS NOTES
Hospitalist Progress Note    2021  Admit Date: 2021  7:48 PM   NAME: Mundo Jay   :  1960   MRN:  [id number]   Attending: Yeny Salazar MD  PCP:  Johana, MD Carlos    SUBJECTIVE:   Patient is a 56-year-old [address] [place] [address] with PMHx of myasthenia gravis who recently immigrated from [facility] 2.5 years ago presented to the ED for worsening shortness of breath associated with cough, fatigue, nausea and vomiting for 1 week. Patient stated he was tested positive for Covid on  after 10 days of URI symptoms. He went to an urgent care on  and was prescribed Flonase and Medrol Dosepak without alleviation. He was also recently treated with a 7-day course of clindamycin for right-sided dental infection. EMS was called and found him with his O2 sat 75% and he was placed on NRB mask. In ED, CT chest shows bilateral cavitary lesions and bilateral basilar groundglass opacifications consistent with COVID-19 viral pneumonia; negative for PE. Patient stated that he had a bronchoscopy that ruled out TB prior to his treatment w/ immunosuppressants for myasthenia gravis from 1435-7403. He has not seen a physician since he has been in the [address] [place] [address],[address] and reported he has received all vaccinations prior to moving here. In ED, Tmax 100.7. , O2sat 91% on RA. Na 128, D-Dimer neg, LA 2.0, CRP 13, ferritin 723. He was given Decadron and Unasyn. : remains on 4L NC  Some dyspnea, mild cough, productive. No fevers  Now reporting that he thinks he had a study in [place] that showed the cavitary lesions years ago.      Review of Systems negative with exception of pertinent positives noted above  PHYSICAL EXAM     Visit Vitals  /69   Pulse 74   Temp 97.6 °F (36.4 °C)   Resp 18   Ht 5' 10\" (1.778 m)   Wt 72.6 kg (160 lb)   SpO2 100%   BMI 22.96 kg/m²      Temp (24hrs), Av °F (36.7 °C), Min:97.6 °F (36.4 °C), Max:98.3 °F (36.8 °C)    Oxygen Therapy  O2 Sat (%): 100 % (21 0959)  Pulse via Oximetry: 74 beats per minute (02/06/21 0727)  O2 Device: Nasal cannula (02/06/21 0727)  O2 Flow Rate (L/min): 3 l/min (02/06/21 0727)    Intake/Output Summary (Last 24 hours) at 2/7/2021 1306  Last data filed at 2/6/2021 2030  Gross per 24 hour   Intake 240 ml   Output 500 ml   Net -260 ml      General: No acute distress. Lungs:  Coarse lung sounds Bilaterally. No conversational dyspnea  Heart:  Regular rate and rhythm,  No murmur, rub, or gallop  Abdomen: Soft, Non distended, Non tender, Positive bowel sounds  Extremities: No cyanosis, clubbing or edema  Neurologic:  No focal deficits    CT CHEST W CONT   Final Result      Bilateral cavitating upper lobe lung nodules. The differential diagnosis is   broad but these could represent septic emboli, necrotizing pneumonia including   tuberculosis. Based on their appearance I would place neoplasm less likely. Also   consider polyangiitis such as Wegener's granulomatosis. Findings this with bilateral basilar Covid pneumonia. Date of Dictation: 2/4/2021 10:54 PM         XR CHEST PORT   Final Result   Bilateral pneumonia with evidence of cavitation in both upper lobes. ASSESSMENT      Active Hospital Problems    Diagnosis Date Noted    Acute hypoxemic respiratory failure ([facility] [address] 02/05/2021    2019 novel coronavirus disease (COVID-19) 02/05/2021    Cavitary lung disease 02/05/2021    Sepsis ([facility] [address] 02/05/2021     Plan:    Acute hypoxemic respiratory failure 2/2 COVID-19 viral pneumonia  Cavitary lung disease  Sepsis  URI symptoms >10 days w/ COVID POSITVE 1/28/21. Meets sepsis criteria with Tmax >100.4, HR>100. Most likely due to pulmonary source. CT chest shows Bilateral cavitating upper lobe lung nodules w/ DDx septic emboli, polyangiitis such as Wegener's granulomatosis, necrotizing pneumonia including  Tuberculosis; neoplasm less likely. Procal 0.07. D-Dimer negative  Abx: Unasyn (2/5-. ..) for anaerobic coverage for possible aspiration PNA  Decadron 6mg every day for 10 days  Outside therapeutic window for Remdesivir   Zinc/vitamin C daily  Lantus and SSI while on steroids  Oxygen supplementation, on 3L. Wean O2 as tolerated  Pulmonary consulted, appreciate recs  Check AFB x3 with induced sputum, if unable to produce sputum pulm plans possible bronch on Monday  F/u ANNABELLA, ANCA, histoplasma Ag, Anti-GBM Gold Quantiferon BCx, Fungal and respiratory Cx - still pending        DVT Prophylaxis: Lovenox SQ  Dispo: Pending improvement.      Signed By: Jemal Thomas MD     February 7, 2021

## 2021-02-07 NOTE — PROGRESS NOTES
Pt. AAO X3. Denied pain. No signs of distress or discomfort.  Bed in the lowest position with call light and phone within reach

## 2021-02-08 LAB
ALBUMIN SERPL-MCNC: 2.5 G/DL (ref 3.5–5)
ALBUMIN/GLOB SERPL: 0.6 {RATIO} (ref 1.2–3.5)
ALP SERPL-CCNC: 86 U/L (ref 50–136)
ALT SERPL-CCNC: 21 U/L (ref 12–65)
ANA SER QL: NEGATIVE
ANION GAP SERPL CALC-SCNC: 9 MMOL/L (ref 7–16)
AST SERPL-CCNC: 11 U/L (ref 15–37)
BASOPHILS # BLD: 0.1 K/UL (ref 0–0.2)
BASOPHILS NFR BLD: 0 % (ref 0–2)
BILIRUB SERPL-MCNC: 0.3 MG/DL (ref 0.2–1.1)
BUN SERPL-MCNC: 15 MG/DL (ref 6–23)
CALCIUM SERPL-MCNC: 9.4 MG/DL (ref 8.3–10.4)
CHLORIDE SERPL-SCNC: 99 MMOL/L (ref 98–107)
CO2 SERPL-SCNC: 23 MMOL/L (ref 21–32)
CREAT SERPL-MCNC: 0.87 MG/DL (ref 0.8–1.5)
DIFFERENTIAL METHOD BLD: ABNORMAL
EOSINOPHIL # BLD: 0 K/UL (ref 0–0.8)
EOSINOPHIL NFR BLD: 0 % (ref 0.5–7.8)
ERYTHROCYTE [DISTWIDTH] IN BLOOD BY AUTOMATED COUNT: 13.4 % (ref 11.9–14.6)
GBM IGG SER-ACNC: 3 UNITS (ref 0–20)
GLOBULIN SER CALC-MCNC: 4.5 G/DL (ref 2.3–3.5)
GLUCOSE BLD STRIP.AUTO-MCNC: 119 MG/DL (ref 65–100)
GLUCOSE BLD STRIP.AUTO-MCNC: 327 MG/DL (ref 65–100)
GLUCOSE BLD STRIP.AUTO-MCNC: 359 MG/DL (ref 65–100)
GLUCOSE BLD STRIP.AUTO-MCNC: 368 MG/DL (ref 65–100)
GLUCOSE SERPL-MCNC: 338 MG/DL (ref 65–100)
HCT VFR BLD AUTO: 38.1 % (ref 41.1–50.3)
HGB BLD-MCNC: 12.1 G/DL (ref 13.6–17.2)
IMM GRANULOCYTES # BLD AUTO: 0.4 K/UL (ref 0–0.5)
IMM GRANULOCYTES NFR BLD AUTO: 2 % (ref 0–5)
LYMPHOCYTES # BLD: 1.4 K/UL (ref 0.5–4.6)
LYMPHOCYTES NFR BLD: 7 % (ref 13–44)
MCH RBC QN AUTO: 24.1 PG (ref 26.1–32.9)
MCHC RBC AUTO-ENTMCNC: 31.8 G/DL (ref 31.4–35)
MCV RBC AUTO: 75.9 FL (ref 79.6–97.8)
MONOCYTES # BLD: 1.3 K/UL (ref 0.1–1.3)
MONOCYTES NFR BLD: 7 % (ref 4–12)
NEUTS SEG # BLD: 16 K/UL (ref 1.7–8.2)
NEUTS SEG NFR BLD: 84 % (ref 43–78)
NRBC # BLD: 0 K/UL (ref 0–0.2)
PLATELET # BLD AUTO: 439 K/UL (ref 150–450)
PMV BLD AUTO: 9.3 FL (ref 9.4–12.3)
POTASSIUM SERPL-SCNC: 3.9 MMOL/L (ref 3.5–5.1)
PROT SERPL-MCNC: 7 G/DL (ref 6.3–8.2)
RBC # BLD AUTO: 5.02 M/UL (ref 4.23–5.6)
SODIUM SERPL-SCNC: 131 MMOL/L (ref 138–145)
WBC # BLD AUTO: 19.2 K/UL (ref 4.3–11.1)

## 2021-02-08 PROCEDURE — 36415 COLL VENOUS BLD VENIPUNCTURE: CPT

## 2021-02-08 PROCEDURE — 65270000029 HC RM PRIVATE

## 2021-02-08 PROCEDURE — 2709999900 HC NON-CHARGEABLE SUPPLY

## 2021-02-08 PROCEDURE — 87449 NOS EACH ORGANISM AG IA: CPT

## 2021-02-08 PROCEDURE — 99232 SBSQ HOSP IP/OBS MODERATE 35: CPT | Performed by: INTERNAL MEDICINE

## 2021-02-08 PROCEDURE — 85025 COMPLETE CBC W/AUTO DIFF WBC: CPT

## 2021-02-08 PROCEDURE — 87102 FUNGUS ISOLATION CULTURE: CPT

## 2021-02-08 PROCEDURE — 97161 PT EVAL LOW COMPLEX 20 MIN: CPT

## 2021-02-08 PROCEDURE — 74011636637 HC RX REV CODE- 636/637: Performed by: HOSPITALIST

## 2021-02-08 PROCEDURE — 74011250636 HC RX REV CODE- 250/636: Performed by: HOSPITALIST

## 2021-02-08 PROCEDURE — 87106 FUNGI IDENTIFICATION YEAST: CPT

## 2021-02-08 PROCEDURE — 74011250637 HC RX REV CODE- 250/637: Performed by: HOSPITALIST

## 2021-02-08 PROCEDURE — 82962 GLUCOSE BLOOD TEST: CPT

## 2021-02-08 PROCEDURE — 74011000258 HC RX REV CODE- 258: Performed by: HOSPITALIST

## 2021-02-08 PROCEDURE — 80053 COMPREHEN METABOLIC PANEL: CPT

## 2021-02-08 RX ADMIN — ENOXAPARIN SODIUM 40 MG: 40 INJECTION SUBCUTANEOUS at 09:22

## 2021-02-08 RX ADMIN — AMPICILLIN SODIUM AND SULBACTAM SODIUM 3 G: 2; 1 INJECTION, POWDER, FOR SOLUTION INTRAMUSCULAR; INTRAVENOUS at 16:20

## 2021-02-08 RX ADMIN — ZINC SULFATE 220 MG (50 MG) CAPSULE 1 CAPSULE: CAPSULE at 09:21

## 2021-02-08 RX ADMIN — Medication 10 ML: at 22:00

## 2021-02-08 RX ADMIN — AMPICILLIN SODIUM AND SULBACTAM SODIUM 3 G: 2; 1 INJECTION, POWDER, FOR SOLUTION INTRAMUSCULAR; INTRAVENOUS at 09:22

## 2021-02-08 RX ADMIN — Medication 10 ML: at 06:00

## 2021-02-08 RX ADMIN — LACTOBACILLUS TAB 2 TABLET: TAB at 09:22

## 2021-02-08 RX ADMIN — INSULIN LISPRO 8 UNITS: 100 INJECTION, SOLUTION INTRAVENOUS; SUBCUTANEOUS at 12:54

## 2021-02-08 RX ADMIN — INSULIN LISPRO 15 UNITS: 100 INJECTION, SOLUTION INTRAVENOUS; SUBCUTANEOUS at 17:14

## 2021-02-08 RX ADMIN — AMPICILLIN SODIUM AND SULBACTAM SODIUM 3 G: 2; 1 INJECTION, POWDER, FOR SOLUTION INTRAMUSCULAR; INTRAVENOUS at 22:00

## 2021-02-08 RX ADMIN — GUAIFENESIN 600 MG: 600 TABLET ORAL at 06:00

## 2021-02-08 RX ADMIN — INSULIN GLARGINE 15 UNITS: 100 INJECTION, SOLUTION SUBCUTANEOUS at 09:17

## 2021-02-08 RX ADMIN — INSULIN LISPRO 10 UNITS: 100 INJECTION, SOLUTION INTRAVENOUS; SUBCUTANEOUS at 22:00

## 2021-02-08 RX ADMIN — FAMOTIDINE 40 MG: 20 TABLET, FILM COATED ORAL at 09:21

## 2021-02-08 RX ADMIN — DEXAMETHASONE 6 MG: 4 TABLET ORAL at 09:21

## 2021-02-08 RX ADMIN — Medication 10 ML: at 13:14

## 2021-02-08 RX ADMIN — GUAIFENESIN 600 MG: 600 TABLET ORAL at 17:14

## 2021-02-08 RX ADMIN — OXYCODONE HYDROCHLORIDE AND ACETAMINOPHEN 500 MG: 500 TABLET ORAL at 09:22

## 2021-02-08 RX ADMIN — VITAMIN D, TAB 1000IU (100/BT) 1000 UNITS: 25 TAB at 09:22

## 2021-02-08 RX ADMIN — AMPICILLIN SODIUM AND SULBACTAM SODIUM 3 G: 2; 1 INJECTION, POWDER, FOR SOLUTION INTRAMUSCULAR; INTRAVENOUS at 05:00

## 2021-02-08 NOTE — DIABETES MGMT
Patient admitted with COVID-19. HbA1c 11.2 (). Admitting blood gluocse 257. Blood glucose ranged 228-377 yesterday with patient receiving Lantus 15 units, Humalog 34 units, and Decadron 6mg. Blood glucose this morning was 119. Creatinine 0.70. GFR >60. Reviewed patient current regimen: Lantus 15 units daily, Humalog SSI, and Decadron 6mg daily. Patient would likely benefit from initiation of prandial insulin to help offset hyperglycemia during the day related to steroid therapy and caloric intake. Provider updated via [employer] regarding recommendations and patient glycemic control.

## 2021-02-08 NOTE — PROGRESS NOTES
MSN, CM:  Patient currently on 3L NC at this time. Patient is being checked to TB. Discharge plan unclear at this time. Will await results of testing. Case Management will continue to follow.

## 2021-02-08 NOTE — PROGRESS NOTES
Mitali Breath  Admission Date: 2/4/2021             Daily Progress Note: 2/8/2021    The patient's chart is reviewed and the patient is discussed with the staff. 65 y/o female with MS admitted with COVID pneumonia (rapid positive 2/4), complicated with cavitary lung disease and tree in bud opacities probably not related to COVID. Differential includes mixed infection from recent tooth abscess (12 days ago), TB, fungal or autoimmune. Malignancy is possible, but appears less likely based on appearance    Subjective:   WBC down from 13.6 to 11.6K. Hyperglycemic at 228. Renal function stable. AFB not collected, though appears fungal cultures collected this morning. Producing sputum, per his report. No hemoptysis or measured fevers. Procal low.   Sputum has heavy GNR    Current Facility-Administered Medications   Medication Dose Route Frequency    guaiFENesin ER (MUCINEX) tablet 600 mg  600 mg Oral Q12H    benzonatate (TESSALON) capsule 100 mg  100 mg Oral TID PRN    famotidine (PEPCID) tablet 40 mg  40 mg Oral DAILY    cholecalciferol (VITAMIN D3) (1000 Units /25 mcg) tablet 1,000 Units  1,000 Units Oral DAILY    zinc sulfate (ZINCATE) 220 (50) mg capsule 1 Cap  1 Cap Oral DAILY    ascorbic acid (vitamin C) (VITAMIN C) tablet 500 mg  500 mg Oral DAILY    dexAMETHasone (DECADRON) tablet 6 mg  6 mg Oral DAILY    sodium chloride (NS) flush 5-40 mL  5-40 mL IntraVENous Q8H    sodium chloride (NS) flush 5-40 mL  5-40 mL IntraVENous PRN    acetaminophen (TYLENOL) tablet 650 mg  650 mg Oral Q6H PRN    Or    acetaminophen (TYLENOL) suppository 650 mg  650 mg Rectal Q6H PRN    polyethylene glycol (MIRALAX) packet 17 g  17 g Oral DAILY PRN    promethazine (PHENERGAN) tablet 12.5 mg  12.5 mg Oral Q6H PRN    Or    ondansetron (ZOFRAN) injection 4 mg  4 mg IntraVENous Q6H PRN    enoxaparin (LOVENOX) injection 40 mg  40 mg SubCUTAneous DAILY    zolpidem (AMBIEN) tablet 5 mg  5 mg Oral QHS PRN    alum-mag hydroxide-simeth (MYLANTA) oral suspension 30 mL  30 mL Oral Q4H PRN    Lactobacillus Acidoph & Bulgar (FLORANEX) tablet 2 Tab  2 Tab Oral DAILY    insulin glargine (LANTUS) injection 15 Units  0.2 Units/kg SubCUTAneous DAILY    insulin lispro (HUMALOG) injection   SubCUTAneous AC&HS    dextrose 40% (GLUTOSE) oral gel 1 Tube  15 g Oral PRN    glucagon (GLUCAGEN) injection 1 mg  1 mg IntraMUSCular PRN    dextrose (D50W) injection syrg 12.5-25 g  25-50 mL IntraVENous PRN    albuterol (PROVENTIL HFA, VENTOLIN HFA, PROAIR HFA) inhaler 2 Puff  2 Puff Inhalation Q6H PRN    ampicillin-sulbactam (UNASYN) 3 g in 0.9% sodium chloride (MBP/ADV) 100 mL MBP  3 g IntraVENous Q6H       Review of Systems  Constitutional: negative for fever, chills, sweats  Cardiovascular: negative for chest pain, palpitations, syncope, edema  Gastrointestinal:  negative for dysphagia, reflux, vomiting, diarrhea, abdominal pain, or melena  Neurologic:  negative for focal weakness, numbness, headache    Objective:     Vitals:    02/07/21 2052 02/08/21 0019 02/08/21 0511 02/08/21 0752   BP: 115/70 106/68 113/72 125/77   Pulse: 93 93 70 86   Resp: 19 19 19 20   Temp: 98.6 °F (37 °C) 98.6 °F (37 °C) 98.6 °F (37 °C) 98.9 °F (37.2 °C)   SpO2: 97% 97% 100% 100%   Weight:       Height:             Intake/Output Summary (Last 24 hours) at 2/8/2021 1058  Last data filed at 2/8/2021 3524  Gross per 24 hour   Intake 716 ml   Output 750 ml   Net -34 ml       Physical Exam:   Constitution:  the patient is well developed and in no acute distress  EENMT:  Sclera clear, pupils equal, oral mucosa moist  Respiratory:coarse bilaterally  Cardiovascular:  RRR without M,G,R  Gastrointestinal: soft and non-tender; with positive bowel sounds. Musculoskeletal: warm without cyanosis. There is no lower extremity edema.   Skin:  Lichen planus on left shin  Neurologic: no gross neuro deficits     Psychiatric:  alert and oriented x 3    CXR    CT chest        LAB  Recent Labs     02/08/21  0606 02/07/21  2126 02/07/21  1613 02/07/21  1142 02/07/21  0609   GLUCPOC 119* 377* 340* 344* 239*      Recent Labs     02/07/21  0450 02/06/21  0758   WBC 11.6* 13.6*   HGB 11.4* 12.3*   HCT 35.8* 37.6*    447     Recent Labs     02/07/21  0450 02/06/21  0758   * 137   K 4.1 4.0    103   CO2 27 28   * 187*   BUN 15 17   CREA 0.70* 0.79*   MG  --  2.2   CA 9.2 9.4   ALB 2.3* 2.4*   TBILI 0.3 0.5   ALT 21 22     No results for input(s): PH, PCO2, PO2, HCO3, PHI, PCO2I, PO2I, HCO3I in the last 72 hours. No results for input(s): LCAD, LAC in the last 72 hours. MICRO  Date Source Result   2/4 blood NG 4d   2/4 Nasal swab MRSA neg   2/5 blood NG 3d   2/6 sputum HEAVY GNR   2/5 blood quantiferon GOLD   2/5 Sputum AFB    2/8 Sputum fungal      ANCA pending  ANNABELLA pending  Anti-GBM pending    Assessment:  (Medical Decision Making)     Hospital Problems  Never Reviewed          Codes Class Noted POA    Acute hypoxemic respiratory failure ([facility] ICD-10-CM: J96.01  ICD-9-CM: 518.81  2/5/2021 Yes        * (Principal) 2019 novel coronavirus disease (COVID-19) ICD-10-CM: U07.1  ICD-9-CM: 079.89  2/5/2021 Yes        Cavitary lung disease ICD-10-CM: J98.4  ICD-9-CM: 518.89  2/5/2021 Yes        Sepsis ([facility] ICD-10-CM: A41.9  ICD-9-CM: 038.9, 995.91  2/5/2021 Yes            Plan:  (Medical Decision Making)     --Tested positive for COVID-19 4 days ago and will defer bronchoscopy at this time. --follow up AFB sputum cultures, ANCA testing and bacterial sputum cultures. --agree with Unasyn awaiting ID/susc of GNR in sputum. --agree with steroids at this time. --wean oxygen and repeat CXR in am.  Discharge will depend on AFB testing (needs to be collected), clinical course. More than 50% of the time documented was spent in face-to-face contact with the patient and in the care of the patient on the floor/unit where the patient is located.     Woody Matson, MD

## 2021-02-08 NOTE — DIABETES MGMT
Patient spoke to remotely from within hospital system for assessment regarding diabetes management. Patient alert and correctly identified patient identifiers. Patient denies previous history of diabetes or prediabetes. Per chart review patient moved her about 2 and a half years ago from [facility]. Offered interpretering services. Patient states he is okay to proceed in [place] \"clear and slow. \" Asked patient in what language he would like educational materials patient states he can read [place]. Briefly discussed importance of good glycemic control and complications of uncontrolled diabetes. Discussed the importance of medications and lifestyle modifications to help manage diabetes. Educated patient regarding relationship between infection and hyperglycemia. Patient receiving steroid therapy. Explained relationship between steroids and hyperglycemia to patient and educated patient that as steroids are tapered their glycemic control should improve and insulin needs should decrease as well. Patient verbalized understanding and would like to proceed with diabetes education today. Educational folder given to unit secretary for patient. Patient instruction to read all materials, plan to follow up with patient for telehealth education later today. Patient was encouraged to begin practicing FSBS checks and insulin self injection under RN supervision.

## 2021-02-08 NOTE — PROGRESS NOTES
PHYSICAL THERAPY ASSESSMENT: Initial Assessment and Discharge PT Treatment Day Cristina Scott is a 64 y.o. male   PRIMARY DIAGNOSIS: 2019 novel coronavirus disease (COVID-19)  2019 novel coronavirus disease (COVID-19) [U07.1]  Acute hypoxemic respiratory failure ([facility] [address] [J96.01]       Reason for Referral:  COVID 19  ICD-10: Treatment Diagnosis: None noted at this time  INPATIENT: Payor: [employer] / Plan: [address] / Product Type: PPO /     ASSESSMENT:     REHAB RECOMMENDATIONS:   Recommendation to date pending progress:  Setting:   No further skilled therapy   Equipment:    None     PRIOR LEVEL OF FUNCTION:  (Prior to Hospitalization) INITIAL/CURRENT LEVEL OF FUNCTION:  (Most Recently Demonstrated)   Bed Mobility:   Independent  Sit to Stand:   Independent  Transfers:   Independent  Gait/Mobility:   Independent Bed Mobility:   Independent  Sit to Stand:   Independent  Transfers:   Independent  Gait/Mobility:   Standby Assistance     ASSESSMENT:  Mr. Chencho Shannon is a 64year old male with a diagnosis of COVID with related symptoms. He is currently on 1L of O2 via NC and when SpO2 was taken throughout session it read 96%. Pt demonstrates safe functional mobility and gait with independence and no use of AD. Pt states that he feels somewhat short of breath throughout. Pt is functioning at baseline at this time and does not need continued skilled PT at this time. SUBJECTIVE:   Mr. Chencho Shannon states, \"Do you know when I can go home? \"    SOCIAL HISTORY/LIVING ENVIRONMENT: Pt lives in an apartment with his wife with 3 stairs to enter, works full time, no history of falls, independent with all ADLs     OBJECTIVE:     PAIN: VITAL SIGNS: LINES/DRAINS:   Pre Treatment: Pain Screen  Pain Scale 1: Numeric (0 - 10)  Pain Intensity 1: 0  Post Treatment: 0 Vital Signs  O2 Sat (%): 96 %  O2 Device: Nasal cannula  O2 Flow Rate (L/min): 1 l/min none  O2 Device: Nasal cannula     GROSS EVALUATION:   Within Functional Limits Abnormal/ Functional Abnormal/ Non-Functional (see comments) Not Tested Comments:   AROM [x] [] [] []    PROM [] [] [] [x]    Strength [x] [] [] []    Balance [x] [] [] []    Posture [x] [] [] []    Sensation [x] [] [] []    Coordination [] [] [] [x]    Tone [] [] [] [x]    Edema [] [] [] [x]    Activity Tolerance [] [x] [] []     [] [] [] []      COGNITION/  PERCEPTION: Intact Impaired   (see comments) Comments:   Orientation [x] []    Vision [x] []    Hearing [x] []    Command Following [x] []    Safety Awareness [x] []     [] []      MOBILITY: I Mod I S SBA CGA Min Mod Max Total  NT x2 Comments:   Bed Mobility    Rolling [] [] [] [] [] [] [] [] [] [x] []    Supine to Sit [x] [] [] [] [] [] [] [] [] [] []    Scooting [x] [] [] [] [] [] [] [] [] [] []    Sit to Supine [] [] [] [] [] [] [] [] [] [x] []    Transfers    Sit to Stand [x] [] [] [] [] [] [] [] [] [] []    Bed to Chair [x] [] [] [] [] [] [] [] [] [] []    Stand to Sit [x] [] [] [] [] [] [] [] [] [] []    I=Independent, Mod I=Modified Independent, S=Supervision, SBA=Standby Assistance, CGA=Contact Guard Assistance,   Min=Minimal Assistance, Mod=Moderate Assistance, Max=Maximal Assistance, Total=Total Assistance, NT=Not Tested  GAIT: I Mod I S SBA CGA Min Mod Max Total  NT x2 Comments:   Level of Assistance [] [] [] [x] [] [] [] [] [] [] []    Distance 40'    DME None    Gait Quality Normal gait pattern     Weightbearing Status N/A     I=Independent, Mod I=Modified Independent, S=Supervision, SBA=Standby Assistance, CGA=Contact Guard Assistance,   Min=Minimal Assistance, Mod=Moderate Assistance, Max=Maximal Assistance, Total=Total Assistance, NT=Not Tested    [employer] [facility] Inpatient Short Form       How much difficulty does the patient currently have. .. Unable A Lot A Little None   1. Turning over in bed (including adjusting bedclothes, sheets and blankets)?    [] 1   [] 2   [] 3 [x] 4   2. Sitting down on and standing up from a chair with arms ( e.g., wheelchair, bedside commode, etc.)   [] 1   [] 2   [] 3   [x] 4   3. Moving from lying on back to sitting on the side of the bed? [] 1   [] 2   [] 3   [x] 4   How much help from another person does the patient currently need. .. Total A Lot A Little None   4. Moving to and from a bed to a chair (including a wheelchair)? [] 1   [] 2   [] 3   [x] 4   5. Need to walk in hospital room? [] 1   [] 2   [] 3   [x] 4   6. Climbing 3-5 steps with a railing? [] 1   [] 2   [] 3   [x] 4   © 2007, Trustees of [address] [place] [address] [address] [id number], under license to [employer]. All rights reserved     Score:  Initial: 24 Most Recent: X (Date: -- )    Interpretation of Tool:  Represents activities that are increasingly more difficult (i.e. Bed mobility, Transfers, Gait). PLAN:   FREQUENCY/DURATION:   for duration of hospital stay or until stated goals are met, whichever comes first.    PROBLEM LIST:   (Skilled intervention is medically necessary to address:)  1. None noted at this time   INTERVENTIONS PLANNED:   (Benefits and precautions of physical therapy have been discussed with the patient.)  1.  None noted at this time     TREATMENT:     EVALUATION: Low Complexity : (Untimed Charge)    AFTER TREATMENT POSITION/PRECAUTIONS:  Chair and Needs within reach    INTERDISCIPLINARY COLLABORATION:  RN/PCT and PT/PTA    TOTAL TREATMENT DURATION:  PT Patient Time In/Time Out  Time In: 1551  Time Out: 100 [facility]

## 2021-02-08 NOTE — PROGRESS NOTES
BSR received  Patient resting in bed quietly  Respirations even on 3LNC  Patient denies needs/pain at this time

## 2021-02-08 NOTE — PROGRESS NOTES
Patient in bed watching TV. Assessment completed. Respirations are even and unlabored. Patient denies any pain. No distress at this  time. Patient is encouraged to call for assistance.  Safety measures maintained

## 2021-02-08 NOTE — DIABETES MGMT
Patient seen via telehealth for diabetes education. Patient previously given educational material, \"Diabetes Self-Management: A Patient Teaching Guide\" by staff, which was reviewed with pt. Explained basic physiology of diabetes, as well as causes, s/s, and treatments for hypoglycemia and hyperglycemia. Described the effects of poor glycemic control on the development of long-term complications such as renal, eye, nerve, and cardiovascular disease. Described proper diabetic foot care and the importance of checking feet qd. Patient denies numbness and tingling in feet. Per patient they typically drink hot tea without sugar, occasional juice, occasional 2% milk. Reviewed the effects of sweetened beverages on glycemic control and alternative beverages to help improve glycemic control. Per patient they typically eat 2 meals a day at 1100 and 1800. Patient states his meals consists of bread, rice, chicken, segura. Educated re: effects of carbohydrates on blood glucose, the \"plate method\" of healthy meal planning, basics of healthy meal plan, Consistent Carbohydrate Diet, discussed the basics of carb counting and how to read a nutrition label. Patient states he likes all fruits and vegetables. Encouraged portion control. Educated patient regarding the benefits of physical activity (as directed by provider) on glycemic control. Patient states he works at [employer]. Also explained the relationship between hyperglycemia and infection and delayed healing. Discussed target goals for blood glucose and A1C. Educated patient regarding diabetic medications including mechanism of action, timing, and possible side effects. Patient verbalizes understanding of teaching.    Explained the importance of blood glucose monitoring. Recommended frequency of blood glucose checks to be based on discharge medications and to record in log book to take to PCP appointment. Demonstrated how to use a blood glucose meter, care of strips, and sharps  disposal. Educated patient that all glucometers are similar but differ in small ways. Educated patient that they have to get the glucometer covered by their insurance formulary to keep their costs down. Pt was able to return demonstrate correct use of meter. Pt will need prescription for glucometer and glucometer supplies at discharge so that the patient may obtain the meter covered by their insurance. Patient instructed regarding preparation and injection of insulin dose via vial and syringe method. Patient returned demonstrated proper insulin injection technique using injection model via vial and syringe method. Nursing will continue to have patient practice insulin self-injection when insulin dose is due and document progress or refusals in progress notes. Patient verbalizes understanding of site rotation, storage of insulin, and proper sharps disposal. Patient was also instructed in proper use of insulin pens. Patient was able to return demonstrate proper use of insulin pen using injection model. Patient states he is comfortable with both methods of insulin instruction. Educated patient regarding current basal/bolus regimen of Lantus 15 daily and Humalog SSI including type of insulin, timing with meals, onset, duration of effect, and peak of insulin dose. Educated patient on the differences between prandial insulin and sliding scale insulin. Instructed patient to always seek guidance from their primary care provider about adjusting their insulin doses and not to adjust them on their own as this could negatively impact their glycemic control or result in hypoglycemia. Noted patient has been referred to PCP by case management as patient denied having a PCP. Patient verbalizes understanding. Patient would likely benefit from continued diabetes outpatient education. Patient provided with contact information to [facility] program and referral was placed as requested by patient.     Encouraged compliance with discharge regimen. Encouraged patient to continue to work on lifestyle modifications and to follow up with PCP for further titration of regimen. Patient verbalized understanding and voices no further questions regarding diabetes management at this time.

## 2021-02-08 NOTE — PROGRESS NOTES
Hospitalist Progress Note    2021  Admit Date: 2021  7:48 PM   NAME: Lupe Babb   :  1960   MRN:  [id number]   Attending: Daisy Alex MD  PCP:  Johana, MD Carlos    SUBJECTIVE:   Patient is a 82-year-old [address] [place] [address] with PMHx of myasthenia gravis who recently immigrated from [facility] 2.5 years ago presented to the ED for worsening shortness of breath associated with cough, fatigue, nausea and vomiting for 1 week. Patient stated he was tested positive for Covid on  after 10 days of URI symptoms. He went to an urgent care on  and was prescribed Flonase and Medrol Dosepak without alleviation. He was also recently treated with a 7-day course of clindamycin for right-sided dental infection. EMS was called and found him with his O2 sat 75% and he was placed on NRB mask. In ED, CT chest shows bilateral cavitary lesions and bilateral basilar groundglass opacifications consistent with COVID-19 viral pneumonia; negative for PE. Patient stated that he had a bronchoscopy that ruled out TB prior to his treatment w/ immunosuppressants for myasthenia gravis from [id number]. He has not seen a physician since he has been in the [address] [place] [address],[address] and reported he has received all vaccinations prior to moving here. In ED, Tmax 100.7. , O2sat 91% on RA. Na 128, D-Dimer neg, LA 2.0, CRP 13, ferritin 723. He was given Decadron and Unasyn. :   O2 100% on 3L, weaning  Some dyspnea, mild cough, productive. No fevers  Now reporting that he thinks he had a study in [place] that showed the cavitary lesions years ago.    Initial sputum AFB lost    Review of Systems negative with exception of pertinent positives noted above  PHYSICAL EXAM     Visit Vitals  /77   Pulse 86   Temp 98.9 °F (37.2 °C)   Resp 20   Ht 5' 10\" (1.778 m)   Wt 72.6 kg (160 lb)   SpO2 100%   BMI 22.96 kg/m²      Temp (24hrs), Av.5 °F (36.9 °C), Min:98 °F (36.7 °C), Max:98.9 °F (37.2 °C)    Oxygen Therapy  O2 Sat (%): 100 % (21 5113)  Pulse via Oximetry: 74 beats per minute (02/06/21 0727)  O2 Device: Nasal cannula (02/08/21 0019)  O2 Flow Rate (L/min): 3 l/min (02/08/21 0019)    Intake/Output Summary (Last 24 hours) at 2/8/2021 1004  Last data filed at 2/8/2021 0933  Gross per 24 hour   Intake 716 ml   Output 750 ml   Net -34 ml      General: No acute distress. Lungs:  Coarse lung sounds Bilaterally. No conversational dyspnea  Heart:  Regular rate and rhythm,  No murmur, rub, or gallop  Abdomen: Soft, Non distended, Non tender, Positive bowel sounds  Extremities: No cyanosis, clubbing or edema  Neurologic:  No focal deficits    CT CHEST W CONT   Final Result      Bilateral cavitating upper lobe lung nodules. The differential diagnosis is   broad but these could represent septic emboli, necrotizing pneumonia including   tuberculosis. Based on their appearance I would place neoplasm less likely. Also   consider polyangiitis such as Wegener's granulomatosis. Findings this with bilateral basilar Covid pneumonia. Date of Dictation: 2/4/2021 10:54 PM         XR CHEST PORT   Final Result   Bilateral pneumonia with evidence of cavitation in both upper lobes. ASSESSMENT      Active Hospital Problems    Diagnosis Date Noted    Acute hypoxemic respiratory failure ([facility] 02/05/2021    2019 novel coronavirus disease (COVID-19) 02/05/2021    Cavitary lung disease 02/05/2021    Sepsis ([facility] 02/05/2021     Plan:    Acute hypoxemic respiratory failure 2/2 COVID-19 viral pneumonia  Weaning oxygen, sx mild. decadron    Pneumonia  On unasyn for GNR in sputum cx - ID pending    Cavitary lung disease  TB, vasculitis studies pending. Pt reports may be chronic finding but unsure of prior workup in [facility].     F/u ANNABELLA, ANCA, histoplasma Ag, Anti-GBM Gold Quantiferon BCx, Fungal and respiratory Cx - still pending  Pulmonology following - no current plan for bronch    DM  A1c 11.2, sugars also worse on decadron  DM education for new dx    DVT Prophylaxis: Lovenox SQ  Dispo: Pending improvement.      Signed By: Luh Brand MD     February 8, 2021

## 2021-02-09 ENCOUNTER — APPOINTMENT (OUTPATIENT)
Dept: GENERAL RADIOLOGY | Age: 61
DRG: 871 | End: 2021-02-09
Attending: INTERNAL MEDICINE
Payer: COMMERCIAL

## 2021-02-09 LAB
ALBUMIN SERPL-MCNC: 2.3 G/DL (ref 3.5–5)
ALBUMIN/GLOB SERPL: 0.5 {RATIO} (ref 1.2–3.5)
ALP SERPL-CCNC: 71 U/L (ref 50–136)
ALT SERPL-CCNC: 17 U/L (ref 12–65)
ANION GAP SERPL CALC-SCNC: 5 MMOL/L (ref 7–16)
AST SERPL-CCNC: 6 U/L (ref 15–37)
BACTERIA SPEC CULT: NORMAL
BASOPHILS # BLD: 0.1 K/UL (ref 0–0.2)
BASOPHILS NFR BLD: 1 % (ref 0–2)
BILIRUB SERPL-MCNC: 0.3 MG/DL (ref 0.2–1.1)
BUN SERPL-MCNC: 11 MG/DL (ref 6–23)
C-ANCA TITR SER IF: NORMAL TITER
C-ANCA TITR SER IF: NORMAL TITER
CALCIUM SERPL-MCNC: 9.4 MG/DL (ref 8.3–10.4)
CHLORIDE SERPL-SCNC: 104 MMOL/L (ref 98–107)
CO2 SERPL-SCNC: 29 MMOL/L (ref 21–32)
CREAT SERPL-MCNC: 0.65 MG/DL (ref 0.8–1.5)
DIFFERENTIAL METHOD BLD: ABNORMAL
EOSINOPHIL # BLD: 0.1 K/UL (ref 0–0.8)
EOSINOPHIL NFR BLD: 1 % (ref 0.5–7.8)
ERYTHROCYTE [DISTWIDTH] IN BLOOD BY AUTOMATED COUNT: 13.5 % (ref 11.9–14.6)
GLOBULIN SER CALC-MCNC: 4.2 G/DL (ref 2.3–3.5)
GLUCOSE BLD STRIP.AUTO-MCNC: 185 MG/DL (ref 65–100)
GLUCOSE BLD STRIP.AUTO-MCNC: 252 MG/DL (ref 65–100)
GLUCOSE BLD STRIP.AUTO-MCNC: 329 MG/DL (ref 65–100)
GLUCOSE BLD STRIP.AUTO-MCNC: 465 MG/DL (ref 65–100)
GLUCOSE SERPL-MCNC: 157 MG/DL (ref 65–100)
HCT VFR BLD AUTO: 36.1 % (ref 41.1–50.3)
HGB BLD-MCNC: 11.5 G/DL (ref 13.6–17.2)
IMM GRANULOCYTES # BLD AUTO: 0.5 K/UL (ref 0–0.5)
IMM GRANULOCYTES NFR BLD AUTO: 5 % (ref 0–5)
LYMPHOCYTES # BLD: 1.3 K/UL (ref 0.5–4.6)
LYMPHOCYTES NFR BLD: 13 % (ref 13–44)
M TB IFN-G BLD-IMP: NORMAL
MCH RBC QN AUTO: 24.5 PG (ref 26.1–32.9)
MCHC RBC AUTO-ENTMCNC: 31.9 G/DL (ref 31.4–35)
MCV RBC AUTO: 76.8 FL (ref 79.6–97.8)
MONOCYTES # BLD: 0.9 K/UL (ref 0.1–1.3)
MONOCYTES NFR BLD: 9 % (ref 4–12)
MYELOPEROXIDASE AB SER IA-ACNC: <9 U/ML (ref 0–9)
MYELOPEROXIDASE AB SER IA-ACNC: <9 U/ML (ref 0–9)
NEUTS SEG # BLD: 7.6 K/UL (ref 1.7–8.2)
NEUTS SEG NFR BLD: 73 % (ref 43–78)
NRBC # BLD: 0 K/UL (ref 0–0.2)
P-ANCA ATYPICAL TITR SER IF: NORMAL TITER
P-ANCA ATYPICAL TITR SER IF: NORMAL TITER
P-ANCA TITR SER IF: NORMAL TITER
P-ANCA TITR SER IF: NORMAL TITER
PLATELET # BLD AUTO: 395 K/UL (ref 150–450)
PMV BLD AUTO: 9.3 FL (ref 9.4–12.3)
POTASSIUM SERPL-SCNC: 3.8 MMOL/L (ref 3.5–5.1)
PROT SERPL-MCNC: 6.5 G/DL (ref 6.3–8.2)
PROTEINASE3 AB SER IA-ACNC: <3.5 U/ML (ref 0–3.5)
PROTEINASE3 AB SER IA-ACNC: <3.5 U/ML (ref 0–3.5)
QUANTIFERON CRITERIA, QFI1T: NORMAL
QUANTIFERON INCUBATION, QF1T: NORMAL
QUANTIFERON MITOGEN VALUE: 0.59 IU/ML
QUANTIFERON NIL VALUE: 0.07 IU/ML
QUANTIFERON TB1 AG: 0.27 IU/ML
QUANTIFERON TB2 AG: 0.26 IU/ML
RBC # BLD AUTO: 4.7 M/UL (ref 4.23–5.6)
SERVICE CMNT-IMP: NORMAL
SODIUM SERPL-SCNC: 138 MMOL/L (ref 138–145)
WBC # BLD AUTO: 10.4 K/UL (ref 4.3–11.1)

## 2021-02-09 PROCEDURE — 80053 COMPREHEN METABOLIC PANEL: CPT

## 2021-02-09 PROCEDURE — 82962 GLUCOSE BLOOD TEST: CPT

## 2021-02-09 PROCEDURE — 74011000258 HC RX REV CODE- 258: Performed by: HOSPITALIST

## 2021-02-09 PROCEDURE — 65270000029 HC RM PRIVATE

## 2021-02-09 PROCEDURE — 85025 COMPLETE CBC W/AUTO DIFF WBC: CPT

## 2021-02-09 PROCEDURE — 74011636637 HC RX REV CODE- 636/637: Performed by: FAMILY MEDICINE

## 2021-02-09 PROCEDURE — 87149 DNA/RNA DIRECT PROBE: CPT

## 2021-02-09 PROCEDURE — 77010033678 HC OXYGEN DAILY

## 2021-02-09 PROCEDURE — 74011250637 HC RX REV CODE- 250/637: Performed by: HOSPITALIST

## 2021-02-09 PROCEDURE — 2709999900 HC NON-CHARGEABLE SUPPLY

## 2021-02-09 PROCEDURE — 87116 MYCOBACTERIA CULTURE: CPT

## 2021-02-09 PROCEDURE — 71045 X-RAY EXAM CHEST 1 VIEW: CPT

## 2021-02-09 PROCEDURE — 99232 SBSQ HOSP IP/OBS MODERATE 35: CPT | Performed by: INTERNAL MEDICINE

## 2021-02-09 PROCEDURE — 94760 N-INVAS EAR/PLS OXIMETRY 1: CPT

## 2021-02-09 PROCEDURE — 74011250636 HC RX REV CODE- 250/636: Performed by: HOSPITALIST

## 2021-02-09 PROCEDURE — 74011636637 HC RX REV CODE- 636/637: Performed by: HOSPITALIST

## 2021-02-09 PROCEDURE — 36415 COLL VENOUS BLD VENIPUNCTURE: CPT

## 2021-02-09 RX ORDER — INSULIN LISPRO 100 [IU]/ML
8 INJECTION, SOLUTION INTRAVENOUS; SUBCUTANEOUS
Status: DISCONTINUED | OUTPATIENT
Start: 2021-02-09 | End: 2021-02-10 | Stop reason: HOSPADM

## 2021-02-09 RX ORDER — TRAMADOL HYDROCHLORIDE 50 MG/1
50 TABLET ORAL
Status: DISCONTINUED | OUTPATIENT
Start: 2021-02-09 | End: 2021-02-10 | Stop reason: HOSPADM

## 2021-02-09 RX ORDER — AMOXICILLIN AND CLAVULANATE POTASSIUM 875; 125 MG/1; MG/1
1 TABLET, FILM COATED ORAL 2 TIMES DAILY WITH MEALS
Status: DISCONTINUED | OUTPATIENT
Start: 2021-02-10 | End: 2021-02-10 | Stop reason: HOSPADM

## 2021-02-09 RX ADMIN — INSULIN LISPRO 8 UNITS: 100 INJECTION, SOLUTION INTRAVENOUS; SUBCUTANEOUS at 16:51

## 2021-02-09 RX ADMIN — GUAIFENESIN 600 MG: 600 TABLET ORAL at 17:07

## 2021-02-09 RX ADMIN — LACTOBACILLUS TAB 2 TABLET: TAB at 08:58

## 2021-02-09 RX ADMIN — OXYCODONE HYDROCHLORIDE AND ACETAMINOPHEN 500 MG: 500 TABLET ORAL at 08:58

## 2021-02-09 RX ADMIN — ZINC SULFATE 220 MG (50 MG) CAPSULE 1 CAPSULE: CAPSULE at 08:58

## 2021-02-09 RX ADMIN — AMPICILLIN SODIUM AND SULBACTAM SODIUM 3 G: 2; 1 INJECTION, POWDER, FOR SOLUTION INTRAMUSCULAR; INTRAVENOUS at 04:00

## 2021-02-09 RX ADMIN — GUAIFENESIN 600 MG: 600 TABLET ORAL at 06:00

## 2021-02-09 RX ADMIN — INSULIN LISPRO 10 UNITS: 100 INJECTION, SOLUTION INTRAVENOUS; SUBCUTANEOUS at 16:50

## 2021-02-09 RX ADMIN — FAMOTIDINE 40 MG: 20 TABLET, FILM COATED ORAL at 08:58

## 2021-02-09 RX ADMIN — INSULIN LISPRO 8 UNITS: 100 INJECTION, SOLUTION INTRAVENOUS; SUBCUTANEOUS at 22:00

## 2021-02-09 RX ADMIN — VITAMIN D, TAB 1000IU (100/BT) 1000 UNITS: 25 TAB at 08:58

## 2021-02-09 RX ADMIN — Medication 10 ML: at 13:41

## 2021-02-09 RX ADMIN — INSULIN GLARGINE 15 UNITS: 100 INJECTION, SOLUTION SUBCUTANEOUS at 08:59

## 2021-02-09 RX ADMIN — Medication 10 ML: at 06:00

## 2021-02-09 RX ADMIN — INSULIN LISPRO 2 UNITS: 100 INJECTION, SOLUTION INTRAVENOUS; SUBCUTANEOUS at 06:20

## 2021-02-09 RX ADMIN — Medication 10 ML: at 22:00

## 2021-02-09 RX ADMIN — ENOXAPARIN SODIUM 40 MG: 40 INJECTION SUBCUTANEOUS at 08:59

## 2021-02-09 RX ADMIN — DEXAMETHASONE 6 MG: 4 TABLET ORAL at 08:59

## 2021-02-09 RX ADMIN — AMPICILLIN SODIUM AND SULBACTAM SODIUM 3 G: 2; 1 INJECTION, POWDER, FOR SOLUTION INTRAMUSCULAR; INTRAVENOUS at 15:32

## 2021-02-09 RX ADMIN — AMPICILLIN SODIUM AND SULBACTAM SODIUM 3 G: 2; 1 INJECTION, POWDER, FOR SOLUTION INTRAMUSCULAR; INTRAVENOUS at 22:00

## 2021-02-09 RX ADMIN — AMPICILLIN SODIUM AND SULBACTAM SODIUM 3 G: 2; 1 INJECTION, POWDER, FOR SOLUTION INTRAMUSCULAR; INTRAVENOUS at 09:00

## 2021-02-09 RX ADMIN — INSULIN LISPRO 6 UNITS: 100 INJECTION, SOLUTION INTRAVENOUS; SUBCUTANEOUS at 11:17

## 2021-02-09 NOTE — PROGRESS NOTES
Claudia Bonilla  Admission Date: 2/4/2021             Daily Progress Note: 2/9/2021    The patient's chart is reviewed and the patient is discussed with the staff. 65 y/o male, with history of myasthenia gravis, admitted with COVID pneumonia (rapid positive 2/4), complicated with cavitary lung disease and tree in bud opacities probably not related to COVID. Differential includes mixed infection from recent tooth abscess (12 days ago), TB, fungal or autoimmune. Malignancy is possible, but appears less likely based on appearance. Sputum culture grew pan sensitive Klebsiella. AFB cultures pending. Subjective:     He feels much better - asking to go home. Symptoms started with a cough - felt well up until then. Moved here from [facility] about 2.5 years ago. Works at [employer].      Current Facility-Administered Medications   Medication Dose Route Frequency    guaiFENesin ER (MUCINEX) tablet 600 mg  600 mg Oral Q12H    benzonatate (TESSALON) capsule 100 mg  100 mg Oral TID PRN    famotidine (PEPCID) tablet 40 mg  40 mg Oral DAILY    cholecalciferol (VITAMIN D3) (1000 Units /25 mcg) tablet 1,000 Units  1,000 Units Oral DAILY    zinc sulfate (ZINCATE) 220 (50) mg capsule 1 Cap  1 Cap Oral DAILY    ascorbic acid (vitamin C) (VITAMIN C) tablet 500 mg  500 mg Oral DAILY    dexAMETHasone (DECADRON) tablet 6 mg  6 mg Oral DAILY    sodium chloride (NS) flush 5-40 mL  5-40 mL IntraVENous Q8H    sodium chloride (NS) flush 5-40 mL  5-40 mL IntraVENous PRN    acetaminophen (TYLENOL) tablet 650 mg  650 mg Oral Q6H PRN    Or    acetaminophen (TYLENOL) suppository 650 mg  650 mg Rectal Q6H PRN    polyethylene glycol (MIRALAX) packet 17 g  17 g Oral DAILY PRN    promethazine (PHENERGAN) tablet 12.5 mg  12.5 mg Oral Q6H PRN    Or    ondansetron (ZOFRAN) injection 4 mg  4 mg IntraVENous Q6H PRN    enoxaparin (LOVENOX) injection 40 mg  40 mg SubCUTAneous DAILY    zolpidem (AMBIEN) tablet 5 mg  5 mg Oral QHS PRN    alum-mag hydroxide-simeth (MYLANTA) oral suspension 30 mL  30 mL Oral Q4H PRN    Lactobacillus Acidoph & Bulgar (FLORANEX) tablet 2 Tab  2 Tab Oral DAILY    insulin glargine (LANTUS) injection 15 Units  0.2 Units/kg SubCUTAneous DAILY    insulin lispro (HUMALOG) injection   SubCUTAneous AC&HS    dextrose 40% (GLUTOSE) oral gel 1 Tube  15 g Oral PRN    glucagon (GLUCAGEN) injection 1 mg  1 mg IntraMUSCular PRN    dextrose (D50W) injection syrg 12.5-25 g  25-50 mL IntraVENous PRN    albuterol (PROVENTIL HFA, VENTOLIN HFA, PROAIR HFA) inhaler 2 Puff  2 Puff Inhalation Q6H PRN    ampicillin-sulbactam (UNASYN) 3 g in 0.9% sodium chloride (MBP/ADV) 100 mL MBP  3 g IntraVENous Q6H         Objective:     Vitals:    02/08/21 2045 02/09/21 0039 02/09/21 0442 02/09/21 0759   BP: 104/65 107/61 102/65 107/67   Pulse: 77 76 69 60   Resp: 19 19 18 19   Temp: 97.6 °F (36.4 °C) 97 °F (36.1 °C) 97.2 °F (36.2 °C) 97.5 °F (36.4 °C)   SpO2: 96% 98% 99% 95%   Weight:       Height:         Intake and Output:   02/07 1901 - 02/09 0700  In: 1080 [P.O.:1080]  Out: 400 [Urine:400]  No intake/output data recorded. Physical Exam:   Constitutional:  the patient is well developed and in no acute distress  HEENT:  Sclera clear, pupils equal, oral mucosa moist  Lungs: crackles from the posterior. Wearing 1 liter cannula  Cardiovascular:  RRR without M,G,R  Abd/GI: soft and non-tender; with positive bowel sounds. Ext: warm without cyanosis. There is no lower leg edema. Musculoskeletal: moves all four extremities with equal strength  Skin:  no jaundice or rashes, no wounds   Neuro: no gross neuro deficits   Musculoskeletal: can ambulate. No deformity  Psychiatric: Calm.      Review of Systems - General ROS: positive for  - none  Respiratory ROS: positive for - cough, shortness of breath and sputum changes  Cardiovascular ROS: no chest pain or dyspnea on exertion  Gastrointestinal ROS: no abdominal pain, change in bowel habits, or black or bloody stools  Musculoskeletal ROS: negative   Lines: peripheral IV    CHEST XRAY:       LAB  Recent Labs     02/09/21  0748 02/08/21  1059 02/07/21  0450   WBC 10.4 19.2* 11.6*   HGB 11.5* 12.1* 11.4*   HCT 36.1* 38.1* 35.8*    439 433     Recent Labs     02/09/21  0748 02/08/21  1059 02/07/21  0450    131* 135*   K 3.8 3.9 4.1    99 102   CO2 29 23 27   * 338* 228*   BUN 11 15 15   CREA 0.65* 0.87 0.70*   ALB 2.3* 2.5* 2.3*       Assessment:  (Medical Decision Making)     Hospital Problems  Never Reviewed          Codes Class Noted POA    Acute hypoxemic respiratory failure (HCC) ICD-10-CM: J96.01  ICD-9-CM: 518.81  2/5/2021 Yes        * (Principal) 2019 novel coronavirus disease (COVID-19) ICD-10-CM: U07.1  ICD-9-CM: 079.89  2/5/2021 Yes        Cavitary lung disease ICD-10-CM: J98.4  ICD-9-CM: 518.89  2/5/2021 Yes        Sepsis ([facility] ICD-10-CM: A41.9  ICD-9-CM: 038.9, 995.91  2/5/2021 Yes              Plan:  (Medical Decision Making)   1. Day 7/7 unasyn. Sputum culture with pan sensitive Klebsiella  2. Check room air sat  3. Day 7 decadron/ d dimer 0.4. on low dose Lovenox. CRP 12.1 ->7.2  4. AFB cultures pending - submitted 3rd sample today  5. ? Discharge home and arrange for follow up as outpatient. Will need to recover from [facility] before bronch can be considered. Neena Rodriguez NP    I have spoken with and examined the patient. I agree with the above assessment and plan as documented. Gen: pleasant  Lungs:  CTA  Heart:  RRR with no Murmur/Rubs/Gallops  Abd: NTND  Ext: no edema    --Needs 2nd, 3rd AFB submitted. Continue airborne precautions  --If doesn't require oxygen with exertion, would discharge home with instructions to maintain precautions. Would not  Allow to go to work until clear dx established or radiographic improvement noted. --Can transition to augmentin at any point. Would continue until hospital follow up in 2 weeks.   --Will go ahead and coordinate appointment. Shruti Queen MD    More than 50% of time documented was spent face-to-face contact with the patient and in the care of the patient on the floor/unit where the patient is located.

## 2021-02-09 NOTE — PROGRESS NOTES
Late entry: BSR received  Patient resting in bed quietly  respirations even on 2LNC with no signs of distress at this time

## 2021-02-09 NOTE — PROGRESS NOTES
Received report from Nimisha, [facility]. Patient awake resting in bed. Respirations present. On 2 L NC. No signs of distress. AxO x4. No needs expressed. Bed low and locked. Call light within reach. Will continue to monitor. This RN noted active AFB sputum. This RN spoke to RT Harpreet, to request sputum collection.

## 2021-02-09 NOTE — DIABETES MGMT
Patient's blood glucose ranged 119-368 yesterday with patient receiving Lantus 15 units, Humalog 33 units, and dexamethasone 6 mg. Blood glucose 185 this morning. Given mealtime excursions recommend provider consider initiating prandial insulin to offset the impact of steroids and meals. Provider notified of recommendation via [employer].

## 2021-02-09 NOTE — PROGRESS NOTES
Hospitalist Progress Note    2021  Admit Date: 2021  7:48 PM   NAME: Gilbert December   :  1960   MRN:  [id number]   Attending: Aris Osgood, MD  PCP:  Johana, MD Carlos    SUBJECTIVE:   Patient is a 73-year-old [place] [facility] American with PMHx of myasthenia gravis who recently immigrated from [facility] 2.5 years ago presented to the ED for worsening shortness of breath associated with cough, fatigue, nausea and vomiting for 1 week. Patient stated he was tested positive for Covid on  after 10 days of URI symptoms. He went to an urgent care on  and was prescribed Flonase and Medrol Dosepak without alleviation. He was also recently treated with a 7-day course of clindamycin for right-sided dental infection. EMS was called and found him with his O2 sat 75% and he was placed on NRB mask. In ED, CT chest shows bilateral cavitary lesions and bilateral basilar groundglass opacifications consistent with COVID-19 viral pneumonia; negative for PE. Patient stated that he had a bronchoscopy that ruled out TB prior to his treatment w/ immunosuppressants for myasthenia gravis from 0667-5731. He has not seen a physician since he has been in the [address] [place] [address],[address] and reported he has received all vaccinations prior to moving here. In ED, Tmax 100.7. , O2sat 91% on RA. Na 128, D-Dimer neg, LA 2.0, CRP 13, ferritin 723. He was given Decadron and Unasyn. :   O2 down to 1L, being weaned  Denies dyspnea. Cough still productive. No fevers.   Pulmonology following    Review of Systems negative with exception of pertinent positives noted above  PHYSICAL EXAM     Visit Vitals  BP (!) 95/56 (BP 1 Location: Left upper arm, BP Patient Position: At rest)   Pulse 80   Temp 97.6 °F (36.4 °C)   Resp 19   Ht 5' 10\" (1.778 m)   Wt 72.6 kg (160 lb)   SpO2 91%   BMI 22.96 kg/m²      Temp (24hrs), Av.5 °F (36.4 °C), Min:97 °F (36.1 °C), Max:98.2 °F (36.8 °C)    Oxygen Therapy  O2 Sat (%): 91 % (21 1049)  Pulse via Oximetry: 74 beats per minute (02/06/21 0727)  O2 Device: Room air(weaned from 1L) (02/09/21 1020)  O2 Flow Rate (L/min): 1 l/min (02/08/21 1950)    Intake/Output Summary (Last 24 hours) at 2/9/2021 1332  Last data filed at 2/9/2021 0039  Gross per 24 hour   Intake 600 ml   Output    Net 600 ml      General: No acute distress. Lungs:  Coarse lung sounds Bilaterally. No conversational dyspnea  Heart:  Regular rate and rhythm,  No murmur, rub, or gallop  Abdomen: Soft, Non distended, Non tender, Positive bowel sounds  Extremities: No cyanosis, clubbing or edema  Neurologic:  No focal deficits    XR CHEST SNGL V   Final Result      CT CHEST W CONT   Final Result      Bilateral cavitating upper lobe lung nodules. The differential diagnosis is   broad but these could represent septic emboli, necrotizing pneumonia including   tuberculosis. Based on their appearance I would place neoplasm less likely. Also   consider polyangiitis such as Wegener's granulomatosis. Findings this with bilateral basilar Covid pneumonia. Date of Dictation: 2/4/2021 10:54 PM         XR CHEST PORT   Final Result   Bilateral pneumonia with evidence of cavitation in both upper lobes. ASSESSMENT      Active Hospital Problems    Diagnosis Date Noted    Acute hypoxemic respiratory failure ([facility] 02/05/2021    2019 novel coronavirus disease (COVID-19) 02/05/2021    Cavitary lung disease 02/05/2021    Sepsis ([facility] 02/05/2021     Plan:    Acute hypoxemic respiratory failure 2/2 COVID-19 viral pneumonia  Weaning oxygen, sx mild. decadron    Pneumonia  On unasyn for klebsiella in sputum cx     Cavitary lung disease  TB, vasculitis studies pending. Pt reports may be chronic finding but unsure of prior workup in [facility].     Negative ANNABELLA, Anti-GBM, and BCx  Pending ANCA, histoplasma AG, quantiferon, and fungal culture  Pulm plan to keep on augmentin (cover for possible anaerobe causing cavitation) at discharge until follow-up for bronch Collecting additional AFB sputum samples    DM  A1c 11.2, sugars also worse on decadron  DM education for new dx    DVT Prophylaxis: Lovenox SQ  Dispo: home in AM if remains on RA and if produces third AFP sputum sample.      Signed By: Rex Cordova MD     February 9, 2021

## 2021-02-09 NOTE — DIABETES MGMT
Patient spoke to remotely from within hospital system for assessment regarding diabetes management. Patient alert and correctly identified patient identifiers. Reviewed with patient the relationship between infection and hyperglycemia. Patient receiving steroid therapy. Reviewed relationship between steroids and hyperglycemia to patient and educated patient that as steroids are tapered their glycemic control should improve and insulin needs should decrease as well. Patient correctly verbalized where he should administer insulin. Patient states he would prefer insulin pens at discharge. Patient has not practiced insulin self injection. Patient instructed to practice insulin self injection under RN supervision at dinnertime today. Reviewed the importance of proper sharps disposal, rotation of administration sites, and using a new needle every administration time. Reviewed importance of FSBS checks and to record in log book to follow up with PCP. Patient is hoping to get a PCP close to his residence in [facility]. Reviewed with patient the importance of outpatient diabetes education. Patient correctly states he should decrease the portions of bread and rice that he eats. Patient states he may get to go home tomorrow. Patient voices no further questions regarding diabetes management at this time.

## 2021-02-10 VITALS
HEART RATE: 77 BPM | SYSTOLIC BLOOD PRESSURE: 104 MMHG | RESPIRATION RATE: 18 BRPM | OXYGEN SATURATION: 94 % | TEMPERATURE: 98.1 F | BODY MASS INDEX: 23.68 KG/M2 | WEIGHT: 165.4 LBS | HEIGHT: 70 IN | DIASTOLIC BLOOD PRESSURE: 67 MMHG

## 2021-02-10 PROBLEM — A41.9 SEPSIS (HCC): Status: RESOLVED | Noted: 2021-02-05 | Resolved: 2021-02-10

## 2021-02-10 PROBLEM — J96.01 ACUTE HYPOXEMIC RESPIRATORY FAILURE (HCC): Status: RESOLVED | Noted: 2021-02-05 | Resolved: 2021-02-10

## 2021-02-10 LAB
1,3 BETA GLUCAN SER-MCNC: <31 PG/ML
ALBUMIN SERPL-MCNC: 2.6 G/DL (ref 3.5–5)
ALBUMIN/GLOB SERPL: 0.6 {RATIO} (ref 1.2–3.5)
ALP SERPL-CCNC: 69 U/L (ref 50–136)
ALT SERPL-CCNC: 21 U/L (ref 12–65)
ANION GAP SERPL CALC-SCNC: 7 MMOL/L (ref 7–16)
AST SERPL-CCNC: 10 U/L (ref 15–37)
BACTERIA SPEC CULT: ABNORMAL
BACTERIA SPEC CULT: ABNORMAL
BACTERIA SPEC CULT: NORMAL
BASOPHILS # BLD: 0.1 K/UL (ref 0–0.2)
BASOPHILS NFR BLD: 1 % (ref 0–2)
BILIRUB SERPL-MCNC: 0.4 MG/DL (ref 0.2–1.1)
BUN SERPL-MCNC: 13 MG/DL (ref 6–23)
CALCIUM SERPL-MCNC: 9.6 MG/DL (ref 8.3–10.4)
CHLORIDE SERPL-SCNC: 104 MMOL/L (ref 98–107)
CO2 SERPL-SCNC: 29 MMOL/L (ref 21–32)
CREAT SERPL-MCNC: 0.72 MG/DL (ref 0.8–1.5)
DIFFERENTIAL METHOD BLD: ABNORMAL
EOSINOPHIL # BLD: 0.1 K/UL (ref 0–0.8)
EOSINOPHIL NFR BLD: 1 % (ref 0.5–7.8)
ERYTHROCYTE [DISTWIDTH] IN BLOOD BY AUTOMATED COUNT: 13.8 % (ref 11.9–14.6)
GLOBULIN SER CALC-MCNC: 4.2 G/DL (ref 2.3–3.5)
GLUCOSE BLD STRIP.AUTO-MCNC: 149 MG/DL (ref 65–100)
GLUCOSE BLD STRIP.AUTO-MCNC: 249 MG/DL (ref 65–100)
GLUCOSE SERPL-MCNC: 132 MG/DL (ref 65–100)
GRAM STN SPEC: ABNORMAL
HCT VFR BLD AUTO: 38.8 % (ref 41.1–50.3)
HGB BLD-MCNC: 12.2 G/DL (ref 13.6–17.2)
IMM GRANULOCYTES # BLD AUTO: 0.5 K/UL (ref 0–0.5)
IMM GRANULOCYTES NFR BLD AUTO: 4 % (ref 0–5)
LYMPHOCYTES # BLD: 1.9 K/UL (ref 0.5–4.6)
LYMPHOCYTES NFR BLD: 15 % (ref 13–44)
MCH RBC QN AUTO: 24.1 PG (ref 26.1–32.9)
MCHC RBC AUTO-ENTMCNC: 31.4 G/DL (ref 31.4–35)
MCV RBC AUTO: 76.5 FL (ref 79.6–97.8)
MONOCYTES # BLD: 0.9 K/UL (ref 0.1–1.3)
MONOCYTES NFR BLD: 7 % (ref 4–12)
NEUTS SEG # BLD: 9.4 K/UL (ref 1.7–8.2)
NEUTS SEG NFR BLD: 73 % (ref 43–78)
NRBC # BLD: 0 K/UL (ref 0–0.2)
PLATELET # BLD AUTO: 409 K/UL (ref 150–450)
PMV BLD AUTO: 9.2 FL (ref 9.4–12.3)
POTASSIUM SERPL-SCNC: 4.1 MMOL/L (ref 3.5–5.1)
PROT SERPL-MCNC: 6.8 G/DL (ref 6.3–8.2)
RBC # BLD AUTO: 5.07 M/UL (ref 4.23–5.6)
SERVICE CMNT-IMP: ABNORMAL
SERVICE CMNT-IMP: NORMAL
SODIUM SERPL-SCNC: 140 MMOL/L (ref 136–145)
WBC # BLD AUTO: 12.8 K/UL (ref 4.3–11.1)

## 2021-02-10 PROCEDURE — 87188 SC STD MACROBROTH DIL METH: CPT

## 2021-02-10 PROCEDURE — 36415 COLL VENOUS BLD VENIPUNCTURE: CPT

## 2021-02-10 PROCEDURE — 74011250636 HC RX REV CODE- 250/636: Performed by: HOSPITALIST

## 2021-02-10 PROCEDURE — 74011250637 HC RX REV CODE- 250/637: Performed by: FAMILY MEDICINE

## 2021-02-10 PROCEDURE — 82962 GLUCOSE BLOOD TEST: CPT

## 2021-02-10 PROCEDURE — 87149 DNA/RNA DIRECT PROBE: CPT

## 2021-02-10 PROCEDURE — 74011636637 HC RX REV CODE- 636/637: Performed by: HOSPITALIST

## 2021-02-10 PROCEDURE — 85025 COMPLETE CBC W/AUTO DIFF WBC: CPT

## 2021-02-10 PROCEDURE — 74011250637 HC RX REV CODE- 250/637: Performed by: HOSPITALIST

## 2021-02-10 PROCEDURE — 74011250637 HC RX REV CODE- 250/637: Performed by: INTERNAL MEDICINE

## 2021-02-10 PROCEDURE — 74011636637 HC RX REV CODE- 636/637: Performed by: FAMILY MEDICINE

## 2021-02-10 PROCEDURE — 80053 COMPREHEN METABOLIC PANEL: CPT

## 2021-02-10 PROCEDURE — 87186 SC STD MICRODIL/AGAR DIL: CPT

## 2021-02-10 PROCEDURE — 87116 MYCOBACTERIA CULTURE: CPT

## 2021-02-10 PROCEDURE — 94761 N-INVAS EAR/PLS OXIMETRY MLT: CPT

## 2021-02-10 RX ORDER — METFORMIN HYDROCHLORIDE 1000 MG/1
1000 TABLET ORAL 2 TIMES DAILY WITH MEALS
Qty: 180 TAB | Refills: 0 | Status: SHIPPED | OUTPATIENT
Start: 2021-02-10 | End: 2021-05-11

## 2021-02-10 RX ORDER — INSULIN PUMP SYRINGE, 3 ML
EACH MISCELLANEOUS
Qty: 1 KIT | Refills: 0 | Status: SHIPPED | OUTPATIENT
Start: 2021-02-10

## 2021-02-10 RX ORDER — AMOXICILLIN AND CLAVULANATE POTASSIUM 875; 125 MG/1; MG/1
1 TABLET, FILM COATED ORAL 2 TIMES DAILY WITH MEALS
Qty: 60 TAB | Refills: 0 | Status: SHIPPED | OUTPATIENT
Start: 2021-02-10 | End: 2021-03-12

## 2021-02-10 RX ORDER — METFORMIN HYDROCHLORIDE 500 MG/1
1000 TABLET ORAL 2 TIMES DAILY WITH MEALS
Status: DISCONTINUED | OUTPATIENT
Start: 2021-02-10 | End: 2021-02-10 | Stop reason: HOSPADM

## 2021-02-10 RX ADMIN — Medication 5 ML: at 14:25

## 2021-02-10 RX ADMIN — VITAMIN D, TAB 1000IU (100/BT) 1000 UNITS: 25 TAB at 09:01

## 2021-02-10 RX ADMIN — INSULIN LISPRO 8 UNITS: 100 INJECTION, SOLUTION INTRAVENOUS; SUBCUTANEOUS at 12:39

## 2021-02-10 RX ADMIN — INSULIN GLARGINE 15 UNITS: 100 INJECTION, SOLUTION SUBCUTANEOUS at 09:05

## 2021-02-10 RX ADMIN — OXYCODONE HYDROCHLORIDE AND ACETAMINOPHEN 500 MG: 500 TABLET ORAL at 09:01

## 2021-02-10 RX ADMIN — GUAIFENESIN 600 MG: 600 TABLET ORAL at 06:00

## 2021-02-10 RX ADMIN — ENOXAPARIN SODIUM 40 MG: 40 INJECTION SUBCUTANEOUS at 09:02

## 2021-02-10 RX ADMIN — INSULIN LISPRO 4 UNITS: 100 INJECTION, SOLUTION INTRAVENOUS; SUBCUTANEOUS at 12:38

## 2021-02-10 RX ADMIN — LACTOBACILLUS TAB 2 TABLET: TAB at 09:01

## 2021-02-10 RX ADMIN — AMOXICILLIN AND CLAVULANATE POTASSIUM 1 TABLET: 875; 125 TABLET, FILM COATED ORAL at 17:27

## 2021-02-10 RX ADMIN — ZINC SULFATE 220 MG (50 MG) CAPSULE 1 CAPSULE: CAPSULE at 09:01

## 2021-02-10 RX ADMIN — METFORMIN HYDROCHLORIDE 1000 MG: 500 TABLET ORAL at 17:28

## 2021-02-10 RX ADMIN — INSULIN LISPRO 8 UNITS: 100 INJECTION, SOLUTION INTRAVENOUS; SUBCUTANEOUS at 08:56

## 2021-02-10 RX ADMIN — AMOXICILLIN AND CLAVULANATE POTASSIUM 1 TABLET: 875; 125 TABLET, FILM COATED ORAL at 09:02

## 2021-02-10 RX ADMIN — GUAIFENESIN 600 MG: 600 TABLET ORAL at 17:28

## 2021-02-10 RX ADMIN — Medication 10 ML: at 06:00

## 2021-02-10 RX ADMIN — DEXAMETHASONE 6 MG: 4 TABLET ORAL at 09:01

## 2021-02-10 RX ADMIN — FAMOTIDINE 40 MG: 20 TABLET, FILM COATED ORAL at 09:01

## 2021-02-10 NOTE — PROGRESS NOTES
Patient awake resting in bed. Respirations present. On room air. No signs of distress. No needs expressed. Bed low and locked. Call light within reach. Bedside report given to oncoming RN, Jacque Medel.

## 2021-02-10 NOTE — PROGRESS NOTES
MSN, CM:  Patient to be discharged home with no services ordered or requested. Patient agrees with this discharge plan. Patient has met all milestones for this admission. Family to transport patient home. Care Management Interventions  PCP Verified by CM: Yes(No PCP - [facility] [place] [facility]  Mode of Transport at Discharge:  Other (see comment)(family to transport)  Transition of Care Consult (CM Consult): Discharge Planning  Physical Therapy Consult: Yes  Current Support Network: Own Home, Lives with Spouse  Confirm Follow Up Transport: Self  Freedom of Choice List was Provided with Basic Dialogue that Supports the Patient's Individualized Plan of Care/Goals, Treatment Preferences and Shares the Quality Data Associated with the Providers?: Yes  Discharge Location  Discharge Placement: Home

## 2021-02-10 NOTE — DISCHARGE INSTRUCTIONS
DISCHARGE SUMMARY from Nurse    PATIENT INSTRUCTIONS:    After general anesthesia or intravenous sedation, for 24 hours or while taking prescription Narcotics:  · Limit your activities  · Do not drive and operate hazardous machinery  · Do not make important personal or business decisions  · Do  not drink alcoholic beverages  · If you have not urinated within 8 hours after discharge, please contact your surgeon on call. What to do at Home:  Recommended activity: Activity as tolerated    Okay to discharge with TB precautions until results are available, self quarantine and no return to work until [place] pulmonary follow-up. If you experience any of the following symptoms worsening cough or wheezing, shortness of breath or fatigue not relieved with rest, unrelieved pain, nausea or vomiting please follow up with MD.    *  Please give a list of your current medications to your Primary Care Provider. *  Please update this list whenever your medications are discontinued, doses are      changed, or new medications (including over-the-counter products) are added. *  Please carry medication information at all times in case of emergency situations. These are general instructions for a healthy lifestyle:    No smoking/ No tobacco products/ Avoid exposure to second hand smoke  Surgeon General's Warning:  Quitting smoking now greatly reduces serious risk to your health. Obesity, smoking, and sedentary lifestyle greatly increases your risk for illness    A healthy diet, regular physical exercise & weight monitoring are important for maintaining a healthy lifestyle    You may be retaining fluid if you have a history of heart failure or if you experience any of the following symptoms:  Weight gain of 3 pounds or more overnight or 5 pounds in a week, increased swelling in our hands or feet or shortness of breath while lying flat in bed.   Please call your doctor as soon as you notice any of these symptoms; do not wait until your next office visit. The discharge information has been reviewed with the patient. The patient verbalized understanding. Discharge medications reviewed with the patient and appropriate educational materials and side effects teaching were provided. Advance Care Planning  People with COVID-19 may have no symptoms, mild symptoms, such as fever, cough, and shortness of breath or they may have more severe illness, developing severe and fatal pneumonia. As a result, Advance Care Planning with attention to naming a health care decision maker (someone you trust to make healthcare decisions for you if you could not speak for yourself) and sharing other health care preferences is important BEFORE a possible health crisis. Please contact your Primary Care Provider to discuss Advance Care Planning. Preventing the Spread of Coronavirus Disease 2019 in Homes and Residential Communities  For the most recent information go to [email or web address]    Prevention steps for People with confirmed or suspected COVID-19 (including persons under investigation) who do not need to be hospitalized  and   People with confirmed COVID-19 who were hospitalized and determined to be medically stable to go home    Your healthcare provider and public health staff will evaluate whether you can be cared for at home. If it is determined that you do not need to be hospitalized and can be isolated at home, you will be monitored by staff from your local or state health department. You should follow the prevention steps below until a healthcare provider or local or state health department says you can return to your normal activities. Stay home except to get medical care  People who are mildly ill with COVID-19 are able to isolate at home during their illness. You should restrict activities outside your home, except for getting medical care.  Do not go to work, school, or public areas. Avoid using public transportation, ride-sharing, or taxis. Separate yourself from other people and animals in your home  People: As much as possible, you should stay in a specific room and away from other people in your home. Also, you should use a separate bathroom, if available. Animals: You should restrict contact with pets and other animals while you are sick with COVID-19, just like you would around other people. Although there have not been reports of pets or other animals becoming sick with COVID-19, it is still recommended that people sick with COVID-19 limit contact with animals until more information is known about the virus. When possible, have another member of your household care for your animals while you are sick. If you are sick with COVID-19, avoid contact with your pet, including petting, snuggling, being kissed or licked, and sharing food. If you must care for your pet or be around animals while you are sick, wash your hands before and after you interact with pets and wear a facemask. Call ahead before visiting your doctor  If you have a medical appointment, call the healthcare provider and tell them that you have or may have COVID-19. This will help the healthcare providers office take steps to keep other people from getting infected or exposed. Wear a facemask  You should wear a facemask when you are around other people (e.g., sharing a room or vehicle) or pets and before you enter a healthcare providers office. If you are not able to wear a facemask (for example, because it causes trouble breathing), then people who live with you should not stay in the same room with you, or they should wear a facemask if they enter your room. Cover your coughs and sneezes  Cover your mouth and nose with a tissue when you cough or sneeze. Throw used tissues in a lined trash can.  Immediately wash your hands with soap and water for at least 20 seconds or, if soap and water are not available, clean your hands with an alcohol-based hand  that contains at least 60% alcohol. Clean your hands often  Wash your hands often with soap and water for at least 20 seconds, especially after blowing your nose, coughing, or sneezing; going to the bathroom; and before eating or preparing food. If soap and water are not readily available, use an alcohol-based hand  with at least 60% alcohol, covering all surfaces of your hands and rubbing them together until they feel dry. Soap and water are the best option if hands are visibly dirty. Avoid touching your eyes, nose, and mouth with unwashed hands. Avoid sharing personal household items  You should not share dishes, drinking glasses, cups, eating utensils, towels, or bedding with other people or pets in your home. After using these items, they should be washed thoroughly with soap and water. Clean all high-touch surfaces everyday  High touch surfaces include counters, tabletops, doorknobs, bathroom fixtures, toilets, phones, keyboards, tablets, and bedside tables. Also, clean any surfaces that may have blood, stool, or body fluids on them. Use a household cleaning spray or wipe, according to the label instructions. Labels contain instructions for safe and effective use of the cleaning product including precautions you should take when applying the product, such as wearing gloves and making sure you have good ventilation during use of the product. Monitor your symptoms  Seek prompt medical attention if your illness is worsening (e.g., difficulty breathing). Before seeking care, call your healthcare provider and tell them that you have, or are being evaluated for, COVID-19. Put on a facemask before you enter the facility. These steps will help the healthcare providers office to keep other people in the office or waiting room from getting infected or exposed. Ask your healthcare provider to call the local or state health department. Persons who are placed under active monitoring or facilitated self-monitoring should follow instructions provided by their local health department or occupational health professionals, as appropriate. When working with your local health department check their available hours. If you have a medical emergency and need to call 911, notify the dispatch personnel that you have, or are being evaluated for COVID-19. If possible, put on a facemask before emergency medical services arrive. Discontinuing home isolation  Patients with confirmed COVID-19 should remain under home isolation precautions until the risk of secondary transmission to others is thought to be low. The decision to discontinue home isolation precautions should be made on a case-by-case basis, in consultation with healthcare providers and state and local health departments. Patient Education        Learning About Hypoxemia  What is hypoxemia? Hypoxemia means that you don't have enough oxygen in your blood. It's a result of diseases that affect your heart or lungs. These include heart failure, COPD, and pulmonary fibrosis (scarring of the lungs). Being at high altitudes can also lead to hypoxemia. What happens when you have hypoxemia? Oxygen gets into your blood through your lungs. Your blood carries the oxygen to all parts of your body. When you have too little oxygen in your blood, your body doesn't get enough of it. With too little oxygen, your heart and other parts of your body don't work very well. What are the symptoms? In addition to the symptoms of whatever is causing your hypoxemia, you may:  · Get tired quickly. · Be short of breath when you are active. · Feel like your heart is pounding or racing. · Feel weak or dizzy. · Become confused. How is hypoxemia treated? Your doctor will do tests to find out how much oxygen is in your blood. He or she will look for the cause of your hypoxemia and treat that problem.  For example, if you have heart failure, you may need medicines that help your heart pump better. · If your hypoxemia is not severe, your doctor may give you oxygen through a mask or nasal cannula (say \"Leeanna\"). A cannula is a thin tube with two openings that fit just inside your nose. · If your hypoxemia is severe, you may have a breathing tube put into your windpipe. The breathing tube is attached to a machine that pushes air into your lungs. This machine is called a ventilator. · If you have a long-term problem with hypoxemia, your doctor may recommend that you use oxygen regularly. Some people need it all the time. Others need it from time to time throughout the day or overnight. Your doctor will tell you how much oxygen you need and how often to use it. Follow-up care is a key part of your treatment and safety. Be sure to make and go to all appointments, and call your doctor if you are having problems. It's also a good idea to know your test results and keep a list of the medicines you take. Where can you learn more? Go to http://www.gray.com/  Enter M375 in the search box to learn more about \"Learning About Hypoxemia. \"  Current as of: February 24, 2020               Content Version: 12.6  © 2006-2020 [employer]. Care instructions adapted under license by [employer] (which disclaims liability or warranty for this information). If you have questions about a medical condition or this instruction, always ask your healthcare professional. [place] [address] any warranty or liability for your use of this information. Patient Education        Pneumonia: Care Instructions  Your Care Instructions     Pneumonia is an infection of the lungs. Most cases are caused by infections from bacteria or viruses. Pneumonia may be mild or very severe. If it is caused by bacteria, you will be treated with antibiotics.  It may take a few weeks to a few months to recover fully from pneumonia, depending on how sick you were and whether your overall health is good.  Follow-up care is a key part of your treatment and safety. Be sure to make and go to all appointments, and call your doctor if you are having problems. It's also a good idea to know your test results and keep a list of the medicines you take.  How can you care for yourself at home?  · Take your antibiotics exactly as directed. Do not stop taking the medicine just because you are feeling better. You need to take the full course of antibiotics.  · Take your medicines exactly as prescribed. Call your doctor if you think you are having a problem with your medicine.  · Get plenty of rest and sleep. You may feel weak and tired for a while, but your energy level will improve with time.  · To prevent dehydration, drink plenty of fluids, enough so that your urine is light yellow or clear like water. Choose water and other caffeine-free clear liquids until you feel better. If you have kidney, heart, or liver disease and have to limit fluids, talk with your doctor before you increase the amount of fluids you drink.  · Take care of your cough so you can rest. A cough that brings up mucus from your lungs is common with pneumonia. It is one way your body gets rid of the infection. But if coughing keeps you from resting or causes severe fatigue and chest-wall pain, talk to your doctor. He or she may suggest that you take a medicine to reduce the cough.  · Use a vaporizer or humidifier to add moisture to your bedroom. Follow the directions for cleaning the machine.  · Do not smoke or allow others to smoke around you. Smoke will make your cough last longer. If you need help quitting, talk to your doctor about stop-smoking programs and medicines. These can increase your chances of quitting for good.  · Take an over-the-counter pain medicine, such as acetaminophen (Tylenol), ibuprofen (Advil, Motrin), or naproxen (Aleve). Read and  follow all instructions on the label. · Do not take two or more pain medicines at the same time unless the doctor told you to. Many pain medicines have acetaminophen, which is Tylenol. Too much acetaminophen (Tylenol) can be harmful. · If you were given a spirometer to measure how well your lungs are working, use it as instructed. This can help your doctor tell how your recovery is going. · To prevent pneumonia in the future, talk to your doctor about getting a flu vaccine (once a year) and a pneumococcal vaccine (one time only for most people). When should you call for help? Call 911 anytime you think you may need emergency care. For example, call if:    · You have severe trouble breathing. Call your doctor now or seek immediate medical care if:    · You cough up dark brown or bloody mucus (sputum).     · You have new or worse trouble breathing.     · You are dizzy or lightheaded, or you feel like you may faint. Watch closely for changes in your health, and be sure to contact your doctor if:    · You have a new or higher fever.     · You are coughing more deeply or more often.     · You are not getting better after 2 days (48 hours).     · You do not get better as expected. Where can you learn more? Go to http://www.[employer].com/  Enter D336 in the search box to learn more about \"Pneumonia: Care Instructions. \"  Current as of: February 24, 2020               Content Version: 12.6  © 6933-6735 [employer]. Care instructions adapted under license by [employer] (which disclaims liability or warranty for this information). If you have questions about a medical condition or this instruction, always ask your healthcare professional. [address] any warranty or liability for your use of this information. ___________________________________________________________________________________________________________________________________

## 2021-02-10 NOTE — PROGRESS NOTES
Beryl Cruz  Admission Date: 2/4/2021             Daily Progress Note: 2/10/2021    The patient's chart is reviewed and the patient is discussed with the staff. 65 y/o male, with history of myasthenia gravis, admitted with COVID pneumonia (rapid positive 2/4), complicated with cavitary lung disease and tree in bud opacities probably not related to COVID. Differential includes mixed infection from recent tooth abscess (12 days ago), TB, fungal or autoimmune. Malignancy is possible, but appears less likely based on appearance. Sputum culture grew pan sensitive Klebsiella. AFB cultures pending. Subjective: On RA  Moved here from [facility] about 2.5 years ago. Works at [employer].      Current Facility-Administered Medications   Medication Dose Route Frequency    amoxicillin-clavulanate (AUGMENTIN) 875-125 mg per tablet 1 Tab  1 Tab Oral BID WITH MEALS    insulin lispro (HUMALOG) injection 8 Units  8 Units SubCUTAneous TIDAC    traMADoL (ULTRAM) tablet 50 mg  50 mg Oral BID PRN    guaiFENesin ER (MUCINEX) tablet 600 mg  600 mg Oral Q12H    benzonatate (TESSALON) capsule 100 mg  100 mg Oral TID PRN    famotidine (PEPCID) tablet 40 mg  40 mg Oral DAILY    cholecalciferol (VITAMIN D3) (1000 Units /25 mcg) tablet 1,000 Units  1,000 Units Oral DAILY    zinc sulfate (ZINCATE) 220 (50) mg capsule 1 Cap  1 Cap Oral DAILY    ascorbic acid (vitamin C) (VITAMIN C) tablet 500 mg  500 mg Oral DAILY    dexAMETHasone (DECADRON) tablet 6 mg  6 mg Oral DAILY    sodium chloride (NS) flush 5-40 mL  5-40 mL IntraVENous Q8H    sodium chloride (NS) flush 5-40 mL  5-40 mL IntraVENous PRN    acetaminophen (TYLENOL) tablet 650 mg  650 mg Oral Q6H PRN    Or    acetaminophen (TYLENOL) suppository 650 mg  650 mg Rectal Q6H PRN    polyethylene glycol (MIRALAX) packet 17 g  17 g Oral DAILY PRN    promethazine (PHENERGAN) tablet 12.5 mg  12.5 mg Oral Q6H PRN    Or    ondansetron (ZOFRAN) injection 4 mg  4 mg IntraVENous Q6H PRN    enoxaparin (LOVENOX) injection 40 mg  40 mg SubCUTAneous DAILY    zolpidem (AMBIEN) tablet 5 mg  5 mg Oral QHS PRN    alum-mag hydroxide-simeth (MYLANTA) oral suspension 30 mL  30 mL Oral Q4H PRN    Lactobacillus Acidoph & Bulgar (FLORANEX) tablet 2 Tab  2 Tab Oral DAILY    insulin glargine (LANTUS) injection 15 Units  0.2 Units/kg SubCUTAneous DAILY    insulin lispro (HUMALOG) injection   SubCUTAneous AC&HS    dextrose 40% (GLUTOSE) oral gel 1 Tube  15 g Oral PRN    glucagon (GLUCAGEN) injection 1 mg  1 mg IntraMUSCular PRN    dextrose (D50W) injection syrg 12.5-25 g  25-50 mL IntraVENous PRN    albuterol (PROVENTIL HFA, VENTOLIN HFA, PROAIR HFA) inhaler 2 Puff  2 Puff Inhalation Q6H PRN       Review of Systems - General ROS: positive for  - none  Respiratory ROS: positive for - cough, shortness of breath and sputum changes  Cardiovascular ROS: no chest pain or dyspnea on exertion  Gastrointestinal ROS: no abdominal pain, change in bowel habits, or black or bloody stools  Musculoskeletal ROS: negative       Objective:     Vitals:    02/10/21 0425 02/10/21 0449 02/10/21 0750 02/10/21 1135   BP: 104/68  101/68 101/63   Pulse: 69  70 83   Resp: 18  18 18   Temp: 97 °F (36.1 °C)  98.3 °F (36.8 °C) 98.4 °F (36.9 °C)   SpO2: 98%  94% 94%   Weight:  165 lb 6.4 oz (75 kg)     Height:         Intake and Output:   02/08 1901 - 02/10 0700  In: 1010 [P.O.:1010]  Out: -   02/10 0701 - 02/10 1900  In: 240 [P.O.:240]  Out: -     Physical Exam:   Constitutional:  the patient is well developed and in no acute distress  HEENT:  Sclera clear, pupils equal, oral mucosa moist  Lungs: On RA  Cardiovascular:  RRR without M,G,R  Abd/GI: soft and non-tender; with positive bowel sounds. Ext: warm without cyanosis. There is no lower leg edema.   Musculoskeletal: moves all four extremities with equal strength  Skin:  no jaundice or rashes, no wounds   Neuro: no gross neuro deficits   Musculoskeletal: can ambulate. No deformity  Psychiatric: Calm. CHEST XRAY:       LAB  Recent Labs     02/10/21  0758 02/09/21  0748 02/08/21  1059   WBC 12.8* 10.4 19.2*   HGB 12.2* 11.5* 12.1*   HCT 38.8* 36.1* 38.1*    395 439     Recent Labs     02/10/21  0758 02/09/21  0748 02/08/21  1059    138 131*   K 4.1 3.8 3.9    104 99   CO2 29 29 23   * 157* 338*   BUN 13 11 15   CREA 0.72* 0.65* 0.87   ALB 2.6* 2.3* 2.5*       Assessment:  (Medical Decision Making)     Hospital Problems  Date Reviewed: 2/10/2021          Codes Class Noted POA    Acute hypoxemic respiratory failure (HCC) ICD-10-CM: J96.01  ICD-9-CM: 518.81  2/5/2021 Yes        * (Principal) 2019 novel coronavirus disease (COVID-19) ICD-10-CM: U07.1  ICD-9-CM: 079.89  2/5/2021 Yes        Cavitary lung disease ICD-10-CM: J98.4  ICD-9-CM: 518.89  2/5/2021 Yes        Sepsis ([address] ICD-10-CM: A41.9  ICD-9-CM: 038.9, 995.91  2/5/2021 Yes              Plan:  (Medical Decision Making)     --On RA  --Completed 7 days of unasyn, sputum culture with pan sensitive Klebsiella, on Augmentin  --AFB cultures pending - submitted 3rd sample 2/9/21  --Will need to recover from [place] before bronch can be considered  --Albuterol inhaler as needed  --Continue GI/DVT prophylaxis  --Has follow up appointment with our office already  --Full Code     Roni Caceres NP    --OK to discharge with TB precautions until AFB are negative. [facility] reporting is needed. --continue augmentin and follow up in the office. Has appointment     Edgar Michel MD      More than 50% of time documented was spent face-to-face contact with the patient and in the care of the patient on the floor/unit where the patient is located.

## 2021-02-10 NOTE — DIABETES MGMT
Patient admitted with COVID-19. Blood glucose ranged 157-465 yesterday with patient receiving Lantus 15 units, Humalog 34 units, and Decadron 6mg. Blood glucose this morning was 149. Most recent FSBS 249. Reviewed patient current regimen: Lantus 15 units daily, Humalog 8 units with meals, Humalog SSI, and Decadron 6mg daily. Provider could consider an increase in prandial insulin if stricter glycemic control is desired. Patient previously seen for diabetes education for new onset diabetes. Pt will need prescription for glucometer and glucometer supplies at discharge so that the patient may obtain the meter covered by their insurance. Patient will also need prescription for insulin pens and pen needles at discharge.

## 2021-02-10 NOTE — PROGRESS NOTES
Late entry: BSR received  Patient resting in quietly  Respirations even on RA with no signs of distress  Patient denies needs/pain  Bed in lowest position wheels locked call light within reach  Patient instructed to call for assistance when needed with understanding verbalized

## 2021-02-10 NOTE — PROGRESS NOTES
[address] February 10, 2021       RE: Salud King      To Whom It May Concern,    This is to certify that Saldu King may not return to work until after follow up appointment with [facility] on February 25,2021 and they release him for work. Please feel free to contact my office if you have any questions or concerns. Thank you for your assistance in this matter.       Sincerely,  Corrie Delgado, RN   [phone]

## 2021-02-10 NOTE — DISCHARGE SUMMARY
Hospitalist Discharge Summary     Admit Date:  2021  7:48 PM   DC note date: 2/10/2021  Name:  Juanjo Kerr   Age:  64 y.o.  :  1960   MRN:  [id number]   PCP:  Usha Price MD  Treatment Team: Attending Provider: Wilda Mata MD; Care Manager: Lindy Dempsey RN; Utilization Review: Shanae Guerra RN    Problem List for this Hospitalization:  Hospital Problems as of 2/10/2021 Date Reviewed: 2/10/2021          Codes Class Noted - Resolved POA    * (Principal) 2019 novel coronavirus disease (COVID-19) ICD-10-CM: U07.1  ICD-9-CM: 079.89  2021 - Present Yes        Cavitary lung disease ICD-10-CM: J98.4  ICD-9-CM: 518.89  2021 - Present Yes        RESOLVED: Acute hypoxemic respiratory failure ([facility] [address] ICD-10-CM: J96.01  ICD-9-CM: 518.81  2021 - 2/10/2021 Yes        RESOLVED: Sepsis ([facility] [address] ICD-10-CM: A41.9  ICD-9-CM: 038.9, 995.91  2021 - 2/10/2021 Yes              Hospital Course:  Mr. Ana Fontanez is a very nice 65 y/o [address] with PMHx of MG who recently immigrated from [facility] 2.5 years ago. He was admitted on  for hypoxemic respiratory failure. Was recently diagnosed with COVID-19. CT chest showed bilateral cavitary lesions in the upper lobes and bibasilar GGOs w/o evidence of PE. Patient stated that he had a bronchoscopy that ruled out TB prior to his treatment w/ immunosuppressants for myasthenia gravis from [id number]. He has not seen a physician since he has been in the [facility] and reported he has received all vaccinations prior to moving here. Started on dexamethasone and Unasyn. Pulmonology was consulted. Sputum culture with klebsiella pna. He was transitioned to Augmentin. AFB smears so far are negative, final one sent today. D/w Pulm who are ok with discharge and no work, self-quarantine, all other precautions until at least his follow up appointment. D/w Infection Control who will d/w [facility]. He is on RA and did no desaturate with ambulation.  Decadron stopped as on RA and was creating hard to control hyperglycemia. Started on metformin for uncontrolled DM. Needs PCP f/u within 1-2 weeks, has Pulmonary follow up in 2 weeks for further management and consideration of bronchoscopy. Advised to self quarantine and not to return to work until cleared by Pulmonary. Hospital course was otherwise unremarkable and he is medically stable for discharge home. Disposition: Home or Self Care  Activity: Activity as tolerated  Diet: DIET DIABETIC CONSISTENT CARB Regular  Code Status: Full Code    Follow Up Orders: Follow-up Appointments   Procedures    FOLLOW UP VISIT Appointment in: Other (Specify) [facility] -- 2 weeks PCP -- 1 week     [facility] -- 2 weeks  PCP -- 1 week     Standing Status:   Standing     Number of Occurrences:   1     Order Specific Question:   Appointment in     Answer: Other (Specify)       Follow-up Information     Follow up With Specialties Details Why Contact Info    Tejinder Desai NP Nurse Practitioner On 2/25/2021 10 AM [facility] [address]  [phone]      Other, MD Carlos    Patient can only remember the practice name and not the physician            Discharge meds at bottom of this note. Plan was discussed with patient, . All questions answered. Patient was stable at time of discharge. Given instructions to call a physician or return if any concerns. Discharge summary and encounter summary was sent to PCP electronically via \"Comm Mgt\" link in [facility], if possible. Diagnostic Imaging/Tests:   Xr Chest Sngl V    Result Date: 2/9/2021  EXAM: Chest x-ray. INDICATION: Dyspnea. Covid 19. COMPARISON: Prior chest x-ray and CT chest on February 4, 2021. TECHNIQUE: Frontal view chest x-ray. FINDINGS: Bilateral lung infiltrates are unchanged, demonstrating cavitation in the upper lobes. The heart size is normal. No pneumothorax or pleural effusion is seen. Unchanged bilateral lung infiltrates.     Ct Chest W Cont    Result Date: 2/4/2021  CT OF THE CHEST WITH CONTRAST HISTORY: Bilateral cavitary lung lesions COMPARISON: Chest x-ray dated 9/5/1318 TECHNIQUE: A helical acquisition was performed through the chest utilizing 5.5UB slice thickness during the intravenous infusion of 100 cc of Isovue-370. Coronal and sagittal reformats were obtained. Dose reduction techniques used: Automated exposure control, adjustment of the mAs and/or kVp according to patient's size, and iterative reconstruction techniques. FINDINGS: *  PLEURA / PERICARDIUM: Within normal limits. *  LUNGS: Bilateral upper lobe cavitary lung lesions. One in particular in the left upper lobe on image number 31 appears to represent a cavitating nodule measuring 1.4 x 1.3 cm. I lateral groundglass opacities primarily in the lower lobes. *  LATONIA / MEDIASTINUM: Within normal limits. *  TRACHEOBRONCHIAL TREE: Within normal limits. *  AORTA/PULMONARY VESSELS: Within normal limits. *  HEART: Within normal limits. *  CORONARY ARTERIES: No coronary artery calcification is seen. *  CHEST WALL/AXILLA: Within normal limits. *  VISUALIZED UPPER ABDOMEN: Within normal limits. *  SPINE / BONES: Within normal limits. *  ADDITIONAL COMMENTS: None. Bilateral cavitating upper lobe lung nodules. The differential diagnosis is broad but these could represent septic emboli, necrotizing pneumonia including tuberculosis. Based on their appearance I would place neoplasm less likely. Also consider polyangiitis such as Wegener's granulomatosis. Findings this with bilateral basilar Covid pneumonia. Date of Dictation: 2/4/2021 10:54 PM     Xr Chest Port    Result Date: 2/4/2021  EXAM: XR CHEST PORT INDICATION: shob COMPARISON: None. FINDINGS: A portable AP radiograph of the chest was obtained at 2038 hours. The patient is on a cardiac monitor. Bilateral airspace disease with evidence of cavitation in both upper lobes. . The cardiac and mediastinal contours and pulmonary vascularity are normal.  The bones and soft tissues are grossly within normal limits. Bilateral pneumonia with evidence of cavitation in both upper lobes. Echocardiogram/EKG results:  No results found for this visit on 02/04/21. No results found for this or any previous visit. Procedures done this admission:  * No surgery found *    All Micro Results     Procedure Component Value Units Date/Time    AFB CULTURE + SMEAR W/RFLX ID FROM CULTURE [[id number]] Collected: 02/10/21 1441    Order Status: Completed Updated: 02/10/21 1456    CULTURE, RESPIRATORY/SPUTUM/BRONCH W Edrie Patron STAIN [[id number]]  (Abnormal)  (Susceptibility) Collected: 02/06/21 0653    Order Status: Completed Specimen: Sputum Updated: 02/10/21 0922     Special Requests: NO SPECIAL REQUESTS        GRAM STAIN 0 TO 5 WBCS SEEN PER OIF      0 TO 1 EPITHELIAL CELLS SEEN PER OIF      RARE GRAM POSITIVE COCCI               MODERATE GRAM NEGATIVE RODS            FEW YEAST         1+ MUCUS PRESENT        Culture result:       HEAVY KLEBSIELLA PNEUMONIAE                  LIGHT NORMAL RESPIRATORY MONICA          CULTURE, BLOOD [[id number]] Collected: 02/05/21 0714    Order Status: Completed Specimen: Blood Updated: 02/10/21 0732     Special Requests: --        LEFT  Antecubital       Culture result: NO GROWTH 5 DAYS       AFB CULTURE + SMEAR W/RFLX ID FROM CULTURE [[id number]] Collected: 02/09/21 2355    Order Status: Completed Updated: 02/10/21 0424    QUANTIFERON-TB GOLD PLUS [[id number]] Collected: 02/05/21 0531    Order Status: Completed Specimen: Blood from Serum Updated: 02/09/21 1935     QuantiFERON Incubation Incubation performed. QuantiFERON Plus Incubation performed.      Comment: (NOTE)  Performed At: [facility] [address], [place] [id number]  Aminata Palm MD TY:[id number]         . [address] [[id number]] Collected: 02/08/21 0630    Order Status: Completed Specimen: Other from Miscellaneous sample Updated: 02/09/21 1236 Source EXPECTORATED SPUTUM        Comment: yes  Corrected on 02/08 AT 0630: This is a correction to the medical record of the test results previously reported as EXPECTORATED SPUTUM          Fungus stain Direct Inoculation     Fungus (Mycology) Culture Other source received     Comment: (NOTE)  Performed At: [address] [id number]  Gabe Prajapati MD MH:[id number]         AFB CULTURE + SMEAR W/RFLX ID FROM CULTURE [[id number]] Collected: 02/09/21 1012    Order Status: Completed Updated: 02/09/21 1034    CULTURE, BLOOD [[id number]] Collected: 02/04/21 2016    Order Status: Completed Specimen: Blood Updated: 02/09/21 0733     Special Requests: --        RIGHT  Antecubital       Culture result: NO GROWTH 5 DAYS       MSSA/MRSA SC BY PCR, NASAL SWAB [[id number]] Collected: 02/04/21 2315    Order Status: Completed Specimen: Nasal swab Updated: 02/05/21 2323     Special Requests: NO SPECIAL REQUESTS        Culture result:       SA target not detected. A MRSA NEGATIVE, SA NEGATIVE test result does not preclude MRSA or SA nasal colonization. COVID-19 RAPID TEST [[id number]]  (Abnormal) Collected: 02/04/21 2000    Order Status: Completed Specimen: Nasopharyngeal Updated: 02/05/21 1717     Specimen source Nasopharyngeal        COVID-19 rapid test Detected        Comment:      The specimen is POSITIVE for SARS-CoV-2, the novel coronavirus associated with COVID-19. This test has been authorized by the FDA under an Emergency Use Authorization (EUA) for use by authorized laboratories.         Fact sheet for Healthcare Providers: ConventionUpdate.co.nz  Fact sheet for Patients: ConventionUpdate.co.nz       Methodology: Isothermal Nucleic Acid Amplification         AFB CULTURE + SMEAR W/RFLX ID FROM CULTURE [[id number]]     Order Status: Canceled     FUNGUS CULTURE AND SMEAR [[id number]]     Order Status: Canceled Specimen: Other           SARS-CoV-2 Lab Results  \"Novel Coronavirus\" Test: No results found for: COV2NT   \"Emergent Disease\" Test: No results found for: EDPR  \"SARS-COV-2\" Test: No results found for: XGCOVT  Rapid Test:   Lab Results   Component Value Date/Time    COVR Detected (AA) 02/04/2021 08:00 PM            Labs: Results:       BMP, Mg, Phos Recent Labs     02/10/21  0758 02/09/21  0748 02/08/21  1059    138 131*   K 4.1 3.8 3.9    104 99   CO2 29 29 23   AGAP 7 5* 9   BUN 13 11 15   CREA 0.72* 0.65* 0.87   CA 9.6 9.4 9.4   * 157* 338*      CBC Recent Labs     02/10/21  0758 02/09/21  0748 02/08/21  1059   WBC 12.8* 10.4 19.2*   RBC 5.07 4.70 5.02   HGB 12.2* 11.5* 12.1*   HCT 38.8* 36.1* 38.1*    395 439   GRANS 73 73 84*   LYMPH 15 13 7*   EOS 1 1 0*   MONOS 7 9 7   BASOS 1 1 0   IG 4 5 2   ANEU 9.4* 7.6 16.0*   ABL 1.9 1.3 1.4   GISEL 0.1 0.1 0.0   ABM 0.9 0.9 1.3   ABB 0.1 0.1 0.1   AIG 0.5 0.5 0.4      LFT Recent Labs     02/10/21  0758 02/09/21  0748 02/08/21  1059   ALT 21 17 21   AP 69 71 86   TP 6.8 6.5 7.0   ALB 2.6* 2.3* 2.5*   GLOB 4.2* 4.2* 4.5*   AGRAT 0.6* 0.5* 0.6*      Cardiac Testing No results found for: BNPP, BNP, CPK, RCK1, RCK2, RCK3, RCK4, CKMB, CKNDX, CKND1, TROPT, TROIQ   Coagulation Tests Lab Results   Component Value Date/Time    Prothrombin time 14.5 02/05/2021 05:31 AM    INR 1.1 02/05/2021 05:31 AM    aPTT 34.7 02/05/2021 05:31 AM      A1c Lab Results   Component Value Date/Time    Hemoglobin A1c 11.2 (H) 02/04/2021 08:16 PM      Lipid Panel No results found for: CHOL, CHOLPOCT, CHOLX, CHLST, CHOLV, 206140, HDL, HDLP, LDL, LDLC, DLDLP, 976609, VLDLC, VLDL, TGLX, TRIGL, TRIGP, TGLPOCT, CHHD, CHHDX   Thyroid Panel No results found for: TSH, T4, FT4, TT3, T3U, TSHEXT     Most Recent UA No results found for: COLOR, APPRN, REFSG, LAKHWINDER, PROTU, GLUCU, KETU, BILU, BLDU, UROU, XIOMARA, LEUKU, WBCU, RBCU, UEPI, BACTU, CASTS, UCRY, MUCUS, UCOM     No Known Allergies    There is no immunization history on file for this patient. All Labs from Last 24 Hrs:  Recent Results (from the past 24 hour(s))   GLUCOSE, POC    Collection Time: 02/09/21  4:25 PM   Result Value Ref Range    Glucose (POC) 465 (HH) 65 - 100 mg/dL   GLUCOSE, POC    Collection Time: 02/09/21  8:49 PM   Result Value Ref Range    Glucose (POC) 329 (H) 65 - 100 mg/dL   GLUCOSE, POC    Collection Time: 02/10/21  5:56 AM   Result Value Ref Range    Glucose (POC) 149 (H) 65 - 100 mg/dL   CBC WITH AUTOMATED DIFF    Collection Time: 02/10/21  7:58 AM   Result Value Ref Range    WBC 12.8 (H) 4.3 - 11.1 K/uL    RBC 5.07 4.23 - 5.6 M/uL    HGB 12.2 (L) 13.6 - 17.2 g/dL    HCT 38.8 (L) 41.1 - 50.3 %    MCV 76.5 (L) 79.6 - 97.8 FL    MCH 24.1 (L) 26.1 - 32.9 PG    MCHC 31.4 31.4 - 35.0 g/dL    RDW 13.8 11.9 - 14.6 %    PLATELET 592 815 - 426 K/uL    MPV 9.2 (L) 9.4 - 12.3 FL    ABSOLUTE NRBC 0.00 0.0 - 0.2 K/uL    DF AUTOMATED      NEUTROPHILS 73 43 - 78 %    LYMPHOCYTES 15 13 - 44 %    MONOCYTES 7 4.0 - 12.0 %    EOSINOPHILS 1 0.5 - 7.8 %    BASOPHILS 1 0.0 - 2.0 %    IMMATURE GRANULOCYTES 4 0.0 - 5.0 %    ABS. NEUTROPHILS 9.4 (H) 1.7 - 8.2 K/UL    ABS. LYMPHOCYTES 1.9 0.5 - 4.6 K/UL    ABS. MONOCYTES 0.9 0.1 - 1.3 K/UL    ABS. EOSINOPHILS 0.1 0.0 - 0.8 K/UL    ABS. BASOPHILS 0.1 0.0 - 0.2 K/UL    ABS. IMM. GRANS. 0.5 0.0 - 0.5 K/UL   METABOLIC PANEL, COMPREHENSIVE    Collection Time: 02/10/21  7:58 AM   Result Value Ref Range    Sodium 140 136 - 145 mmol/L    Potassium 4.1 3.5 - 5.1 mmol/L    Chloride 104 98 - 107 mmol/L    CO2 29 21 - 32 mmol/L    Anion gap 7 7 - 16 mmol/L    Glucose 132 (H) 65 - 100 mg/dL    BUN 13 6 - 23 MG/DL    Creatinine 0.72 (L) 0.8 - 1.5 MG/DL    GFR est AA >60 >60 ml/min/1.73m2    GFR est non-AA >60 >60 ml/min/1.73m2    Calcium 9.6 8.3 - 10.4 MG/DL    Bilirubin, total 0.4 0.2 - 1.1 MG/DL    ALT (SGPT) 21 12 - 65 U/L    AST (SGOT) 10 (L) 15 - 37 U/L    Alk.  phosphatase 69 50 - 136 U/L Protein, total 6.8 6.3 - 8.2 g/dL    Albumin 2.6 (L) 3.5 - 5.0 g/dL    Globulin 4.2 (H) 2.3 - 3.5 g/dL    A-G Ratio 0.6 (L) 1.2 - 3.5     GLUCOSE, POC    Collection Time: 02/10/21 11:30 AM   Result Value Ref Range    Glucose (POC) 249 (H) 65 - 100 mg/dL       Discharge Exam:  Patient Vitals for the past 24 hrs:   Temp Pulse Resp BP SpO2   02/10/21 1531 98.1 °F (36.7 °C) 77 18 104/67 94 %   02/10/21 1135 98.4 °F (36.9 °C) 83 18 101/63 94 %   02/10/21 0750 98.3 °F (36.8 °C) 70 18 101/68 94 %   02/10/21 0425 97 °F (36.1 °C) 69 18 104/68 98 %   02/10/21 0048 97.1 °F (36.2 °C) 86 18 107/61 96 %   02/09/21 2030 97.8 °F (36.6 °C) 78 19 98/60 96 %     Oxygen Therapy  O2 Sat (%): 94 % (02/10/21 1531)  Pulse via Oximetry: 74 beats per minute (02/06/21 0727)  O2 Device: Room air (02/10/21 0805)  O2 Flow Rate (L/min): 1 l/min (02/08/21 1950)    Estimated body mass index is 23.73 kg/m² as calculated from the following:    Height as of this encounter: 5' 10\" (1.778 m). Weight as of this encounter: 75 kg (165 lb 6.4 oz). Intake/Output Summary (Last 24 hours) at 2/10/2021 1543  Last data filed at 2/10/2021 1230  Gross per 24 hour   Intake 1130 ml   Output    Net 1130 ml       *Note that automatically entered I/Os may not be accurate; dependent on patient compliance with collection and accurate  by assistants. General:    Well nourished. No overt distress  Eyes:   Normal sclerae. Extraocular movements intact. ENT:  Normocephalic, atraumatic. Moist mucous membranes  CV:   Regular rate and rhythm. No m/r/g. No edema. Lungs:  Decreased at the bases, RA O2, NAD otherwise. Abdomen: Soft, nontender, nondistended. Extremities: Warm and dry. No cyanosis or clubbing  Neurologic: CN II-XII grossly intact. No gross focal deficits. Alert. Skin:     No rashes. No jaundice. Psych:  Normal mood and affect.     Current Med List in Hospital:   Current Facility-Administered Medications   Medication Dose Route Frequency   • metFORMIN (GLUCOPHAGE) tablet 1,000 mg  1,000 mg Oral BID WITH MEALS   • amoxicillin-clavulanate (AUGMENTIN) 875-125 mg per tablet 1 Tab  1 Tab Oral BID WITH MEALS   • insulin lispro (HUMALOG) injection 8 Units  8 Units SubCUTAneous TIDAC   • traMADoL (ULTRAM) tablet 50 mg  50 mg Oral BID PRN   • guaiFENesin ER (MUCINEX) tablet 600 mg  600 mg Oral Q12H   • benzonatate (TESSALON) capsule 100 mg  100 mg Oral TID PRN   • famotidine (PEPCID) tablet 40 mg  40 mg Oral DAILY   • cholecalciferol (VITAMIN D3) (1000 Units /25 mcg) tablet 1,000 Units  1,000 Units Oral DAILY   • dexAMETHasone (DECADRON) tablet 6 mg  6 mg Oral DAILY   • sodium chloride (NS) flush 5-40 mL  5-40 mL IntraVENous Q8H   • sodium chloride (NS) flush 5-40 mL  5-40 mL IntraVENous PRN   • acetaminophen (TYLENOL) tablet 650 mg  650 mg Oral Q6H PRN    Or   • acetaminophen (TYLENOL) suppository 650 mg  650 mg Rectal Q6H PRN   • polyethylene glycol (MIRALAX) packet 17 g  17 g Oral DAILY PRN   • promethazine (PHENERGAN) tablet 12.5 mg  12.5 mg Oral Q6H PRN    Or   • ondansetron (ZOFRAN) injection 4 mg  4 mg IntraVENous Q6H PRN   • enoxaparin (LOVENOX) injection 40 mg  40 mg SubCUTAneous DAILY   • zolpidem (AMBIEN) tablet 5 mg  5 mg Oral QHS PRN   • alum-mag hydroxide-simeth (MYLANTA) oral suspension 30 mL  30 mL Oral Q4H PRN   • insulin glargine (LANTUS) injection 15 Units  0.2 Units/kg SubCUTAneous DAILY   • insulin lispro (HUMALOG) injection   SubCUTAneous AC&HS   • dextrose 40% (GLUTOSE) oral gel 1 Tube  15 g Oral PRN   • glucagon (GLUCAGEN) injection 1 mg  1 mg IntraMUSCular PRN   • dextrose (D50W) injection syrg 12.5-25 g  25-50 mL IntraVENous PRN   • albuterol (PROVENTIL HFA, VENTOLIN HFA, PROAIR HFA) inhaler 2 Puff  2 Puff Inhalation Q6H PRN       Discharge Info:   Current Discharge Medication List      START taking these medications    Details   amoxicillin-clavulanate (AUGMENTIN) 875-125 mg per tablet Take 1 Tab by mouth two (2) times  daily (with meals) for 30 days. Qty: 60 Tab, Refills: 0      metFORMIN (GLUCOPHAGE) 1,000 mg tablet Take 1 Tab by mouth two (2) times daily (with meals) for 90 days. Qty: 180 Tab, Refills: 0      Blood-Glucose Meter monitoring kit One glucometer kit with test strips and lancets per patient's insurance. Check 4x daily, AC/HS. Qty: 1 Kit, Refills: 0               Time spent in patient discharge planning and coordination 35 minutes.     Signed:  Beatrice Arana MD

## 2021-02-10 NOTE — PROGRESS NOTES
Discharge instructions, follow up information, medication list, and prescription information explained to the patient via telephone due to COVID isolation precautions. Paper copy of discharge information to be given to patient by primary RN, IV to be removed by primary RN. Opportunity for questions provided. Patient states does not have transportation home and requesting round trip.

## 2021-02-12 LAB
DISCLAIMER:, 130261: ABNORMAL
FUNGUS SPEC CULT: NORMAL
FUNGUS STAIN, 188244: NORMAL
H CAPSUL AG SER IA-ACNC: POSITIVE NG/ML
Lab: 0.7 NG/ML
SPECIMEN SOURCE: NORMAL

## 2021-02-13 LAB
FUNGUS CULTURE, RFCO2T: POSITIVE
FUNGUS ID 1, (DID), RFIM1T: NORMAL
FUNGUS SMEAR, RFCO1T: ABNORMAL
REFLEX TO ID, RFCO3T: ABNORMAL
SPECIMEN SOURCE: ABNORMAL
SPECIMEN SOURCE: NORMAL

## 2021-03-01 LAB
ACID FAST STN SPEC: POSITIVE
M AVIUM CMPLX RRNA SPEC QL PROBE: NEGATIVE
M GORDONAE RRNA SPEC QL PROBE: ABNORMAL
M KANSASII RRNA SPEC QL PROBE: ABNORMAL
M TB CMPLX RRNA SPEC QL PROBE: POSITIVE
MYCOBACTERIUM SPEC QL CULT: POSITIVE
OTHER, RAFBI6: ABNORMAL
SPECIMEN PREPARATION: ABNORMAL
SPECIMEN SOURCE: ABNORMAL
SPECIMEN SOURCE: ABNORMAL
SUSCEPT TESTING, RAFBI7: ABNORMAL

## 2021-03-26 LAB
ACID FAST STN SPEC: NEGATIVE
ACID FAST STN SPEC: POSITIVE
AGAR PROPORTION CONFIRM, AFR75: ABNORMAL
ETHAMBUTOL 5 UG/ML ISLT SLOWMYCO: ABNORMAL
ISONIAZID 0.1 UG/ML ISLT SLOWMYCO: ABNORMAL
M AVIUM CMPLX RRNA SPEC QL PROBE: NEGATIVE
M GORDONAE RRNA SPEC QL PROBE: ABNORMAL
M KANSASII RRNA SPEC QL PROBE: ABNORMAL
M TB CMPLX RRNA SPEC QL PROBE: POSITIVE
MYCOBACTERIUM ISLT: ABNORMAL
MYCOBACTERIUM SPEC QL CULT: NEGATIVE
MYCOBACTERIUM SPEC QL CULT: POSITIVE
OTHER, RAFBI6: ABNORMAL
PZA 100 UG/ML ISLT SLOWMYCO: ABNORMAL
RIFAMPIN 1 UG/ML ISLT SLOWMYCO: ABNORMAL
SPECIMEN PREPARATION: ABNORMAL
SPECIMEN PREPARATION: NORMAL
SPECIMEN SOURCE: ABNORMAL
SPECIMEN SOURCE: NORMAL
STREPTOMYCIN 1 UG/ML ISLT SLOWMYCO: ABNORMAL
SUSCEPT TESTING, RAFBI7: ABNORMAL

## 2021-04-05 PROBLEM — A15.0 TB (PULMONARY TUBERCULOSIS): Status: ACTIVE | Noted: 2021-04-05

## 2021-04-09 LAB
AMIKACIN ISLT MIC: ABNORMAL
CIPROFLOXACIN ISLT MIC: ABNORMAL
CLARITHRO ISLT MIC: ABNORMAL
ETHAMBUTOL ISLT MIC: ABNORMAL
LINEZOLID ISLT MIC: ABNORMAL
MICROORGANISM/AGENT SPEC: ABNORMAL
MOXIFLOXACIN ISLT MIC: ABNORMAL
PLEASE NOTE, AFR16: ABNORMAL
RIFAMPIN ISLT MIC: ABNORMAL
SPECIMEN SOURCE: ABNORMAL
STREPTOMYCIN ISLT MIC: ABNORMAL

## 2022-03-18 PROBLEM — U07.1 2019 NOVEL CORONAVIRUS DISEASE (COVID-19): Status: ACTIVE | Noted: 2021-02-05

## 2022-03-19 PROBLEM — A15.0 TB (PULMONARY TUBERCULOSIS): Status: ACTIVE | Noted: 2021-04-05

## 2022-03-20 PROBLEM — J98.4 CAVITARY LUNG DISEASE: Status: ACTIVE | Noted: 2021-02-05

## 2023-04-28 NOTE — PROGRESS NOTES
Approving, but needs appt for additional refills.    PM assessment done. No changes noted. Respiration even and unlabored 20/min at rest. No s/s of pain noted at present. Encouraged to call with needs.

## 2023-09-08 ENCOUNTER — HOSPITAL ENCOUNTER (INPATIENT)
Age: 63
LOS: 10 days | Discharge: HOME | DRG: 177 | End: 2023-09-18
Attending: FAMILY MEDICINE | Admitting: INTERNAL MEDICINE
Payer: COMMERCIAL

## 2023-09-08 ENCOUNTER — HOSPITAL ENCOUNTER (EMERGENCY)
Age: 63
Discharge: HOME | End: 2023-09-08
Attending: EMERGENCY MEDICINE
Payer: COMMERCIAL

## 2023-09-08 ENCOUNTER — APPOINTMENT (OUTPATIENT)
Dept: GENERAL RADIOLOGY | Age: 63
End: 2023-09-08
Payer: COMMERCIAL

## 2023-09-08 ENCOUNTER — APPOINTMENT (OUTPATIENT)
Dept: CT IMAGING | Age: 63
End: 2023-09-08
Payer: COMMERCIAL

## 2023-09-08 VITALS
WEIGHT: 156 LBS | SYSTOLIC BLOOD PRESSURE: 170 MMHG | RESPIRATION RATE: 18 BRPM | BODY MASS INDEX: 22.33 KG/M2 | HEART RATE: 97 BPM | DIASTOLIC BLOOD PRESSURE: 97 MMHG | TEMPERATURE: 98.4 F | HEIGHT: 70 IN | OXYGEN SATURATION: 99 %

## 2023-09-08 DIAGNOSIS — R04.2 HEMOPTYSIS: Primary | ICD-10-CM

## 2023-09-08 DIAGNOSIS — J90 PLEURAL EFFUSION: ICD-10-CM

## 2023-09-08 DIAGNOSIS — A15.9 TUBERCULOSIS: Primary | ICD-10-CM

## 2023-09-08 DIAGNOSIS — R91.1 LESION OF LUNG: ICD-10-CM

## 2023-09-08 PROBLEM — Z86.16 HISTORY OF COVID-19: Status: ACTIVE | Noted: 2021-02-05

## 2023-09-08 PROBLEM — Z86.11 HISTORY OF TB (TUBERCULOSIS): Chronic | Status: ACTIVE | Noted: 2021-04-05

## 2023-09-08 PROBLEM — Z86.16 HISTORY OF COVID-19: Chronic | Status: ACTIVE | Noted: 2021-02-05

## 2023-09-08 PROBLEM — E11.9 TYPE 2 DIABETES MELLITUS, WITHOUT LONG-TERM CURRENT USE OF INSULIN (HCC): Chronic | Status: ACTIVE | Noted: 2023-09-08

## 2023-09-08 LAB
ALBUMIN SERPL-MCNC: 3.6 G/DL (ref 3.2–4.6)
ALBUMIN/GLOB SERPL: 0.8 (ref 0.4–1.6)
ALP SERPL-CCNC: 84 U/L (ref 50–136)
ALT SERPL-CCNC: 16 U/L (ref 12–65)
ANION GAP SERPL CALC-SCNC: 6 MMOL/L (ref 2–11)
AST SERPL-CCNC: 15 U/L (ref 15–37)
BASOPHILS # BLD: 0 K/UL (ref 0–0.2)
BASOPHILS NFR BLD: 0 % (ref 0–2)
BILIRUB SERPL-MCNC: 0.7 MG/DL (ref 0.2–1.1)
BUN SERPL-MCNC: 14 MG/DL (ref 8–23)
CALCIUM SERPL-MCNC: 9.9 MG/DL (ref 8.3–10.4)
CHLORIDE SERPL-SCNC: 105 MMOL/L (ref 101–110)
CO2 SERPL-SCNC: 26 MMOL/L (ref 21–32)
CREAT SERPL-MCNC: 1.22 MG/DL (ref 0.8–1.5)
DIFFERENTIAL METHOD BLD: ABNORMAL
EKG ATRIAL RATE: 93 BPM
EKG DIAGNOSIS: NORMAL
EKG P AXIS: 90 DEGREES
EKG P-R INTERVAL: 152 MS
EKG Q-T INTERVAL: 326 MS
EKG QRS DURATION: 72 MS
EKG QTC CALCULATION (BAZETT): 405 MS
EKG R AXIS: 30 DEGREES
EKG T AXIS: 54 DEGREES
EKG VENTRICULAR RATE: 93 BPM
EOSINOPHIL # BLD: 0.1 K/UL (ref 0–0.8)
EOSINOPHIL NFR BLD: 1 % (ref 0.5–7.8)
ERYTHROCYTE [DISTWIDTH] IN BLOOD BY AUTOMATED COUNT: 15.6 % (ref 11.9–14.6)
GLOBULIN SER CALC-MCNC: 4.6 G/DL (ref 2.8–4.5)
GLUCOSE BLD STRIP.AUTO-MCNC: 136 MG/DL (ref 65–100)
GLUCOSE SERPL-MCNC: 120 MG/DL (ref 65–100)
HCT VFR BLD AUTO: 38.1 % (ref 41.1–50.3)
HGB BLD-MCNC: 11.7 G/DL (ref 13.6–17.2)
IMM GRANULOCYTES # BLD AUTO: 0.1 K/UL (ref 0–0.5)
IMM GRANULOCYTES NFR BLD AUTO: 1 % (ref 0–5)
LYMPHOCYTES # BLD: 1.2 K/UL (ref 0.5–4.6)
LYMPHOCYTES NFR BLD: 11 % (ref 13–44)
MAGNESIUM SERPL-MCNC: 1.8 MG/DL (ref 1.8–2.4)
MCH RBC QN AUTO: 22.9 PG (ref 26.1–32.9)
MCHC RBC AUTO-ENTMCNC: 30.7 G/DL (ref 31.4–35)
MCV RBC AUTO: 74.7 FL (ref 82–102)
MONOCYTES # BLD: 0.9 K/UL (ref 0.1–1.3)
MONOCYTES NFR BLD: 8 % (ref 4–12)
NEUTS SEG # BLD: 8.8 K/UL (ref 1.7–8.2)
NEUTS SEG NFR BLD: 80 % (ref 43–78)
NRBC # BLD: 0 K/UL (ref 0–0.2)
PLATELET # BLD AUTO: 274 K/UL (ref 150–450)
PMV BLD AUTO: 9.2 FL (ref 9.4–12.3)
POTASSIUM SERPL-SCNC: 4.4 MMOL/L (ref 3.5–5.1)
PROT SERPL-MCNC: 8.2 G/DL (ref 6.3–8.2)
RBC # BLD AUTO: 5.1 M/UL (ref 4.23–5.6)
SERVICE CMNT-IMP: ABNORMAL
SODIUM SERPL-SCNC: 137 MMOL/L (ref 133–143)
TROPONIN I SERPL HS-MCNC: 5 PG/ML (ref 0–14)
WBC # BLD AUTO: 11 K/UL (ref 4.3–11.1)

## 2023-09-08 PROCEDURE — 80053 COMPREHEN METABOLIC PANEL: CPT

## 2023-09-08 PROCEDURE — 71260 CT THORAX DX C+: CPT

## 2023-09-08 PROCEDURE — 93010 ELECTROCARDIOGRAM REPORT: CPT | Performed by: INTERNAL MEDICINE

## 2023-09-08 PROCEDURE — 2580000003 HC RX 258: Performed by: NURSE PRACTITIONER

## 2023-09-08 PROCEDURE — 2580000003 HC RX 258: Performed by: INTERNAL MEDICINE

## 2023-09-08 PROCEDURE — 1100000000 HC RM PRIVATE

## 2023-09-08 PROCEDURE — 6370000000 HC RX 637 (ALT 250 FOR IP): Performed by: INTERNAL MEDICINE

## 2023-09-08 PROCEDURE — 93005 ELECTROCARDIOGRAM TRACING: CPT | Performed by: NURSE PRACTITIONER

## 2023-09-08 PROCEDURE — 99285 EMERGENCY DEPT VISIT HI MDM: CPT

## 2023-09-08 PROCEDURE — 83735 ASSAY OF MAGNESIUM: CPT

## 2023-09-08 PROCEDURE — 6360000004 HC RX CONTRAST MEDICATION: Performed by: NURSE PRACTITIONER

## 2023-09-08 PROCEDURE — 84484 ASSAY OF TROPONIN QUANT: CPT

## 2023-09-08 PROCEDURE — 6360000002 HC RX W HCPCS: Performed by: INTERNAL MEDICINE

## 2023-09-08 PROCEDURE — 87040 BLOOD CULTURE FOR BACTERIA: CPT

## 2023-09-08 PROCEDURE — 71046 X-RAY EXAM CHEST 2 VIEWS: CPT

## 2023-09-08 PROCEDURE — 36415 COLL VENOUS BLD VENIPUNCTURE: CPT

## 2023-09-08 PROCEDURE — 85025 COMPLETE CBC W/AUTO DIFF WBC: CPT

## 2023-09-08 PROCEDURE — 82962 GLUCOSE BLOOD TEST: CPT

## 2023-09-08 RX ORDER — MAGNESIUM HYDROXIDE/ALUMINUM HYDROXICE/SIMETHICONE 120; 1200; 1200 MG/30ML; MG/30ML; MG/30ML
30 SUSPENSION ORAL EVERY 6 HOURS PRN
Status: DISCONTINUED | OUTPATIENT
Start: 2023-09-08 | End: 2023-09-18 | Stop reason: HOSPADM

## 2023-09-08 RX ORDER — 0.9 % SODIUM CHLORIDE 0.9 %
100 INTRAVENOUS SOLUTION INTRAVENOUS ONCE
Status: COMPLETED | OUTPATIENT
Start: 2023-09-08 | End: 2023-09-08

## 2023-09-08 RX ORDER — INSULIN LISPRO 100 [IU]/ML
0-10 INJECTION, SOLUTION INTRAVENOUS; SUBCUTANEOUS
Status: DISCONTINUED | OUTPATIENT
Start: 2023-09-09 | End: 2023-09-18 | Stop reason: HOSPADM

## 2023-09-08 RX ORDER — LANOLIN ALCOHOL/MO/W.PET/CERES
3 CREAM (GRAM) TOPICAL NIGHTLY PRN
Status: DISCONTINUED | OUTPATIENT
Start: 2023-09-08 | End: 2023-09-18 | Stop reason: HOSPADM

## 2023-09-08 RX ORDER — OXYCODONE HYDROCHLORIDE 5 MG/1
5 TABLET ORAL EVERY 4 HOURS PRN
Status: DISCONTINUED | OUTPATIENT
Start: 2023-09-08 | End: 2023-09-18 | Stop reason: HOSPADM

## 2023-09-08 RX ORDER — FAMOTIDINE 20 MG/1
10 TABLET, FILM COATED ORAL DAILY PRN
Status: DISCONTINUED | OUTPATIENT
Start: 2023-09-08 | End: 2023-09-18 | Stop reason: HOSPADM

## 2023-09-08 RX ORDER — POTASSIUM CHLORIDE 20 MEQ/1
40 TABLET, EXTENDED RELEASE ORAL PRN
Status: DISCONTINUED | OUTPATIENT
Start: 2023-09-08 | End: 2023-09-18 | Stop reason: HOSPADM

## 2023-09-08 RX ORDER — INSULIN LISPRO 100 [IU]/ML
0-8 INJECTION, SOLUTION INTRAVENOUS; SUBCUTANEOUS NIGHTLY
Status: DISCONTINUED | OUTPATIENT
Start: 2023-09-08 | End: 2023-09-08

## 2023-09-08 RX ORDER — INSULIN LISPRO 100 [IU]/ML
0-10 INJECTION, SOLUTION INTRAVENOUS; SUBCUTANEOUS
Status: DISCONTINUED | OUTPATIENT
Start: 2023-09-08 | End: 2023-09-08

## 2023-09-08 RX ORDER — SODIUM CHLORIDE 0.9 % (FLUSH) 0.9 %
5-40 SYRINGE (ML) INJECTION PRN
Status: DISCONTINUED | OUTPATIENT
Start: 2023-09-08 | End: 2023-09-18 | Stop reason: HOSPADM

## 2023-09-08 RX ORDER — LISINOPRIL 10 MG/1
10 TABLET ORAL DAILY
Status: ON HOLD | COMMUNITY
Start: 2023-08-24

## 2023-09-08 RX ORDER — SODIUM CHLORIDE 0.9 % (FLUSH) 0.9 %
5-40 SYRINGE (ML) INJECTION 2 TIMES DAILY
Status: DISCONTINUED | OUTPATIENT
Start: 2023-09-08 | End: 2023-09-08 | Stop reason: HOSPADM

## 2023-09-08 RX ORDER — MAGNESIUM SULFATE IN WATER 40 MG/ML
2000 INJECTION, SOLUTION INTRAVENOUS PRN
Status: DISCONTINUED | OUTPATIENT
Start: 2023-09-08 | End: 2023-09-18 | Stop reason: HOSPADM

## 2023-09-08 RX ORDER — LISINOPRIL 5 MG/1
10 TABLET ORAL DAILY
Status: DISCONTINUED | OUTPATIENT
Start: 2023-09-09 | End: 2023-09-18 | Stop reason: HOSPADM

## 2023-09-08 RX ORDER — ATORVASTATIN CALCIUM 20 MG/1
20 TABLET, FILM COATED ORAL
Status: ON HOLD | COMMUNITY
Start: 2023-08-24

## 2023-09-08 RX ORDER — CLONIDINE HYDROCHLORIDE 0.1 MG/1
0.1 TABLET ORAL EVERY 4 HOURS PRN
Status: DISCONTINUED | OUTPATIENT
Start: 2023-09-08 | End: 2023-09-18 | Stop reason: HOSPADM

## 2023-09-08 RX ORDER — INSULIN LISPRO 100 [IU]/ML
0-8 INJECTION, SOLUTION INTRAVENOUS; SUBCUTANEOUS NIGHTLY
Status: DISCONTINUED | OUTPATIENT
Start: 2023-09-08 | End: 2023-09-18 | Stop reason: HOSPADM

## 2023-09-08 RX ORDER — BISACODYL 10 MG
10 SUPPOSITORY, RECTAL RECTAL DAILY PRN
Status: DISCONTINUED | OUTPATIENT
Start: 2023-09-08 | End: 2023-09-18 | Stop reason: HOSPADM

## 2023-09-08 RX ORDER — POLYETHYLENE GLYCOL 3350 17 G/17G
17 POWDER, FOR SOLUTION ORAL DAILY PRN
Status: DISCONTINUED | OUTPATIENT
Start: 2023-09-08 | End: 2023-09-18 | Stop reason: HOSPADM

## 2023-09-08 RX ORDER — ONDANSETRON 4 MG/1
4 TABLET, ORALLY DISINTEGRATING ORAL EVERY 8 HOURS PRN
Status: DISCONTINUED | OUTPATIENT
Start: 2023-09-08 | End: 2023-09-18 | Stop reason: HOSPADM

## 2023-09-08 RX ORDER — POTASSIUM CHLORIDE 7.45 MG/ML
10 INJECTION INTRAVENOUS PRN
Status: DISCONTINUED | OUTPATIENT
Start: 2023-09-08 | End: 2023-09-08 | Stop reason: SDUPTHER

## 2023-09-08 RX ORDER — HYDRALAZINE HYDROCHLORIDE 20 MG/ML
20 INJECTION INTRAMUSCULAR; INTRAVENOUS EVERY 4 HOURS PRN
Status: DISCONTINUED | OUTPATIENT
Start: 2023-09-08 | End: 2023-09-18 | Stop reason: HOSPADM

## 2023-09-08 RX ORDER — GUAIFENESIN/DEXTROMETHORPHAN 100-10MG/5
10 SYRUP ORAL EVERY 4 HOURS PRN
Status: DISCONTINUED | OUTPATIENT
Start: 2023-09-08 | End: 2023-09-18 | Stop reason: HOSPADM

## 2023-09-08 RX ORDER — SODIUM CHLORIDE 0.9 % (FLUSH) 0.9 %
5-40 SYRINGE (ML) INJECTION EVERY 12 HOURS SCHEDULED
Status: DISCONTINUED | OUTPATIENT
Start: 2023-09-08 | End: 2023-09-18 | Stop reason: HOSPADM

## 2023-09-08 RX ORDER — DEXTROSE MONOHYDRATE 100 MG/ML
INJECTION, SOLUTION INTRAVENOUS CONTINUOUS PRN
Status: DISCONTINUED | OUTPATIENT
Start: 2023-09-08 | End: 2023-09-08

## 2023-09-08 RX ORDER — SODIUM CHLORIDE 9 MG/ML
INJECTION, SOLUTION INTRAVENOUS PRN
Status: DISCONTINUED | OUTPATIENT
Start: 2023-09-08 | End: 2023-09-18 | Stop reason: HOSPADM

## 2023-09-08 RX ORDER — ACETAMINOPHEN 325 MG/1
650 TABLET ORAL EVERY 6 HOURS PRN
Status: DISCONTINUED | OUTPATIENT
Start: 2023-09-08 | End: 2023-09-18 | Stop reason: HOSPADM

## 2023-09-08 RX ORDER — DEXTROSE MONOHYDRATE 100 MG/ML
INJECTION, SOLUTION INTRAVENOUS CONTINUOUS PRN
Status: DISCONTINUED | OUTPATIENT
Start: 2023-09-08 | End: 2023-09-18 | Stop reason: HOSPADM

## 2023-09-08 RX ORDER — ATORVASTATIN CALCIUM 20 MG/1
20 TABLET, FILM COATED ORAL DAILY
Status: DISCONTINUED | OUTPATIENT
Start: 2023-09-09 | End: 2023-09-09

## 2023-09-08 RX ORDER — ACETAMINOPHEN 650 MG/1
650 SUPPOSITORY RECTAL EVERY 6 HOURS PRN
Status: DISCONTINUED | OUTPATIENT
Start: 2023-09-08 | End: 2023-09-18 | Stop reason: HOSPADM

## 2023-09-08 RX ORDER — ONDANSETRON 2 MG/ML
4 INJECTION INTRAMUSCULAR; INTRAVENOUS EVERY 6 HOURS PRN
Status: DISCONTINUED | OUTPATIENT
Start: 2023-09-08 | End: 2023-09-18 | Stop reason: HOSPADM

## 2023-09-08 RX ORDER — GUAIFENESIN 600 MG/1
1200 TABLET, EXTENDED RELEASE ORAL 2 TIMES DAILY
Status: DISCONTINUED | OUTPATIENT
Start: 2023-09-08 | End: 2023-09-18 | Stop reason: HOSPADM

## 2023-09-08 RX ORDER — POTASSIUM CHLORIDE 7.45 MG/ML
10 INJECTION INTRAVENOUS PRN
Status: DISCONTINUED | OUTPATIENT
Start: 2023-09-08 | End: 2023-09-18 | Stop reason: HOSPADM

## 2023-09-08 RX ADMIN — HYDRALAZINE HYDROCHLORIDE 20 MG: 20 INJECTION, SOLUTION INTRAMUSCULAR; INTRAVENOUS at 22:04

## 2023-09-08 RX ADMIN — IOPAMIDOL 100 ML: 755 INJECTION, SOLUTION INTRAVENOUS at 16:10

## 2023-09-08 RX ADMIN — SODIUM CHLORIDE 100 ML: 9 INJECTION, SOLUTION INTRAVENOUS at 16:10

## 2023-09-08 RX ADMIN — GUAIFENESIN 1200 MG: 600 TABLET ORAL at 22:05

## 2023-09-08 RX ADMIN — SODIUM CHLORIDE, PRESERVATIVE FREE 10 ML: 5 INJECTION INTRAVENOUS at 21:00

## 2023-09-08 RX ADMIN — ACETAMINOPHEN 650 MG: 325 TABLET ORAL at 23:14

## 2023-09-08 ASSESSMENT — PAIN - FUNCTIONAL ASSESSMENT
PAIN_FUNCTIONAL_ASSESSMENT: ACTIVITIES ARE NOT PREVENTED
PAIN_FUNCTIONAL_ASSESSMENT: 0-10

## 2023-09-08 ASSESSMENT — PAIN SCALES - GENERAL
PAINLEVEL_OUTOF10: 0
PAINLEVEL_OUTOF10: 3
PAINLEVEL_OUTOF10: 0
PAINLEVEL_OUTOF10: 0

## 2023-09-08 ASSESSMENT — PAIN DESCRIPTION - LOCATION: LOCATION: GENERALIZED

## 2023-09-08 ASSESSMENT — LIFESTYLE VARIABLES
HOW MANY STANDARD DRINKS CONTAINING ALCOHOL DO YOU HAVE ON A TYPICAL DAY: 1 OR 2
HOW OFTEN DO YOU HAVE A DRINK CONTAINING ALCOHOL: 2-3 TIMES A WEEK

## 2023-09-08 ASSESSMENT — PAIN DESCRIPTION - DESCRIPTORS: DESCRIPTORS: ACHING

## 2023-09-08 NOTE — H&P
Hospitalist History and Physical   Admit Date:  2023  2:27 PM   Name:  Emily Silva   Age:  66 y.o. Sex:  male  :  1960   MRN:  [id number]   Room:  [facility]    Presenting/Chief Complaint: Hemoptysis     Reason(s) for Admission: No admission diagnoses are documented for this encounter. History of Present Illness:   Emily Silva is a 66 y.o. male with medical history of myasthenia gravis, DM, HTN, HLD, COVID and pulmonary TB 2021, who presented with hemoptysis. Hemoptysis started yesterday. He does not describe it as coughing but saying he feels like he has to clear his throat to bring something up, and then he produces blood. He denies and SOB at rest.  He \"maybe a little\" has VACA. He works at [employer] and has not had any recent travel. He denies fevers, chills, night sweats, weight loss. CT chest showed   1. No acute pulmonary embolism evident. New cavitary mass of the left lung apex measuring approximately 3.9 x 4.3 cm patient with previously seen cavitary nodules from chest CT dated 2021. Broad differential diagnosis could include necrotizing pneumonia including tuberculosis, cavitary neoplasm is felt less likely although possible. Septic pulmonary emboli felt to be unlikely given overall improved appearance of the cavitary nodules of the lungs although with a new left apical cavitary mass. 2. New moderately sized loculated left-sided pleural effusion with pleural enhancement raising the possibility of empyema. Fluid sampling can be considered  if clinically indicated. 3. Additional pulmonary nodules as above which appear largely stable. Recommend  follow-up with chest CT for further evaluation. Previously for TB per pulmonary notes received Rifampin 600mg M-F,  mg M-F, PZA 1500 mg M-F, EMB 1200 mg M-F, B6 50mg; treatment plan was 130 doses. He reports compliance with this.       Assessment & Plan:       New cavitary mass of the left lung apex 3.9 RBC 5.10 4.23 - 5.6 M/uL    Hemoglobin 11.7 (L) 13.6 - 17.2 g/dL    Hematocrit 38.1 (L) 41.1 - 50.3 %    MCV 74.7 (L) 82.0 - 102.0 FL    MCH 22.9 (L) 26.1 - 32.9 PG    MCHC 30.7 (L) 31.4 - 35.0 g/dL    RDW 15.6 (H) 11.9 - 14.6 %    Platelets 558 925 - 765 K/uL    MPV 9.2 (L) 9.4 - 12.3 FL    nRBC 0.00 0.0 - 0.2 K/uL    Differential Type AUTOMATED      Neutrophils % 80 (H) 43 - 78 %    Lymphocytes % 11 (L) 13 - 44 %    Monocytes % 8 4.0 - 12.0 %    Eosinophils % 1 0.5 - 7.8 %    Basophils % 0 0.0 - 2.0 %    Immature Granulocytes 1 0.0 - 5.0 %    Neutrophils Absolute 8.8 (H) 1.7 - 8.2 K/UL    Lymphocytes Absolute 1.2 0.5 - 4.6 K/UL    Monocytes Absolute 0.9 0.1 - 1.3 K/UL    Eosinophils Absolute 0.1 0.0 - 0.8 K/UL    Basophils Absolute 0.0 0.0 - 0.2 K/UL    Absolute Immature Granulocyte 0.1 0.0 - 0.5 K/UL   Magnesium    Collection Time: 09/08/23  2:59 PM   Result Value Ref Range    Magnesium 1.8 1.8 - 2.4 mg/dL   Comprehensive Metabolic Panel    Collection Time: 09/08/23  2:59 PM   Result Value Ref Range    Sodium 137 133 - 143 mmol/L    Potassium 4.4 3.5 - 5.1 mmol/L    Chloride 105 101 - 110 mmol/L    CO2 26 21 - 32 mmol/L    Anion Gap 6 2 - 11 mmol/L    Glucose 120 (H) 65 - 100 mg/dL    BUN 14 8 - 23 MG/DL    Creatinine 1.22 0.8 - 1.5 MG/DL    Est, Glom Filt Rate >60 >60 ml/min/1.73m2    Calcium 9.9 8.3 - 10.4 MG/DL    Total Bilirubin 0.7 0.2 - 1.1 MG/DL    ALT 16 12 - 65 U/L    AST 15 15 - 37 U/L    Alk Phosphatase 84 50 - 136 U/L    Total Protein 8.2 6.3 - 8.2 g/dL    Albumin 3.6 3.2 - 4.6 g/dL    Globulin 4.6 (H) 2.8 - 4.5 g/dL    Albumin/Globulin Ratio 0.8 0.4 - 1.6         I have personally reviewed imaging studies:  XR CHEST (2 VW)    Result Date: 9/8/2023  EXAM: 2 view chest radiograph. INDICATION: Hemoptysis since yesterday. Upper chest/throat itching/burning. COMPARISON: Chest radiograph dated February 09, 2021.  FINDINGS: Small to moderate loculated appearing left-sided pleural effusion with hazy

## 2023-09-08 NOTE — ED NOTES
[facility] here to tx to [facility] room [address], [address]  09/08/23 1911

## 2023-09-08 NOTE — ED NOTES
TRANSFER - OUT REPORT:    Verbal report given to [place]  on Claven Lesser  being transferred to [facility]  for routine progression of patient care       Report consisted of patient's Situation, Background, Assessment and   Recommendations(SBAR). Information from the following report(s) Nurse Handoff Report was reviewed with the receiving nurse. Lines:   Peripheral IV 09/08/23 Left;Posterior Hand (Active)   Site Assessment Clean, dry & intact 09/08/23 1424   Line Status Blood return noted;Specimen collected; Flushed;Normal saline locked 09/08/23 1424   Phlebitis Assessment No symptoms 09/08/23 1424   Infiltration Assessment 0 09/08/23 1424       Peripheral IV Right Antecubital (Active)        Opportunity for questions and clarification was provided.       Patient transported with: Derek Schwab, RN  09/08/23 4252

## 2023-09-08 NOTE — ED TRIAGE NOTES
Reports hemoptysis since yesterday. Reports upper chest/throat itching/burning  Denies blood thinners.  Denies cough

## 2023-09-08 NOTE — ED PROVIDER NOTES
Emergency Department Provider Note       PCP: CESAR Pizano CNP   Age: 66 y.o. Sex: male     DISPOSITION       No diagnosis found. Medical Decision Making     Complexity of Problems Addressed:  1 or more acute illnesses that pose a threat to life or bodily function. Data Reviewed and Analyzed:   I independently ordered and reviewed each unique test.  I reviewed external records: provider visit note from PCP. I reviewed external records: provider visit note from outside specialist.       I independently ordered and interpreted the ED EKG in the absence of a Cardiologist.    Rate: 93  EKG Interpretation: Sinus rhythm  ST Segments: No STEMI  I interpreted the CT angiogram of the chest with no acute PE noted as read by radiologist..    Discussion of management or test interpretation. As in HPI. Patient with central chest pain and hemoptysis. Denies recent URI, nasal congestion or postnasal drip. Denies pain with swallowing. Denies history of hemoptysis. No blood thinner use. Low clinical suspicion some of ACS, dissection. Patient is of moderate risk for PE per Wells criteria. Differential includes the above as well as tuberculosis, pneumonia, pneumothorax, other. Plan for chest x-ray, EKG, single troponin, labs, CT angiogram of the chest.  As patient's pain has been present for 1 day, do not think repeat troponin will be indicated. EKG reviewed, chest x-ray as below, negative troponin and largely reassuring labs. CT angiogram returned with findings as below, no PE. Lung lesion, pleural effusion noted. Discussed results with patient's. Discussed with ED attending and have consulted the hospitalist for admission. Dr. Genevieve Renteria of the hospital service is admitted the patient and will transfer to downtown facility. I have discussed findings and plan with patient and he is agreeable. Answered all questions.     The patient was admitted and I have discussed patient management with the

## 2023-09-09 PROBLEM — J98.4 CAVITARY PNEUMONIA: Status: ACTIVE | Noted: 2023-09-09

## 2023-09-09 PROBLEM — J18.9 CAVITARY PNEUMONIA: Status: ACTIVE | Noted: 2023-09-09

## 2023-09-09 LAB
ANION GAP SERPL CALC-SCNC: 6 MMOL/L (ref 2–11)
BASOPHILS # BLD: 0 K/UL (ref 0–0.2)
BASOPHILS NFR BLD: 0 % (ref 0–2)
BUN SERPL-MCNC: 15 MG/DL (ref 8–23)
CALCIUM SERPL-MCNC: 9.6 MG/DL (ref 8.3–10.4)
CHLORIDE SERPL-SCNC: 110 MMOL/L (ref 101–110)
CO2 SERPL-SCNC: 24 MMOL/L (ref 21–32)
CREAT SERPL-MCNC: 1.3 MG/DL (ref 0.8–1.5)
DIFFERENTIAL METHOD BLD: ABNORMAL
EOSINOPHIL # BLD: 0.2 K/UL (ref 0–0.8)
EOSINOPHIL NFR BLD: 2 % (ref 0.5–7.8)
ERYTHROCYTE [DISTWIDTH] IN BLOOD BY AUTOMATED COUNT: 15.5 % (ref 11.9–14.6)
EST. AVERAGE GLUCOSE BLD GHB EST-MCNC: 157 MG/DL
GLUCOSE BLD STRIP.AUTO-MCNC: 114 MG/DL (ref 65–100)
GLUCOSE BLD STRIP.AUTO-MCNC: 143 MG/DL (ref 65–100)
GLUCOSE BLD STRIP.AUTO-MCNC: 152 MG/DL (ref 65–100)
GLUCOSE BLD STRIP.AUTO-MCNC: 152 MG/DL (ref 65–100)
GLUCOSE SERPL-MCNC: 117 MG/DL (ref 65–100)
HBA1C MFR BLD: 7.1 % (ref 4.8–5.6)
HCT VFR BLD AUTO: 34.1 % (ref 41.1–50.3)
HGB BLD-MCNC: 10.6 G/DL (ref 13.6–17.2)
IMM GRANULOCYTES # BLD AUTO: 0 K/UL (ref 0–0.5)
IMM GRANULOCYTES NFR BLD AUTO: 0 % (ref 0–5)
LYMPHOCYTES # BLD: 1.2 K/UL (ref 0.5–4.6)
LYMPHOCYTES NFR BLD: 13 % (ref 13–44)
MCH RBC QN AUTO: 23.2 PG (ref 26.1–32.9)
MCHC RBC AUTO-ENTMCNC: 31.1 G/DL (ref 31.4–35)
MCV RBC AUTO: 74.6 FL (ref 82–102)
MONOCYTES # BLD: 0.9 K/UL (ref 0.1–1.3)
MONOCYTES NFR BLD: 10 % (ref 4–12)
NEUTS SEG # BLD: 7.2 K/UL (ref 1.7–8.2)
NEUTS SEG NFR BLD: 75 % (ref 43–78)
NRBC # BLD: 0 K/UL (ref 0–0.2)
PLATELET # BLD AUTO: 258 K/UL (ref 150–450)
PMV BLD AUTO: 9.2 FL (ref 9.4–12.3)
POTASSIUM SERPL-SCNC: 4.4 MMOL/L (ref 3.5–5.1)
PROCALCITONIN SERPL-MCNC: 0.11 NG/ML (ref 0–0.49)
RBC # BLD AUTO: 4.57 M/UL (ref 4.23–5.6)
SERVICE CMNT-IMP: ABNORMAL
SODIUM SERPL-SCNC: 140 MMOL/L (ref 133–143)
WBC # BLD AUTO: 9.6 K/UL (ref 4.3–11.1)

## 2023-09-09 PROCEDURE — 6370000000 HC RX 637 (ALT 250 FOR IP): Performed by: INTERNAL MEDICINE

## 2023-09-09 PROCEDURE — 80048 BASIC METABOLIC PNL TOTAL CA: CPT

## 2023-09-09 PROCEDURE — 82962 GLUCOSE BLOOD TEST: CPT

## 2023-09-09 PROCEDURE — 2580000003 HC RX 258: Performed by: INTERNAL MEDICINE

## 2023-09-09 PROCEDURE — 1100000000 HC RM PRIVATE

## 2023-09-09 PROCEDURE — 99223 1ST HOSP IP/OBS HIGH 75: CPT | Performed by: INTERNAL MEDICINE

## 2023-09-09 PROCEDURE — 84145 PROCALCITONIN (PCT): CPT

## 2023-09-09 PROCEDURE — 83036 HEMOGLOBIN GLYCOSYLATED A1C: CPT

## 2023-09-09 PROCEDURE — 36415 COLL VENOUS BLD VENIPUNCTURE: CPT

## 2023-09-09 PROCEDURE — 85025 COMPLETE CBC W/AUTO DIFF WBC: CPT

## 2023-09-09 PROCEDURE — 86480 TB TEST CELL IMMUN MEASURE: CPT

## 2023-09-09 RX ORDER — ATORVASTATIN CALCIUM 20 MG/1
20 TABLET, FILM COATED ORAL NIGHTLY
Status: DISCONTINUED | OUTPATIENT
Start: 2023-09-09 | End: 2023-09-18 | Stop reason: HOSPADM

## 2023-09-09 RX ORDER — UBIDECARENONE 100 MG
100 CAPSULE ORAL DAILY
COMMUNITY

## 2023-09-09 RX ORDER — NAPROXEN 500 MG/1
500 TABLET ORAL 2 TIMES DAILY WITH MEALS
Status: ON HOLD | COMMUNITY
End: 2023-09-18 | Stop reason: SDUPTHER

## 2023-09-09 RX ADMIN — POLYETHYLENE GLYCOL 3350 17 G: 17 POWDER, FOR SOLUTION ORAL at 21:30

## 2023-09-09 RX ADMIN — SODIUM CHLORIDE, PRESERVATIVE FREE 10 ML: 5 INJECTION INTRAVENOUS at 22:08

## 2023-09-09 RX ADMIN — GUAIFENESIN 1200 MG: 600 TABLET ORAL at 21:25

## 2023-09-09 RX ADMIN — LISINOPRIL 10 MG: 5 TABLET ORAL at 09:37

## 2023-09-09 RX ADMIN — ATORVASTATIN CALCIUM 20 MG: 20 TABLET, FILM COATED ORAL at 21:25

## 2023-09-09 RX ADMIN — ACETAMINOPHEN 650 MG: 325 TABLET ORAL at 21:24

## 2023-09-09 RX ADMIN — GUAIFENESIN 1200 MG: 600 TABLET ORAL at 09:37

## 2023-09-09 RX ADMIN — SODIUM CHLORIDE, PRESERVATIVE FREE 10 ML: 5 INJECTION INTRAVENOUS at 09:37

## 2023-09-09 ASSESSMENT — PAIN SCALES - GENERAL
PAINLEVEL_OUTOF10: 0
PAINLEVEL_OUTOF10: 0

## 2023-09-09 NOTE — CONSULTS
History and Physical Initial Visit NOTE           9/9/2023    Jeffry Onofre                        Date of Admission:  9/8/2023    The patient's chart is reviewed and the patient is discussed with the staff. Subjective:     Patient is a 66 y.o.  male seen and evaluated at the request of Dr. Yudy Camara. PMH includes myasthenia gravis, HTN, HLD, never smoker, hx of TB in 2021 treated with  Rifampin 600mg M-F,  mg M-F, PZA 1500 mg M-F, EMB 1200 mg M-F, B6 50mg; treatment plan was 130 doses. He reports compliance with this. Was being [facility] by [facility]. Presented into the ER on 9/8 for hemoptysis and pain in central chest. Reported coughing up \"red clots\" of blood. Denied difficulties breathing, fevers/chills, hx of recent illness or URI. Denies cardiac hx and PE/DVT hx.     Procal 0.11, WBC 9.6, D dimer WNL, BC NGTD    AFB, respiratory culture, and body fluid culture need collection. Subjective:  Sitting up in bed on RA. Denies any fevers/chills, weight loss, rash. Relays he is coughing up blood when he clears his throat, hast not experienced a cough. Denies hemoptysis that would fill half a cup. Does relay it looks like blood clots. Review of Systems: Comprehensive ROS negative except in HPI    Current Outpatient Medications   Medication Instructions    atorvastatin (LIPITOR) 20 mg, Oral    lisinopril (PRINIVIL;ZESTRIL) 10 mg, Oral, DAILY    metFORMIN (GLUCOPHAGE) 1,000 mg, Oral, 2 TIMES DAILY WITH MEALS    naproxen (NAPROSYN) 500 mg, Oral, 2 TIMES DAILY WITH MEALS      Past Medical History:   Diagnosis Date    Hypertension     Myasthenia gravis ([address])      No past surgical history on file.   Social History     Socioeconomic History    Marital status:      Spouse name: Not on file    Number of children: Not on file    Years of education: Not on file    Highest education level: Not on file   Occupational History    Not on file   Tobacco Use    Smoking status: Never

## 2023-09-09 NOTE — PLAN OF CARE
Problem: Pain  Goal: Verbalizes/displays adequate comfort level or baseline comfort level  9/9/2023 1027 by Vinod Lucas RN  Outcome: Progressing  9/8/2023 2331 by Eliot Alex RN  Outcome: Progressing     Problem: Chronic Conditions and Co-morbidities  Goal: Patient's chronic conditions and co-morbidity symptoms are monitored and maintained or improved  Outcome: Progressing

## 2023-09-09 NOTE — CONSULTS
Infectious Disease Consult    Today's Date: 9/9/2023   Admit Date: 9/8/2023    Impression:   Hemoptysis with new left lung apex 3.9 x 4.3 cm cavitary mass with surrounding bronchiectasis and possible left empyema based on enhancement on the film  History of pulmonary tuberculosis diagnosed at [facility] in February 2021, treated at the health department and patient reports compliance. Smears were 4+ positive at that time. History of BCG as a child  Diabetes mellitus type 2 with recent hemoglobin A1c of 7.1  No history of smoking    Plan:   I have requested HIV testing in the morning just for completion sake. I have ordered sputum's for AFB x3 times at least 8 hours apart  I agree with pulmonary consultation as he will likely need further work-up for etiology. I will check a cryptococcal antigen as we are in an endemic area. It is not unheard of for infections from different microbiologic etiologies to take advantage of previously infected areas in the lung from prior tuberculosis treatment. It would be unusual to reactivate tuberculosis after an appropriate treatment course through the health department, but we will need to call the health department on Monday and make sure they agree with the history we have  I doubt the [place] helps us in this situation whether it is negative or positive? ID will continue to follow. ID will follow up on Monday or sooner if called      Anti-infectives:   none    Subjective:   Date of Consultation:  September 9, 2023  Referring Physician: Divya    Patient is a 66 y.o. male With a history of pulmonary tuberculosis diagnosed during a February 2021 admission. The patient reports he had COVID at that time, but was found to have bilateral cavitating upper lobe lung nodules and as part of the work-up he was 4+ smear positive, found to be tuberculosis, and reports he was treated by the health department.   Interestingly, when I asked him if he took 6 months of therapy

## 2023-09-09 NOTE — PLAN OF CARE
Problem: Discharge Planning  Goal: Discharge to home or other facility with appropriate resources  Outcome: Progressing  Flowsheets (Taken 9/8/2023 2005)  Discharge to home or other facility with appropriate resources: Identify barriers to discharge with patient and caregiver     Problem: Pain  Goal: Verbalizes/displays adequate comfort level or baseline comfort level  Outcome: Progressing

## 2023-09-10 LAB
ANION GAP SERPL CALC-SCNC: 7 MMOL/L (ref 2–11)
BASOPHILS # BLD: 0 K/UL (ref 0–0.2)
BASOPHILS NFR BLD: 0 % (ref 0–2)
BUN SERPL-MCNC: 14 MG/DL (ref 8–23)
CALCIUM SERPL-MCNC: 9.4 MG/DL (ref 8.3–10.4)
CHLORIDE SERPL-SCNC: 108 MMOL/L (ref 101–110)
CO2 SERPL-SCNC: 25 MMOL/L (ref 21–32)
CREAT SERPL-MCNC: 1.3 MG/DL (ref 0.8–1.5)
CRYPTOC AG SER QL LA: NEGATIVE
DIFFERENTIAL METHOD BLD: ABNORMAL
EOSINOPHIL # BLD: 0.2 K/UL (ref 0–0.8)
EOSINOPHIL NFR BLD: 2 % (ref 0.5–7.8)
ERYTHROCYTE [DISTWIDTH] IN BLOOD BY AUTOMATED COUNT: 15.9 % (ref 11.9–14.6)
GLUCOSE BLD STRIP.AUTO-MCNC: 126 MG/DL (ref 65–100)
GLUCOSE BLD STRIP.AUTO-MCNC: 143 MG/DL (ref 65–100)
GLUCOSE BLD STRIP.AUTO-MCNC: 173 MG/DL (ref 65–100)
GLUCOSE BLD STRIP.AUTO-MCNC: 188 MG/DL (ref 65–100)
GLUCOSE BLD STRIP.AUTO-MCNC: 224 MG/DL (ref 65–100)
GLUCOSE SERPL-MCNC: 129 MG/DL (ref 65–100)
HCT VFR BLD AUTO: 34.3 % (ref 41.1–50.3)
HGB BLD-MCNC: 10.6 G/DL (ref 13.6–17.2)
IMM GRANULOCYTES # BLD AUTO: 0.1 K/UL (ref 0–0.5)
IMM GRANULOCYTES NFR BLD AUTO: 1 % (ref 0–5)
LYMPHOCYTES # BLD: 1.2 K/UL (ref 0.5–4.6)
LYMPHOCYTES NFR BLD: 12 % (ref 13–44)
MCH RBC QN AUTO: 22.6 PG (ref 26.1–32.9)
MCHC RBC AUTO-ENTMCNC: 30.9 G/DL (ref 31.4–35)
MCV RBC AUTO: 73.3 FL (ref 82–102)
MONOCYTES # BLD: 1.1 K/UL (ref 0.1–1.3)
MONOCYTES NFR BLD: 10 % (ref 4–12)
NEUTS SEG # BLD: 7.9 K/UL (ref 1.7–8.2)
NEUTS SEG NFR BLD: 75 % (ref 43–78)
NRBC # BLD: 0 K/UL (ref 0–0.2)
PLATELET # BLD AUTO: 277 K/UL (ref 150–450)
PMV BLD AUTO: 9.4 FL (ref 9.4–12.3)
POTASSIUM SERPL-SCNC: 4.2 MMOL/L (ref 3.5–5.1)
RBC # BLD AUTO: 4.68 M/UL (ref 4.23–5.6)
SERVICE CMNT-IMP: ABNORMAL
SODIUM SERPL-SCNC: 140 MMOL/L (ref 133–143)
WBC # BLD AUTO: 10.5 K/UL (ref 4.3–11.1)

## 2023-09-10 PROCEDURE — 6370000000 HC RX 637 (ALT 250 FOR IP): Performed by: INTERNAL MEDICINE

## 2023-09-10 PROCEDURE — 36415 COLL VENOUS BLD VENIPUNCTURE: CPT

## 2023-09-10 PROCEDURE — 87206 SMEAR FLUORESCENT/ACID STAI: CPT

## 2023-09-10 PROCEDURE — 80048 BASIC METABOLIC PNL TOTAL CA: CPT

## 2023-09-10 PROCEDURE — 85025 COMPLETE CBC W/AUTO DIFF WBC: CPT

## 2023-09-10 PROCEDURE — 87116 MYCOBACTERIA CULTURE: CPT

## 2023-09-10 PROCEDURE — 87327 CRYPTOCOCCUS NEOFORM AG IA: CPT

## 2023-09-10 PROCEDURE — 87070 CULTURE OTHR SPECIMN AEROBIC: CPT

## 2023-09-10 PROCEDURE — 1100000000 HC RM PRIVATE

## 2023-09-10 PROCEDURE — 2580000003 HC RX 258: Performed by: INTERNAL MEDICINE

## 2023-09-10 PROCEDURE — 82962 GLUCOSE BLOOD TEST: CPT

## 2023-09-10 PROCEDURE — 87389 HIV-1 AG W/HIV-1&-2 AB AG IA: CPT

## 2023-09-10 PROCEDURE — 87205 SMEAR GRAM STAIN: CPT

## 2023-09-10 RX ADMIN — ATORVASTATIN CALCIUM 20 MG: 20 TABLET, FILM COATED ORAL at 21:31

## 2023-09-10 RX ADMIN — GUAIFENESIN 1200 MG: 600 TABLET ORAL at 21:31

## 2023-09-10 RX ADMIN — POLYETHYLENE GLYCOL 3350 17 G: 17 POWDER, FOR SOLUTION ORAL at 21:44

## 2023-09-10 RX ADMIN — SODIUM CHLORIDE, PRESERVATIVE FREE 5 ML: 5 INJECTION INTRAVENOUS at 21:33

## 2023-09-10 RX ADMIN — SODIUM CHLORIDE, PRESERVATIVE FREE 5 ML: 5 INJECTION INTRAVENOUS at 10:43

## 2023-09-10 RX ADMIN — LISINOPRIL 10 MG: 5 TABLET ORAL at 10:43

## 2023-09-10 RX ADMIN — ACETAMINOPHEN 650 MG: 325 TABLET ORAL at 21:36

## 2023-09-10 RX ADMIN — INSULIN LISPRO 3 UNITS: 100 INJECTION, SOLUTION INTRAVENOUS; SUBCUTANEOUS at 12:32

## 2023-09-10 RX ADMIN — GUAIFENESIN 1200 MG: 600 TABLET ORAL at 10:43

## 2023-09-10 ASSESSMENT — PAIN SCALES - GENERAL: PAINLEVEL_OUTOF10: 0

## 2023-09-10 NOTE — PLAN OF CARE
Problem: Discharge Planning  Goal: Discharge to home or other facility with appropriate resources  Outcome: Progressing  Flowsheets (Taken 9/9/2023 2017)  Discharge to home or other facility with appropriate resources: Identify barriers to discharge with patient and caregiver     Problem: Pain  Goal: Verbalizes/displays adequate comfort level or baseline comfort level  Outcome: Progressing     Problem: Chronic Conditions and Co-morbidities  Goal: Patient's chronic conditions and co-morbidity symptoms are monitored and maintained or improved  Outcome: Progressing

## 2023-09-10 NOTE — PLAN OF CARE
Problem: Discharge Planning  Goal: Discharge to home or other facility with appropriate resources  Outcome: Progressing  Flowsheets (Taken 9/10/2023 1926)  Discharge to home or other facility with appropriate resources: Identify barriers to discharge with patient and caregiver     Problem: Pain  Goal: Verbalizes/displays adequate comfort level or baseline comfort level  Outcome: Progressing     Problem: Chronic Conditions and Co-morbidities  Goal: Patient's chronic conditions and co-morbidity symptoms are monitored and maintained or improved  Outcome: Progressing

## 2023-09-11 LAB
ANION GAP SERPL CALC-SCNC: 9 MMOL/L (ref 2–11)
BASOPHILS # BLD: 0.1 K/UL (ref 0–0.2)
BASOPHILS NFR BLD: 1 % (ref 0–2)
BUN SERPL-MCNC: 16 MG/DL (ref 8–23)
CALCIUM SERPL-MCNC: 9.5 MG/DL (ref 8.3–10.4)
CHLORIDE SERPL-SCNC: 106 MMOL/L (ref 101–110)
CO2 SERPL-SCNC: 23 MMOL/L (ref 21–32)
CREAT SERPL-MCNC: 1.2 MG/DL (ref 0.8–1.5)
DIFFERENTIAL METHOD BLD: ABNORMAL
EOSINOPHIL # BLD: 0.2 K/UL (ref 0–0.8)
EOSINOPHIL NFR BLD: 2 % (ref 0.5–7.8)
ERYTHROCYTE [DISTWIDTH] IN BLOOD BY AUTOMATED COUNT: 15.9 % (ref 11.9–14.6)
GLUCOSE BLD STRIP.AUTO-MCNC: 144 MG/DL (ref 65–100)
GLUCOSE BLD STRIP.AUTO-MCNC: 146 MG/DL (ref 65–100)
GLUCOSE BLD STRIP.AUTO-MCNC: 167 MG/DL (ref 65–100)
GLUCOSE BLD STRIP.AUTO-MCNC: 184 MG/DL (ref 65–100)
GLUCOSE BLD STRIP.AUTO-MCNC: 221 MG/DL (ref 65–100)
GLUCOSE SERPL-MCNC: 133 MG/DL (ref 65–100)
HCT VFR BLD AUTO: 34.2 % (ref 41.1–50.3)
HGB BLD-MCNC: 10.7 G/DL (ref 13.6–17.2)
IMM GRANULOCYTES # BLD AUTO: 0.1 K/UL (ref 0–0.5)
IMM GRANULOCYTES NFR BLD AUTO: 1 % (ref 0–5)
LYMPHOCYTES # BLD: 1.5 K/UL (ref 0.5–4.6)
LYMPHOCYTES NFR BLD: 14 % (ref 13–44)
MCH RBC QN AUTO: 22.9 PG (ref 26.1–32.9)
MCHC RBC AUTO-ENTMCNC: 31.3 G/DL (ref 31.4–35)
MCV RBC AUTO: 73.1 FL (ref 82–102)
MONOCYTES # BLD: 1.2 K/UL (ref 0.1–1.3)
MONOCYTES NFR BLD: 12 % (ref 4–12)
NEUTS SEG # BLD: 7.3 K/UL (ref 1.7–8.2)
NEUTS SEG NFR BLD: 70 % (ref 43–78)
NRBC # BLD: 0 K/UL (ref 0–0.2)
PLATELET # BLD AUTO: 254 K/UL (ref 150–450)
PMV BLD AUTO: 9.2 FL (ref 9.4–12.3)
POTASSIUM SERPL-SCNC: 4 MMOL/L (ref 3.5–5.1)
RBC # BLD AUTO: 4.68 M/UL (ref 4.23–5.6)
SERVICE CMNT-IMP: ABNORMAL
SODIUM SERPL-SCNC: 138 MMOL/L (ref 133–143)
WBC # BLD AUTO: 10.3 K/UL (ref 4.3–11.1)

## 2023-09-11 PROCEDURE — 6370000000 HC RX 637 (ALT 250 FOR IP): Performed by: INTERNAL MEDICINE

## 2023-09-11 PROCEDURE — 80048 BASIC METABOLIC PNL TOTAL CA: CPT

## 2023-09-11 PROCEDURE — 87116 MYCOBACTERIA CULTURE: CPT

## 2023-09-11 PROCEDURE — 36415 COLL VENOUS BLD VENIPUNCTURE: CPT

## 2023-09-11 PROCEDURE — 1100000000 HC RM PRIVATE

## 2023-09-11 PROCEDURE — 85025 COMPLETE CBC W/AUTO DIFF WBC: CPT

## 2023-09-11 PROCEDURE — 2580000003 HC RX 258: Performed by: INTERNAL MEDICINE

## 2023-09-11 PROCEDURE — 87206 SMEAR FLUORESCENT/ACID STAI: CPT

## 2023-09-11 RX ADMIN — ACETAMINOPHEN 650 MG: 325 TABLET ORAL at 20:29

## 2023-09-11 RX ADMIN — ATORVASTATIN CALCIUM 20 MG: 20 TABLET, FILM COATED ORAL at 20:24

## 2023-09-11 RX ADMIN — SODIUM CHLORIDE, PRESERVATIVE FREE 10 ML: 5 INJECTION INTRAVENOUS at 20:24

## 2023-09-11 RX ADMIN — LISINOPRIL 10 MG: 5 TABLET ORAL at 10:12

## 2023-09-11 RX ADMIN — GUAIFENESIN 1200 MG: 600 TABLET ORAL at 10:12

## 2023-09-11 RX ADMIN — GUAIFENESIN 1200 MG: 600 TABLET ORAL at 20:24

## 2023-09-11 RX ADMIN — SODIUM CHLORIDE, PRESERVATIVE FREE 5 ML: 5 INJECTION INTRAVENOUS at 10:13

## 2023-09-11 ASSESSMENT — PAIN SCALES - GENERAL
PAINLEVEL_OUTOF10: 0
PAINLEVEL_OUTOF10: 3
PAINLEVEL_OUTOF10: 0

## 2023-09-11 ASSESSMENT — PAIN DESCRIPTION - LOCATION: LOCATION: GENERALIZED

## 2023-09-11 ASSESSMENT — PAIN - FUNCTIONAL ASSESSMENT: PAIN_FUNCTIONAL_ASSESSMENT: ACTIVITIES ARE NOT PREVENTED

## 2023-09-11 ASSESSMENT — PAIN DESCRIPTION - DESCRIPTORS: DESCRIPTORS: ACHING

## 2023-09-11 NOTE — CARE COORDINATION
Chart reviewed by  for potential discharge needs or concerns. Pt is insured with pharmacy benefits and is established with a PCP. No dc needs or concerns identified at present. Please notify/consult  if discharge needs arise. 09/11/23 1600   Service Assessment   Patient Orientation Alert and Oriented   Cognition Alert   History Provided By Medical Record   Primary Caregiver Self   Support Systems Spouse/Significant Other;Family Members   Patient's Healthcare Decision Maker is: Legal Next of [facility]   PCP Verified by CM Yes   Last Visit to PCP Within last 3 months  (8/24/2023)   Prior Functional Level Independent in ADLs/IADLs   Current Functional Level Independent in ADLs/IADLs   Can patient return to prior living arrangement Unknown at present   Ability to make needs known: Good   Family able to assist with home care needs: Yes   Would you like for me to discuss the discharge plan with any other family members/significant others, and if so, who? No   Financial Resources Other (Comment)  ([facility] commercial policy)   Community Resources None   Social/Functional History   Lives With Spouse   Type of Home Apartment   ADL Assistance Independent   Homemaking Assistance Independent   Ambulation Assistance Independent   Transfer Assistance Independent   Discharge Planning   Type of Residence Apartment   Living Arrangements Spouse/Significant Other   Current Services Prior To Admission None   Potential Assistance Needed N/A   DME Ordered? No   Potential Assistance Purchasing Medications No   Type of Home Care Services None   Patient expects to be discharged to: [address] [facility] Discharge   Transition of Care Consult (CM Consult) [facility] Discharge None   [employer] Information Provided?  No   Mode of Transport at Discharge Other (see comment)  (family)   Confirm Follow Up Transport Self   Condition of Participation: Discharge Planning   The Plan for Transition of

## 2023-09-11 NOTE — SIGNIFICANT EVENT
Patient is on airborne precautions for pulmonary tuberculosis rule out. Sputum AFB testing was ordered:  9/8 2030  9/9 0830  9/9 1830  9/10 0600    Medical providers are awaiting these tests including Infectious Disease and Pulmonology. None of these orders were obtained and remain active. I have notified charge nurse and requested immediate assistance.     Signed: Jesus Emmanuel AGACNP-BC

## 2023-09-12 PROBLEM — E61.1 IRON DEFICIENCY: Chronic | Status: ACTIVE | Noted: 2023-09-12

## 2023-09-12 LAB
ACID FAST STN SPEC: ABNORMAL
ACID FAST STN SPEC: ABNORMAL
ANION GAP SERPL CALC-SCNC: 7 MMOL/L (ref 2–11)
BACTERIA SPEC CULT: NORMAL
BASOPHILS # BLD: 0 K/UL (ref 0–0.2)
BASOPHILS NFR BLD: 0 % (ref 0–2)
BUN SERPL-MCNC: 22 MG/DL (ref 8–23)
CALCIUM SERPL-MCNC: 9.5 MG/DL (ref 8.3–10.4)
CHLORIDE SERPL-SCNC: 107 MMOL/L (ref 101–110)
CO2 SERPL-SCNC: 21 MMOL/L (ref 21–32)
CREAT SERPL-MCNC: 1.5 MG/DL (ref 0.8–1.5)
DIFFERENTIAL METHOD BLD: ABNORMAL
EOSINOPHIL # BLD: 0.2 K/UL (ref 0–0.8)
EOSINOPHIL NFR BLD: 2 % (ref 0.5–7.8)
ERYTHROCYTE [DISTWIDTH] IN BLOOD BY AUTOMATED COUNT: 15.4 % (ref 11.9–14.6)
FERRITIN SERPL-MCNC: 241 NG/ML (ref 8–388)
GLUCOSE BLD STRIP.AUTO-MCNC: 115 MG/DL (ref 65–100)
GLUCOSE BLD STRIP.AUTO-MCNC: 142 MG/DL (ref 65–100)
GLUCOSE BLD STRIP.AUTO-MCNC: 142 MG/DL (ref 65–100)
GLUCOSE BLD STRIP.AUTO-MCNC: 173 MG/DL (ref 65–100)
GLUCOSE SERPL-MCNC: 139 MG/DL (ref 65–100)
GRAM STN SPEC: NORMAL
HCT VFR BLD AUTO: 36.2 % (ref 41.1–50.3)
HGB BLD-MCNC: 11.4 G/DL (ref 13.6–17.2)
HIV 1+2 AB+HIV1 P24 AG SERPL QL IA: NONREACTIVE
HIV 1/2 RESULT COMMENT: NORMAL
IMM GRANULOCYTES # BLD AUTO: 0.1 K/UL (ref 0–0.5)
IMM GRANULOCYTES NFR BLD AUTO: 1 % (ref 0–5)
IRON SATN MFR SERPL: 11 %
IRON SERPL-MCNC: 26 UG/DL (ref 35–150)
LYMPHOCYTES # BLD: 1.6 K/UL (ref 0.5–4.6)
LYMPHOCYTES NFR BLD: 16 % (ref 13–44)
MCH RBC QN AUTO: 22.9 PG (ref 26.1–32.9)
MCHC RBC AUTO-ENTMCNC: 31.5 G/DL (ref 31.4–35)
MCV RBC AUTO: 72.7 FL (ref 82–102)
MONOCYTES # BLD: 1.2 K/UL (ref 0.1–1.3)
MONOCYTES NFR BLD: 12 % (ref 4–12)
MYCOBACTERIUM SPEC QL CULT: ABNORMAL
MYCOBACTERIUM SPEC QL CULT: ABNORMAL
NEUTS SEG # BLD: 7 K/UL (ref 1.7–8.2)
NEUTS SEG NFR BLD: 69 % (ref 43–78)
NRBC # BLD: 0 K/UL (ref 0–0.2)
PLATELET # BLD AUTO: 264 K/UL (ref 150–450)
PMV BLD AUTO: 9 FL (ref 9.4–12.3)
POTASSIUM SERPL-SCNC: 4.1 MMOL/L (ref 3.5–5.1)
RBC # BLD AUTO: 4.98 M/UL (ref 4.23–5.6)
SERVICE CMNT-IMP: ABNORMAL
SERVICE CMNT-IMP: NORMAL
SODIUM SERPL-SCNC: 135 MMOL/L (ref 133–143)
SPECIMEN PREPARATION: ABNORMAL
SPECIMEN PREPARATION: ABNORMAL
SPECIMEN SOURCE: ABNORMAL
SPECIMEN SOURCE: ABNORMAL
TIBC SERPL-MCNC: 228 UG/DL (ref 250–450)
WBC # BLD AUTO: 10.3 K/UL (ref 4.3–11.1)

## 2023-09-12 PROCEDURE — 82728 ASSAY OF FERRITIN: CPT

## 2023-09-12 PROCEDURE — 83540 ASSAY OF IRON: CPT

## 2023-09-12 PROCEDURE — 2580000003 HC RX 258: Performed by: INTERNAL MEDICINE

## 2023-09-12 PROCEDURE — 36415 COLL VENOUS BLD VENIPUNCTURE: CPT

## 2023-09-12 PROCEDURE — 99232 SBSQ HOSP IP/OBS MODERATE 35: CPT | Performed by: INTERNAL MEDICINE

## 2023-09-12 PROCEDURE — 87206 SMEAR FLUORESCENT/ACID STAI: CPT

## 2023-09-12 PROCEDURE — 82962 GLUCOSE BLOOD TEST: CPT

## 2023-09-12 PROCEDURE — 80048 BASIC METABOLIC PNL TOTAL CA: CPT

## 2023-09-12 PROCEDURE — 85025 COMPLETE CBC W/AUTO DIFF WBC: CPT

## 2023-09-12 PROCEDURE — 83550 IRON BINDING TEST: CPT

## 2023-09-12 PROCEDURE — 1100000000 HC RM PRIVATE

## 2023-09-12 PROCEDURE — 87116 MYCOBACTERIA CULTURE: CPT

## 2023-09-12 PROCEDURE — 6370000000 HC RX 637 (ALT 250 FOR IP): Performed by: INTERNAL MEDICINE

## 2023-09-12 RX ORDER — FERROUS SULFATE 325(65) MG
325 TABLET ORAL
Status: DISCONTINUED | OUTPATIENT
Start: 2023-09-13 | End: 2023-09-18 | Stop reason: HOSPADM

## 2023-09-12 RX ADMIN — GUAIFENESIN 1200 MG: 600 TABLET ORAL at 21:13

## 2023-09-12 RX ADMIN — SODIUM CHLORIDE, PRESERVATIVE FREE 5 ML: 5 INJECTION INTRAVENOUS at 09:41

## 2023-09-12 RX ADMIN — GUAIFENESIN 1200 MG: 600 TABLET ORAL at 09:40

## 2023-09-12 RX ADMIN — SODIUM CHLORIDE, PRESERVATIVE FREE 10 ML: 5 INJECTION INTRAVENOUS at 21:14

## 2023-09-12 RX ADMIN — ATORVASTATIN CALCIUM 20 MG: 20 TABLET, FILM COATED ORAL at 21:13

## 2023-09-12 ASSESSMENT — PAIN SCALES - GENERAL
PAINLEVEL_OUTOF10: 0
PAINLEVEL_OUTOF10: 0

## 2023-09-12 NOTE — PLAN OF CARE
Problem: Discharge Planning  Goal: Discharge to home or other facility with appropriate resources  9/12/2023 0010 by Chay Mack RN  Outcome: Progressing  9/11/2023 1120 by John Cruz RN  Outcome: Progressing  Flowsheets (Taken 9/11/2023 0830)  Discharge to home or other facility with appropriate resources: Identify barriers to discharge with patient and caregiver     Problem: Pain  Goal: Verbalizes/displays adequate comfort level or baseline comfort level  9/12/2023 0010 by Chay Mack RN  Outcome: Progressing  9/11/2023 1120 by John Cruz RN  Outcome: Progressing     Problem: Chronic Conditions and Co-morbidities  Goal: Patient's chronic conditions and co-morbidity symptoms are monitored and maintained or improved  9/12/2023 0010 by Chay Mack RN  Outcome: Progressing  9/11/2023 1120 by John Cruz RN  Outcome: Progressing  Flowsheets (Taken 9/11/2023 0830)  Care Plan - Patient's Chronic Conditions and Co-Morbidity Symptoms are Monitored and Maintained or Improved: Monitor and assess patient's chronic conditions and comorbid symptoms for stability, deterioration, or improvement

## 2023-09-12 NOTE — PRE-PROCEDURE INSTRUCTIONS
Interventional Radiology Inpatient Communication       Interventional Radiology has received a consult for a Thoracentesis. We anticipate this procedure will be completed on Wednesday September 13, 2023. Primary Care Nurse: Deny Melendez        Please ensure the following is completed:     Patient is NPO: yes  *Clear liquids Dido@[employer] on 9/13/2023    Blood thinners held: Yes  *HOLD any/all blood thinners    Patient must have working IV: Yes       Patient must be in a hospital gown with snaps and be transported via stretcher to Interventional Radiology. Please send hospital chart and labels. If the patient is unable to provide consent for the procedure, please contact Interventional Radiology at [phone].

## 2023-09-13 ENCOUNTER — APPOINTMENT (OUTPATIENT)
Dept: CT IMAGING | Age: 63
DRG: 177 | End: 2023-09-13
Attending: INTERNAL MEDICINE
Payer: COMMERCIAL

## 2023-09-13 LAB
ANION GAP SERPL CALC-SCNC: 6 MMOL/L (ref 2–11)
APPEARANCE FLD: CLEAR
BACTERIA SPEC CULT: NORMAL
BACTERIA SPEC CULT: NORMAL
BASOPHILS # BLD: 0.1 K/UL (ref 0–0.2)
BASOPHILS NFR BLD: 1 % (ref 0–2)
BUN SERPL-MCNC: 24 MG/DL (ref 8–23)
CALCIUM SERPL-MCNC: 9.2 MG/DL (ref 8.3–10.4)
CHLORIDE SERPL-SCNC: 107 MMOL/L (ref 101–110)
CO2 SERPL-SCNC: 23 MMOL/L (ref 21–32)
COLOR FLD: YELLOW
CREAT SERPL-MCNC: 1.4 MG/DL (ref 0.8–1.5)
DIFFERENTIAL METHOD BLD: ABNORMAL
EOSINOPHIL # BLD: 0.2 K/UL (ref 0–0.8)
EOSINOPHIL NFR BLD: 2 % (ref 0.5–7.8)
ERYTHROCYTE [DISTWIDTH] IN BLOOD BY AUTOMATED COUNT: 15.6 % (ref 11.9–14.6)
GLUCOSE BLD STRIP.AUTO-MCNC: 136 MG/DL (ref 65–100)
GLUCOSE BLD STRIP.AUTO-MCNC: 145 MG/DL (ref 65–100)
GLUCOSE BLD STRIP.AUTO-MCNC: 176 MG/DL (ref 65–100)
GLUCOSE BLD STRIP.AUTO-MCNC: 233 MG/DL (ref 65–100)
GLUCOSE FLD-MCNC: 24 MG/DL
GLUCOSE SERPL-MCNC: 134 MG/DL (ref 65–100)
HCT VFR BLD AUTO: 33 % (ref 41.1–50.3)
HGB BLD-MCNC: 10.5 G/DL (ref 13.6–17.2)
IMM GRANULOCYTES # BLD AUTO: 0.1 K/UL (ref 0–0.5)
IMM GRANULOCYTES NFR BLD AUTO: 1 % (ref 0–5)
LDH FLD L TO P-CCNC: 675 U/L
LYMPHOCYTES # BLD: 1.6 K/UL (ref 0.5–4.6)
LYMPHOCYTES NFR BLD: 18 % (ref 13–44)
LYMPHOCYTES NFR BRONCH MANUAL: 1 %
MACROPHAGES NFR BRONCH MANUAL: 12 %
MCH RBC QN AUTO: 23.1 PG (ref 26.1–32.9)
MCHC RBC AUTO-ENTMCNC: 31.8 G/DL (ref 31.4–35)
MCV RBC AUTO: 72.5 FL (ref 82–102)
MONOCYTES # BLD: 1.1 K/UL (ref 0.1–1.3)
MONOCYTES NFR BLD: 12 % (ref 4–12)
NEUTROPHILS NFR BRONCH MANUAL: 87 %
NEUTS SEG # BLD: 6 K/UL (ref 1.7–8.2)
NEUTS SEG NFR BLD: 66 % (ref 43–78)
NRBC # BLD: 0 K/UL (ref 0–0.2)
NUC CELL # FLD: 296 /CU MM
PLATELET # BLD AUTO: 276 K/UL (ref 150–450)
PMV BLD AUTO: 9.1 FL (ref 9.4–12.3)
POTASSIUM SERPL-SCNC: 4.2 MMOL/L (ref 3.5–5.1)
PROT FLD-MCNC: 5.3 G/DL
RBC # BLD AUTO: 4.55 M/UL (ref 4.23–5.6)
RBC # FLD: 3000 /CU MM
SERVICE CMNT-IMP: ABNORMAL
SERVICE CMNT-IMP: NORMAL
SERVICE CMNT-IMP: NORMAL
SODIUM SERPL-SCNC: 136 MMOL/L (ref 133–143)
SPECIMEN SOURCE FLD: NORMAL
WBC # BLD AUTO: 8.9 K/UL (ref 4.3–11.1)

## 2023-09-13 PROCEDURE — 99232 SBSQ HOSP IP/OBS MODERATE 35: CPT | Performed by: INTERNAL MEDICINE

## 2023-09-13 PROCEDURE — 89050 BODY FLUID CELL COUNT: CPT

## 2023-09-13 PROCEDURE — 85025 COMPLETE CBC W/AUTO DIFF WBC: CPT

## 2023-09-13 PROCEDURE — 87206 SMEAR FLUORESCENT/ACID STAI: CPT

## 2023-09-13 PROCEDURE — 2580000003 HC RX 258: Performed by: INTERNAL MEDICINE

## 2023-09-13 PROCEDURE — 88112 CYTOPATH CELL ENHANCE TECH: CPT

## 2023-09-13 PROCEDURE — 0W9B3ZX DRAINAGE OF LEFT PLEURAL CAVITY, PERCUTANEOUS APPROACH, DIAGNOSTIC: ICD-10-PCS | Performed by: RADIOLOGY

## 2023-09-13 PROCEDURE — 36415 COLL VENOUS BLD VENIPUNCTURE: CPT

## 2023-09-13 PROCEDURE — 1100000000 HC RM PRIVATE

## 2023-09-13 PROCEDURE — 87070 CULTURE OTHR SPECIMN AEROBIC: CPT

## 2023-09-13 PROCEDURE — 82962 GLUCOSE BLOOD TEST: CPT

## 2023-09-13 PROCEDURE — 83615 LACTATE (LD) (LDH) ENZYME: CPT

## 2023-09-13 PROCEDURE — 80048 BASIC METABOLIC PNL TOTAL CA: CPT

## 2023-09-13 PROCEDURE — 6360000002 HC RX W HCPCS: Performed by: RADIOLOGY

## 2023-09-13 PROCEDURE — 2580000003 HC RX 258: Performed by: RADIOLOGY

## 2023-09-13 PROCEDURE — 84157 ASSAY OF PROTEIN OTHER: CPT

## 2023-09-13 PROCEDURE — 87102 FUNGUS ISOLATION CULTURE: CPT

## 2023-09-13 PROCEDURE — 87116 MYCOBACTERIA CULTURE: CPT

## 2023-09-13 PROCEDURE — 82945 GLUCOSE OTHER FLUID: CPT

## 2023-09-13 PROCEDURE — 88305 TISSUE EXAM BY PATHOLOGIST: CPT

## 2023-09-13 PROCEDURE — 87205 SMEAR GRAM STAIN: CPT

## 2023-09-13 PROCEDURE — 6370000000 HC RX 637 (ALT 250 FOR IP): Performed by: INTERNAL MEDICINE

## 2023-09-13 PROCEDURE — C1729 CATH, DRAINAGE: HCPCS

## 2023-09-13 PROCEDURE — 2500000003 HC RX 250 WO HCPCS: Performed by: RADIOLOGY

## 2023-09-13 PROCEDURE — 6370000000 HC RX 637 (ALT 250 FOR IP): Performed by: STUDENT IN AN ORGANIZED HEALTH CARE EDUCATION/TRAINING PROGRAM

## 2023-09-13 RX ORDER — MIDAZOLAM HYDROCHLORIDE 1 MG/ML
INJECTION INTRAMUSCULAR; INTRAVENOUS PRN
Status: COMPLETED | OUTPATIENT
Start: 2023-09-13 | End: 2023-09-13

## 2023-09-13 RX ORDER — FENTANYL CITRATE 50 UG/ML
INJECTION, SOLUTION INTRAMUSCULAR; INTRAVENOUS PRN
Status: COMPLETED | OUTPATIENT
Start: 2023-09-13 | End: 2023-09-13

## 2023-09-13 RX ORDER — LIDOCAINE HYDROCHLORIDE 20 MG/ML
INJECTION, SOLUTION INFILTRATION; PERINEURAL PRN
Status: COMPLETED | OUTPATIENT
Start: 2023-09-13 | End: 2023-09-13

## 2023-09-13 RX ORDER — SODIUM CHLORIDE 9 MG/ML
INJECTION, SOLUTION INTRAVENOUS CONTINUOUS PRN
Status: COMPLETED | OUTPATIENT
Start: 2023-09-13 | End: 2023-09-13

## 2023-09-13 RX ADMIN — INSULIN LISPRO 3 UNITS: 100 INJECTION, SOLUTION INTRAVENOUS; SUBCUTANEOUS at 17:15

## 2023-09-13 RX ADMIN — FENTANYL CITRATE 25 MCG: 50 INJECTION, SOLUTION INTRAMUSCULAR; INTRAVENOUS at 10:39

## 2023-09-13 RX ADMIN — MIDAZOLAM 1 MG: 1 INJECTION INTRAMUSCULAR; INTRAVENOUS at 10:33

## 2023-09-13 RX ADMIN — LIDOCAINE HYDROCHLORIDE 10 ML: 20 INJECTION, SOLUTION INFILTRATION; PERINEURAL at 10:43

## 2023-09-13 RX ADMIN — MIDAZOLAM 0.5 MG: 1 INJECTION INTRAMUSCULAR; INTRAVENOUS at 10:39

## 2023-09-13 RX ADMIN — SODIUM CHLORIDE 50 ML/HR: 9 INJECTION, SOLUTION INTRAVENOUS at 10:33

## 2023-09-13 RX ADMIN — ATORVASTATIN CALCIUM 20 MG: 20 TABLET, FILM COATED ORAL at 21:33

## 2023-09-13 RX ADMIN — FERROUS SULFATE TAB 325 MG (65 MG ELEMENTAL FE) 325 MG: 325 (65 FE) TAB at 13:16

## 2023-09-13 RX ADMIN — FENTANYL CITRATE 50 MCG: 50 INJECTION, SOLUTION INTRAMUSCULAR; INTRAVENOUS at 10:33

## 2023-09-13 RX ADMIN — SODIUM CHLORIDE, PRESERVATIVE FREE 10 ML: 5 INJECTION INTRAVENOUS at 21:58

## 2023-09-13 RX ADMIN — GUAIFENESIN 1200 MG: 600 TABLET ORAL at 13:16

## 2023-09-13 RX ADMIN — GUAIFENESIN 1200 MG: 600 TABLET ORAL at 21:33

## 2023-09-13 ASSESSMENT — PAIN SCALES - GENERAL: PAINLEVEL_OUTOF10: 0

## 2023-09-13 ASSESSMENT — PAIN - FUNCTIONAL ASSESSMENT: PAIN_FUNCTIONAL_ASSESSMENT: NONE - DENIES PAIN

## 2023-09-13 NOTE — BRIEF OP NOTE
Department of Interventional Radiology  [phone]        Interventional Radiology Brief Procedure Note    Patient: Em Moreira MRN: [id number]  SSN: [id number]    YOB: 1960  Age: 66 y.o. Sex: male      Date of Procedure: 9/13/2023    Pre-Procedure Diagnosis: L Empyema. Hx of Tb. Post-Procedure Diagnosis: SAME    Procedure(s):  Image Guided Aspiration. Brief Description of Procedure: as abovde    Performed By: Bronson Yoon MD     Assistants: None    Anesthesia:Moderate Sedation    Estimated Blood Loss: Less than 10ml    Specimens:  Microbiology    Implants:  None    Findings: 25 cc thin yellow fluid aspirated from a thick walled empyema.       Complications: None    Recommendations: 1 hour bedrest.      Follow Up: PRN    Signed By: Bronson Yoon MD     September 13, 2023

## 2023-09-14 LAB
ACID FAST STN SPEC: NEGATIVE
CYTOLOGY-NON GYN: NORMAL
GLUCOSE BLD STRIP.AUTO-MCNC: 154 MG/DL (ref 65–100)
GLUCOSE BLD STRIP.AUTO-MCNC: 174 MG/DL (ref 65–100)
GLUCOSE BLD STRIP.AUTO-MCNC: 178 MG/DL (ref 65–100)
GLUCOSE BLD STRIP.AUTO-MCNC: 205 MG/DL (ref 65–100)
M TB IFN-G BLD-IMP: NORMAL
MYCOBACTERIUM SPEC QL CULT: NORMAL
SERVICE CMNT-IMP: ABNORMAL
SPECIMEN PREPARATION: NORMAL
SPECIMEN SOURCE: NORMAL
SPECIMEN SOURCE: NORMAL

## 2023-09-14 PROCEDURE — 6370000000 HC RX 637 (ALT 250 FOR IP): Performed by: INTERNAL MEDICINE

## 2023-09-14 PROCEDURE — 99232 SBSQ HOSP IP/OBS MODERATE 35: CPT | Performed by: INTERNAL MEDICINE

## 2023-09-14 PROCEDURE — 2580000003 HC RX 258: Performed by: INTERNAL MEDICINE

## 2023-09-14 PROCEDURE — 82962 GLUCOSE BLOOD TEST: CPT

## 2023-09-14 PROCEDURE — 6370000000 HC RX 637 (ALT 250 FOR IP): Performed by: STUDENT IN AN ORGANIZED HEALTH CARE EDUCATION/TRAINING PROGRAM

## 2023-09-14 PROCEDURE — 1100000000 HC RM PRIVATE

## 2023-09-14 RX ORDER — RIFAMPIN 300 MG/1
600 CAPSULE ORAL DAILY
Status: DISCONTINUED | OUTPATIENT
Start: 2023-09-14 | End: 2023-09-18 | Stop reason: HOSPADM

## 2023-09-14 RX ORDER — ISONIAZID 100 MG/1
300 TABLET ORAL DAILY
Status: DISCONTINUED | OUTPATIENT
Start: 2023-09-14 | End: 2023-09-18 | Stop reason: HOSPADM

## 2023-09-14 RX ORDER — PYRAZINAMIDE TABLET 500 MG/1
1500 TABLET ORAL DAILY
Status: DISCONTINUED | OUTPATIENT
Start: 2023-09-14 | End: 2023-09-18 | Stop reason: HOSPADM

## 2023-09-14 RX ORDER — LANOLIN ALCOHOL/MO/W.PET/CERES
50 CREAM (GRAM) TOPICAL DAILY
Status: DISCONTINUED | OUTPATIENT
Start: 2023-09-14 | End: 2023-09-18 | Stop reason: HOSPADM

## 2023-09-14 RX ORDER — ETHAMBUTOL HYDROCHLORIDE 400 MG/1
1.2 TABLET, FILM COATED ORAL DAILY
Status: DISCONTINUED | OUTPATIENT
Start: 2023-09-14 | End: 2023-09-18 | Stop reason: HOSPADM

## 2023-09-14 RX ADMIN — FERROUS SULFATE TAB 325 MG (65 MG ELEMENTAL FE) 325 MG: 325 (65 FE) TAB at 09:21

## 2023-09-14 RX ADMIN — ATORVASTATIN CALCIUM 20 MG: 20 TABLET, FILM COATED ORAL at 21:07

## 2023-09-14 RX ADMIN — SODIUM CHLORIDE, PRESERVATIVE FREE 10 ML: 5 INJECTION INTRAVENOUS at 09:21

## 2023-09-14 RX ADMIN — Medication 50 MG: at 18:34

## 2023-09-14 RX ADMIN — GUAIFENESIN 1200 MG: 600 TABLET ORAL at 09:21

## 2023-09-14 RX ADMIN — ACETAMINOPHEN 650 MG: 325 TABLET ORAL at 21:06

## 2023-09-14 RX ADMIN — GUAIFENESIN 1200 MG: 600 TABLET ORAL at 21:07

## 2023-09-14 RX ADMIN — RIFAMPIN 600 MG: 300 CAPSULE ORAL at 18:34

## 2023-09-14 RX ADMIN — SODIUM CHLORIDE, PRESERVATIVE FREE 5 ML: 5 INJECTION INTRAVENOUS at 21:07

## 2023-09-14 RX ADMIN — ETHAMBUTOL HYDROCHLORIDE 1200 MG: 400 TABLET, FILM COATED ORAL at 18:34

## 2023-09-14 RX ADMIN — INSULIN LISPRO 3 UNITS: 100 INJECTION, SOLUTION INTRAVENOUS; SUBCUTANEOUS at 13:45

## 2023-09-14 RX ADMIN — ISONIAZID 300 MG: 100 TABLET ORAL at 18:34

## 2023-09-14 ASSESSMENT — PAIN SCALES - GENERAL: PAINLEVEL_OUTOF10: 0

## 2023-09-15 PROBLEM — A15.9 TUBERCULOSIS: Status: ACTIVE | Noted: 2023-09-15

## 2023-09-15 LAB
ACID FAST STN SPEC: NEGATIVE
GLUCOSE BLD STRIP.AUTO-MCNC: 143 MG/DL (ref 65–100)
GLUCOSE BLD STRIP.AUTO-MCNC: 145 MG/DL (ref 65–100)
GLUCOSE BLD STRIP.AUTO-MCNC: 153 MG/DL (ref 65–100)
GLUCOSE BLD STRIP.AUTO-MCNC: 189 MG/DL (ref 65–100)
MYCOBACTERIUM SPEC QL CULT: NORMAL
SERVICE CMNT-IMP: ABNORMAL
SPECIMEN PREPARATION: NORMAL
SPECIMEN SOURCE: NORMAL

## 2023-09-15 PROCEDURE — 2580000003 HC RX 258: Performed by: INTERNAL MEDICINE

## 2023-09-15 PROCEDURE — 82962 GLUCOSE BLOOD TEST: CPT

## 2023-09-15 PROCEDURE — 6370000000 HC RX 637 (ALT 250 FOR IP): Performed by: STUDENT IN AN ORGANIZED HEALTH CARE EDUCATION/TRAINING PROGRAM

## 2023-09-15 PROCEDURE — 6370000000 HC RX 637 (ALT 250 FOR IP): Performed by: INTERNAL MEDICINE

## 2023-09-15 PROCEDURE — 1100000000 HC RM PRIVATE

## 2023-09-15 RX ADMIN — FERROUS SULFATE TAB 325 MG (65 MG ELEMENTAL FE) 325 MG: 325 (65 FE) TAB at 08:54

## 2023-09-15 RX ADMIN — GUAIFENESIN 1200 MG: 600 TABLET ORAL at 08:54

## 2023-09-15 RX ADMIN — ATORVASTATIN CALCIUM 20 MG: 20 TABLET, FILM COATED ORAL at 21:32

## 2023-09-15 RX ADMIN — RIFAMPIN 600 MG: 300 CAPSULE ORAL at 08:54

## 2023-09-15 RX ADMIN — PYRAZINAMIDE 1500 MG: 500 TABLET ORAL at 08:54

## 2023-09-15 RX ADMIN — SODIUM CHLORIDE, PRESERVATIVE FREE 10 ML: 5 INJECTION INTRAVENOUS at 08:55

## 2023-09-15 RX ADMIN — ACETAMINOPHEN 650 MG: 325 TABLET ORAL at 21:31

## 2023-09-15 RX ADMIN — ISONIAZID 300 MG: 100 TABLET ORAL at 08:53

## 2023-09-15 RX ADMIN — GUAIFENESIN 1200 MG: 600 TABLET ORAL at 21:31

## 2023-09-15 RX ADMIN — ETHAMBUTOL HYDROCHLORIDE 1200 MG: 400 TABLET, FILM COATED ORAL at 08:54

## 2023-09-15 RX ADMIN — Medication 50 MG: at 08:54

## 2023-09-15 RX ADMIN — GUAIFENESIN SYRUP AND DEXTROMETHORPHAN 10 ML: 100; 10 SYRUP ORAL at 21:32

## 2023-09-16 LAB
ALBUMIN SERPL-MCNC: 3 G/DL (ref 3.2–4.6)
ALBUMIN/GLOB SERPL: 0.6 (ref 0.4–1.6)
ALP SERPL-CCNC: 102 U/L (ref 50–136)
ALT SERPL-CCNC: 31 U/L (ref 12–65)
ANION GAP SERPL CALC-SCNC: 5 MMOL/L (ref 2–11)
AST SERPL-CCNC: 20 U/L (ref 15–37)
BACTERIA SPEC CULT: NORMAL
BASOPHILS # BLD: 0.1 K/UL (ref 0–0.2)
BASOPHILS NFR BLD: 1 % (ref 0–2)
BILIRUB SERPL-MCNC: 1 MG/DL (ref 0.2–1.1)
BUN SERPL-MCNC: 27 MG/DL (ref 8–23)
CALCIUM SERPL-MCNC: 9.9 MG/DL (ref 8.3–10.4)
CHLORIDE SERPL-SCNC: 105 MMOL/L (ref 101–110)
CO2 SERPL-SCNC: 25 MMOL/L (ref 21–32)
CREAT SERPL-MCNC: 1.5 MG/DL (ref 0.8–1.5)
DIFFERENTIAL METHOD BLD: ABNORMAL
EOSINOPHIL # BLD: 0.2 K/UL (ref 0–0.8)
EOSINOPHIL NFR BLD: 2 % (ref 0.5–7.8)
ERYTHROCYTE [DISTWIDTH] IN BLOOD BY AUTOMATED COUNT: 15.5 % (ref 11.9–14.6)
GLOBULIN SER CALC-MCNC: 4.9 G/DL (ref 2.8–4.5)
GLUCOSE BLD STRIP.AUTO-MCNC: 126 MG/DL (ref 65–100)
GLUCOSE BLD STRIP.AUTO-MCNC: 165 MG/DL (ref 65–100)
GLUCOSE BLD STRIP.AUTO-MCNC: 201 MG/DL (ref 65–100)
GLUCOSE BLD STRIP.AUTO-MCNC: 95 MG/DL (ref 65–100)
GLUCOSE SERPL-MCNC: 119 MG/DL (ref 65–100)
GRAM STN SPEC: NORMAL
GRAM STN SPEC: NORMAL
HCT VFR BLD AUTO: 36.8 % (ref 41.1–50.3)
HGB BLD-MCNC: 11.3 G/DL (ref 13.6–17.2)
IMM GRANULOCYTES # BLD AUTO: 0.2 K/UL (ref 0–0.5)
IMM GRANULOCYTES NFR BLD AUTO: 2 % (ref 0–5)
LYMPHOCYTES # BLD: 2.3 K/UL (ref 0.5–4.6)
LYMPHOCYTES NFR BLD: 23 % (ref 13–44)
MCH RBC QN AUTO: 22.8 PG (ref 26.1–32.9)
MCHC RBC AUTO-ENTMCNC: 30.7 G/DL (ref 31.4–35)
MCV RBC AUTO: 74.2 FL (ref 82–102)
MONOCYTES # BLD: 1 K/UL (ref 0.1–1.3)
MONOCYTES NFR BLD: 10 % (ref 4–12)
NEUTS SEG # BLD: 6.5 K/UL (ref 1.7–8.2)
NEUTS SEG NFR BLD: 62 % (ref 43–78)
NRBC # BLD: 0 K/UL (ref 0–0.2)
PLATELET # BLD AUTO: 353 K/UL (ref 150–450)
PMV BLD AUTO: 9.4 FL (ref 9.4–12.3)
POTASSIUM SERPL-SCNC: 5.1 MMOL/L (ref 3.5–5.1)
PROT SERPL-MCNC: 7.9 G/DL (ref 6.3–8.2)
RBC # BLD AUTO: 4.96 M/UL (ref 4.23–5.6)
SERVICE CMNT-IMP: ABNORMAL
SERVICE CMNT-IMP: NORMAL
SERVICE CMNT-IMP: NORMAL
SODIUM SERPL-SCNC: 135 MMOL/L (ref 133–143)
WBC # BLD AUTO: 10.3 K/UL (ref 4.3–11.1)

## 2023-09-16 PROCEDURE — 6370000000 HC RX 637 (ALT 250 FOR IP): Performed by: STUDENT IN AN ORGANIZED HEALTH CARE EDUCATION/TRAINING PROGRAM

## 2023-09-16 PROCEDURE — 6370000000 HC RX 637 (ALT 250 FOR IP): Performed by: INTERNAL MEDICINE

## 2023-09-16 PROCEDURE — 85025 COMPLETE CBC W/AUTO DIFF WBC: CPT

## 2023-09-16 PROCEDURE — 36415 COLL VENOUS BLD VENIPUNCTURE: CPT

## 2023-09-16 PROCEDURE — 80053 COMPREHEN METABOLIC PANEL: CPT

## 2023-09-16 PROCEDURE — 82962 GLUCOSE BLOOD TEST: CPT

## 2023-09-16 PROCEDURE — 1100000000 HC RM PRIVATE

## 2023-09-16 PROCEDURE — 2580000003 HC RX 258: Performed by: INTERNAL MEDICINE

## 2023-09-16 RX ADMIN — FERROUS SULFATE TAB 325 MG (65 MG ELEMENTAL FE) 325 MG: 325 (65 FE) TAB at 09:02

## 2023-09-16 RX ADMIN — SODIUM CHLORIDE, PRESERVATIVE FREE 10 ML: 5 INJECTION INTRAVENOUS at 09:45

## 2023-09-16 RX ADMIN — Medication 50 MG: at 09:02

## 2023-09-16 RX ADMIN — ATORVASTATIN CALCIUM 20 MG: 20 TABLET, FILM COATED ORAL at 21:44

## 2023-09-16 RX ADMIN — GUAIFENESIN SYRUP AND DEXTROMETHORPHAN 10 ML: 100; 10 SYRUP ORAL at 21:43

## 2023-09-16 RX ADMIN — GUAIFENESIN SYRUP AND DEXTROMETHORPHAN 10 ML: 100; 10 SYRUP ORAL at 09:01

## 2023-09-16 RX ADMIN — GUAIFENESIN 1200 MG: 600 TABLET ORAL at 21:44

## 2023-09-16 RX ADMIN — INSULIN LISPRO 3 UNITS: 100 INJECTION, SOLUTION INTRAVENOUS; SUBCUTANEOUS at 12:22

## 2023-09-16 RX ADMIN — RIFAMPIN 600 MG: 300 CAPSULE ORAL at 09:02

## 2023-09-16 RX ADMIN — GUAIFENESIN 1200 MG: 600 TABLET ORAL at 09:06

## 2023-09-16 RX ADMIN — ISONIAZID 300 MG: 100 TABLET ORAL at 09:01

## 2023-09-16 RX ADMIN — PYRAZINAMIDE 1500 MG: 500 TABLET ORAL at 09:02

## 2023-09-16 RX ADMIN — ETHAMBUTOL HYDROCHLORIDE 1200 MG: 400 TABLET, FILM COATED ORAL at 09:06

## 2023-09-16 ASSESSMENT — PAIN DESCRIPTION - ORIENTATION: ORIENTATION: RIGHT

## 2023-09-16 ASSESSMENT — PAIN DESCRIPTION - DESCRIPTORS: DESCRIPTORS: DISCOMFORT

## 2023-09-16 ASSESSMENT — PAIN DESCRIPTION - LOCATION: LOCATION: RIB CAGE

## 2023-09-16 ASSESSMENT — PAIN SCALES - GENERAL
PAINLEVEL_OUTOF10: 0
PAINLEVEL_OUTOF10: 2

## 2023-09-16 ASSESSMENT — PAIN DESCRIPTION - PAIN TYPE: TYPE: OTHER (COMMENT)

## 2023-09-16 ASSESSMENT — PAIN - FUNCTIONAL ASSESSMENT: PAIN_FUNCTIONAL_ASSESSMENT: ACTIVITIES ARE NOT PREVENTED

## 2023-09-17 LAB
ALBUMIN SERPL-MCNC: 3.1 G/DL (ref 3.2–4.6)
ALBUMIN/GLOB SERPL: 0.6 (ref 0.4–1.6)
ALP SERPL-CCNC: 110 U/L (ref 50–136)
ALT SERPL-CCNC: 23 U/L (ref 12–65)
ANION GAP SERPL CALC-SCNC: 5 MMOL/L (ref 2–11)
AST SERPL-CCNC: 14 U/L (ref 15–37)
BASOPHILS # BLD: 0.1 K/UL (ref 0–0.2)
BASOPHILS NFR BLD: 1 % (ref 0–2)
BILIRUB SERPL-MCNC: 0.7 MG/DL (ref 0.2–1.1)
BUN SERPL-MCNC: 35 MG/DL (ref 8–23)
CALCIUM SERPL-MCNC: 9.9 MG/DL (ref 8.3–10.4)
CHLORIDE SERPL-SCNC: 106 MMOL/L (ref 101–110)
CO2 SERPL-SCNC: 23 MMOL/L (ref 21–32)
CREAT SERPL-MCNC: 1.6 MG/DL (ref 0.8–1.5)
DIFFERENTIAL METHOD BLD: ABNORMAL
EOSINOPHIL # BLD: 0.2 K/UL (ref 0–0.8)
EOSINOPHIL NFR BLD: 2 % (ref 0.5–7.8)
ERYTHROCYTE [DISTWIDTH] IN BLOOD BY AUTOMATED COUNT: 15.3 % (ref 11.9–14.6)
GLOBULIN SER CALC-MCNC: 5.1 G/DL (ref 2.8–4.5)
GLUCOSE BLD STRIP.AUTO-MCNC: 125 MG/DL (ref 65–100)
GLUCOSE BLD STRIP.AUTO-MCNC: 149 MG/DL (ref 65–100)
GLUCOSE BLD STRIP.AUTO-MCNC: 156 MG/DL (ref 65–100)
GLUCOSE BLD STRIP.AUTO-MCNC: 207 MG/DL (ref 65–100)
GLUCOSE SERPL-MCNC: 120 MG/DL (ref 65–100)
HCT VFR BLD AUTO: 35.3 % (ref 41.1–50.3)
HGB BLD-MCNC: 10.9 G/DL (ref 13.6–17.2)
IMM GRANULOCYTES # BLD AUTO: 0.3 K/UL (ref 0–0.5)
IMM GRANULOCYTES NFR BLD AUTO: 2 % (ref 0–5)
LYMPHOCYTES # BLD: 2.1 K/UL (ref 0.5–4.6)
LYMPHOCYTES NFR BLD: 18 % (ref 13–44)
MCH RBC QN AUTO: 22.9 PG (ref 26.1–32.9)
MCHC RBC AUTO-ENTMCNC: 30.9 G/DL (ref 31.4–35)
MCV RBC AUTO: 74.3 FL (ref 82–102)
MONOCYTES # BLD: 1 K/UL (ref 0.1–1.3)
MONOCYTES NFR BLD: 9 % (ref 4–12)
NEUTS SEG # BLD: 7.5 K/UL (ref 1.7–8.2)
NEUTS SEG NFR BLD: 68 % (ref 43–78)
NRBC # BLD: 0 K/UL (ref 0–0.2)
PLATELET # BLD AUTO: 405 K/UL (ref 150–450)
PMV BLD AUTO: 9.2 FL (ref 9.4–12.3)
POTASSIUM SERPL-SCNC: 5.4 MMOL/L (ref 3.5–5.1)
PROT SERPL-MCNC: 8.2 G/DL (ref 6.3–8.2)
RBC # BLD AUTO: 4.75 M/UL (ref 4.23–5.6)
SERVICE CMNT-IMP: ABNORMAL
SODIUM SERPL-SCNC: 134 MMOL/L (ref 133–143)
WBC # BLD AUTO: 11.2 K/UL (ref 4.3–11.1)

## 2023-09-17 PROCEDURE — 2580000003 HC RX 258: Performed by: INTERNAL MEDICINE

## 2023-09-17 PROCEDURE — 36415 COLL VENOUS BLD VENIPUNCTURE: CPT

## 2023-09-17 PROCEDURE — 6370000000 HC RX 637 (ALT 250 FOR IP): Performed by: INTERNAL MEDICINE

## 2023-09-17 PROCEDURE — 80053 COMPREHEN METABOLIC PANEL: CPT

## 2023-09-17 PROCEDURE — 85025 COMPLETE CBC W/AUTO DIFF WBC: CPT

## 2023-09-17 PROCEDURE — 1100000000 HC RM PRIVATE

## 2023-09-17 PROCEDURE — 6370000000 HC RX 637 (ALT 250 FOR IP): Performed by: STUDENT IN AN ORGANIZED HEALTH CARE EDUCATION/TRAINING PROGRAM

## 2023-09-17 PROCEDURE — 82962 GLUCOSE BLOOD TEST: CPT

## 2023-09-17 RX ORDER — SODIUM CHLORIDE 9 MG/ML
INJECTION, SOLUTION INTRAVENOUS CONTINUOUS
Status: DISCONTINUED | OUTPATIENT
Start: 2023-09-17 | End: 2023-09-18

## 2023-09-17 RX ADMIN — ETHAMBUTOL HYDROCHLORIDE 1200 MG: 400 TABLET, FILM COATED ORAL at 09:56

## 2023-09-17 RX ADMIN — SODIUM CHLORIDE: 9 INJECTION, SOLUTION INTRAVENOUS at 10:10

## 2023-09-17 RX ADMIN — Medication 50 MG: at 09:55

## 2023-09-17 RX ADMIN — ATORVASTATIN CALCIUM 20 MG: 20 TABLET, FILM COATED ORAL at 21:45

## 2023-09-17 RX ADMIN — PYRAZINAMIDE 1500 MG: 500 TABLET ORAL at 09:57

## 2023-09-17 RX ADMIN — SODIUM CHLORIDE, PRESERVATIVE FREE 10 ML: 5 INJECTION INTRAVENOUS at 09:58

## 2023-09-17 RX ADMIN — GUAIFENESIN 1200 MG: 600 TABLET ORAL at 09:56

## 2023-09-17 RX ADMIN — SODIUM CHLORIDE: 9 INJECTION, SOLUTION INTRAVENOUS at 21:45

## 2023-09-17 RX ADMIN — RIFAMPIN 600 MG: 300 CAPSULE ORAL at 09:56

## 2023-09-17 RX ADMIN — GUAIFENESIN SYRUP AND DEXTROMETHORPHAN 10 ML: 100; 10 SYRUP ORAL at 21:46

## 2023-09-17 RX ADMIN — SODIUM ZIRCONIUM CYCLOSILICATE 10 G: 10 POWDER, FOR SUSPENSION ORAL at 22:14

## 2023-09-17 RX ADMIN — GUAIFENESIN 1200 MG: 600 TABLET ORAL at 21:45

## 2023-09-17 RX ADMIN — ISONIAZID 300 MG: 100 TABLET ORAL at 09:57

## 2023-09-17 RX ADMIN — FERROUS SULFATE TAB 325 MG (65 MG ELEMENTAL FE) 325 MG: 325 (65 FE) TAB at 09:57

## 2023-09-17 NOTE — PLAN OF CARE
Problem: Discharge Planning  Goal: Discharge to home or other facility with appropriate resources  Outcome: Progressing  Flowsheets (Taken 9/17/2023 0815)  Discharge to home or other facility with appropriate resources: Identify barriers to discharge with patient and caregiver     Problem: Pain  Goal: Verbalizes/displays adequate comfort level or baseline comfort level  Outcome: Progressing     Problem: Chronic Conditions and Co-morbidities  Goal: Patient's chronic conditions and co-morbidity symptoms are monitored and maintained or improved  Outcome: Progressing  Flowsheets (Taken 9/17/2023 0815)  Care Plan - Patient's Chronic Conditions and Co-Morbidity Symptoms are Monitored and Maintained or Improved: Monitor and assess patient's chronic conditions and comorbid symptoms for stability, deterioration, or improvement

## 2023-09-18 VITALS
BODY MASS INDEX: 22.33 KG/M2 | RESPIRATION RATE: 19 BRPM | OXYGEN SATURATION: 98 % | WEIGHT: 156 LBS | SYSTOLIC BLOOD PRESSURE: 134 MMHG | TEMPERATURE: 98.2 F | HEIGHT: 70 IN | HEART RATE: 117 BPM | DIASTOLIC BLOOD PRESSURE: 85 MMHG

## 2023-09-18 LAB
ALBUMIN SERPL-MCNC: 2.8 G/DL (ref 3.2–4.6)
ALBUMIN/GLOB SERPL: 0.6 (ref 0.4–1.6)
ALP SERPL-CCNC: 95 U/L (ref 50–136)
ALT SERPL-CCNC: 14 U/L (ref 12–65)
ANION GAP SERPL CALC-SCNC: 6 MMOL/L (ref 2–11)
AST SERPL-CCNC: 14 U/L (ref 15–37)
BASOPHILS # BLD: 0.1 K/UL (ref 0–0.2)
BASOPHILS NFR BLD: 1 % (ref 0–2)
BILIRUB SERPL-MCNC: 0.5 MG/DL (ref 0.2–1.1)
BUN SERPL-MCNC: 29 MG/DL (ref 8–23)
CALCIUM SERPL-MCNC: 9.3 MG/DL (ref 8.3–10.4)
CHLORIDE SERPL-SCNC: 112 MMOL/L (ref 101–110)
CO2 SERPL-SCNC: 22 MMOL/L (ref 21–32)
CREAT SERPL-MCNC: 1.3 MG/DL (ref 0.8–1.5)
DIFFERENTIAL METHOD BLD: ABNORMAL
EOSINOPHIL # BLD: 0.2 K/UL (ref 0–0.8)
EOSINOPHIL NFR BLD: 2 % (ref 0.5–7.8)
ERYTHROCYTE [DISTWIDTH] IN BLOOD BY AUTOMATED COUNT: 15.5 % (ref 11.9–14.6)
FUNGAL CULT/SMEAR: NORMAL
FUNGUS SMEAR: NORMAL
GLOBULIN SER CALC-MCNC: 4.5 G/DL (ref 2.8–4.5)
GLUCOSE BLD STRIP.AUTO-MCNC: 115 MG/DL (ref 65–100)
GLUCOSE BLD STRIP.AUTO-MCNC: 236 MG/DL (ref 65–100)
GLUCOSE SERPL-MCNC: 112 MG/DL (ref 65–100)
HCT VFR BLD AUTO: 31.1 % (ref 41.1–50.3)
HGB BLD-MCNC: 9.7 G/DL (ref 13.6–17.2)
IMM GRANULOCYTES # BLD AUTO: 0.3 K/UL (ref 0–0.5)
IMM GRANULOCYTES NFR BLD AUTO: 3 % (ref 0–5)
LYMPHOCYTES # BLD: 1.6 K/UL (ref 0.5–4.6)
LYMPHOCYTES NFR BLD: 17 % (ref 13–44)
MCH RBC QN AUTO: 23.4 PG (ref 26.1–32.9)
MCHC RBC AUTO-ENTMCNC: 31.2 G/DL (ref 31.4–35)
MCV RBC AUTO: 74.9 FL (ref 82–102)
MONOCYTES # BLD: 0.9 K/UL (ref 0.1–1.3)
MONOCYTES NFR BLD: 10 % (ref 4–12)
NEUTS SEG # BLD: 6.2 K/UL (ref 1.7–8.2)
NEUTS SEG NFR BLD: 67 % (ref 43–78)
NRBC # BLD: 0 K/UL (ref 0–0.2)
PLATELET # BLD AUTO: 406 K/UL (ref 150–450)
PMV BLD AUTO: 9.6 FL (ref 9.4–12.3)
POTASSIUM SERPL-SCNC: 4.7 MMOL/L (ref 3.5–5.1)
PROT SERPL-MCNC: 7.3 G/DL (ref 6.3–8.2)
RBC # BLD AUTO: 4.15 M/UL (ref 4.23–5.6)
SERVICE CMNT-IMP: ABNORMAL
SERVICE CMNT-IMP: ABNORMAL
SODIUM SERPL-SCNC: 140 MMOL/L (ref 133–143)
SPECIMEN PROCESSING: NORMAL
SPECIMEN SOURCE: NORMAL
SPECIMEN SOURCE: NORMAL
WBC # BLD AUTO: 9.2 K/UL (ref 4.3–11.1)

## 2023-09-18 PROCEDURE — 85025 COMPLETE CBC W/AUTO DIFF WBC: CPT

## 2023-09-18 PROCEDURE — 82962 GLUCOSE BLOOD TEST: CPT

## 2023-09-18 PROCEDURE — 6370000000 HC RX 637 (ALT 250 FOR IP): Performed by: STUDENT IN AN ORGANIZED HEALTH CARE EDUCATION/TRAINING PROGRAM

## 2023-09-18 PROCEDURE — 36415 COLL VENOUS BLD VENIPUNCTURE: CPT

## 2023-09-18 PROCEDURE — 6370000000 HC RX 637 (ALT 250 FOR IP): Performed by: INTERNAL MEDICINE

## 2023-09-18 PROCEDURE — 80053 COMPREHEN METABOLIC PANEL: CPT

## 2023-09-18 RX ORDER — FERROUS SULFATE 325(65) MG
325 TABLET ORAL
Qty: 30 TABLET | Refills: 3 | Status: SHIPPED | OUTPATIENT
Start: 2023-09-19

## 2023-09-18 RX ORDER — NAPROXEN 500 MG/1
500 TABLET ORAL 2 TIMES DAILY PRN
Qty: 60 TABLET | Refills: 3 | Status: SHIPPED
Start: 2023-09-18

## 2023-09-18 RX ADMIN — GUAIFENESIN 1200 MG: 600 TABLET ORAL at 07:48

## 2023-09-18 RX ADMIN — Medication 50 MG: at 07:48

## 2023-09-18 RX ADMIN — RIFAMPIN 600 MG: 300 CAPSULE ORAL at 07:48

## 2023-09-18 RX ADMIN — FERROUS SULFATE TAB 325 MG (65 MG ELEMENTAL FE) 325 MG: 325 (65 FE) TAB at 07:48

## 2023-09-18 RX ADMIN — ISONIAZID 300 MG: 100 TABLET ORAL at 07:47

## 2023-09-18 RX ADMIN — ETHAMBUTOL HYDROCHLORIDE 1200 MG: 400 TABLET, FILM COATED ORAL at 07:47

## 2023-09-18 RX ADMIN — PYRAZINAMIDE 1500 MG: 500 TABLET ORAL at 11:18

## 2023-09-18 NOTE — CARE COORDINATION
Patient is scheduled for d/c today 9.18.2023. No needs voiced - medical milestones met. Patient plans to go home with family. Patient is agreeable to d/c plan.        09/18/23 1046   Service Assessment   Patient Orientation Alert and Oriented   Cognition Alert   History Provided By Patient   Primary Caregiver Self   Support Systems Spouse/Significant Other   Patient's Healthcare Decision Maker is: Legal Next of Kin   PCP Verified by CM Yes   Last Visit to PCP Within last 6 months   Prior Functional Level Independent in ADLs/IADLs   Current Functional Level Independent in ADLs/IADLs   Can patient return to prior living arrangement Yes   Ability to make needs known: Good   Family able to assist with home care needs: Yes   Would you like for me to discuss the discharge plan with any other family members/significant others, and if so, who? No   Services At/After Discharge   Confirm Follow Up Transport Family   Condition of Participation: Discharge Planning   The Patient and/or Patient Representative was provided with a Choice of Provider? Patient   The Patient and/Or Patient Representative agree with the Discharge Plan? Yes   Freedom of Choice list was provided with basic dialogue that supports the patient's individualized plan of care/goals, treatment preferences, and shares the quality data associated with the providers?   Yes

## 2023-09-18 NOTE — DISCHARGE SUMMARY
and/or kF according to patient size (this includes techniques or standardized protocols for targeted exams where dose is matched to indication/reason for exam); and Use of Iterative Reconstruction Technique. PROCEDURE SUMMARY: - Percutaneous CT-guided Large Needle Aspiration PROCEDURE DETAILS: Pre-procedure Reference imaging for biopsy target: CT 9/8/2023 and earlier Consent:  Informed written and oral consent for the procedure was obtained after explanation of risks (including, but not limitted to:  hemorrhage, infection, visceral injury, nondiagnostic biopsy) benefits and alternatives. The patient's questions were answered to their satisfaction. They stated understanding and requested that we proceed. Final verification:  A time-out identifying the patient and planned procedure was performed prior to this procedure. Preparation: (MIPS) Maximal sterile barrier technique (including:  cap, mask, sterile gown, sterile gloves, sterile sheet, hand hygiene, and cutaneous antisepsis) was used. Anesthesia/sedation Level of anesthesia/sedation: Moderate sedation (conscious sedation) Anesthesia/sedation administered by: Independent trained observer under attending supervision with continuous monitoring of the patient?s level of consciousness and physiologic status Total intra-service sedation time (minutes): 15 Imaging prior to biopsy The patient was positioned supine oblique. Initial imaging was performed using noncontrast CT. Aspiration target: - Maximal diameter (cm): 1.4 - Location: Left pleural space adjacent to the lower lobe Other findings: Multiple pulmonary densities scattered throughout both lungs consistent with multifocal pneumonia. Biopsy Local anesthesia was administered. Under CT guidance, the coaxial Yueh needle system was advanced to the thick walled empyema/effusion and Aspiration was performed returning 25 cc of thin, yellow, fluid.  Coaxial needle: 6-[place] Yueh needle Needle removal The biopsy needle

## 2023-09-20 NOTE — ADT AUTH CERT
New cavitary mass of the left lung apex 3.9 x 4.3 cm   -unclear what this represents.    -Will consult pulmonary and ID  -given his stability and well-appearing look, hold off on abx for now  -check sputum cx, AFB, quantiferon gold, blood cultures, PCT  -ordered pleural fluid studies as well including cytology  -Per pulmonology there is lot of tree-in-bud changes on CT,? Reactivation  - Pending AFB x3, if negative may need TBBX  -ID recommendations include HIV testing  -Cryptococcal antigen pending  -Follow-up AFB. Patient has provided x2 sputum samples       Pleural effusion, left, possibly empyema  -pulmonary consulted.    Fluid studies ordered       History of TB (tuberculosis)  -noted, s/p treatment  -ID following       Hypertension  -cont home lisinopril  -PRN hydralazine/clonidine       HLD (hyperlipidemia)  -cont home statin       Type 2 diabetes mellitus, without long-term current use of insulin (HCC)  -ISS  -recheck a1c 7.1%        MEDICATIONS:  Mucinex 1200 mg PO bid

## 2023-09-21 LAB
Lab: NORMAL
Lab: NORMAL
REFERENCE LAB: NORMAL

## 2023-09-28 LAB
ACID FAST STN SPEC: ABNORMAL
MYCOBACTERIUM SPEC QL CULT: POSITIVE
SPECIMEN PREPARATION: ABNORMAL
SPECIMEN SOURCE: ABNORMAL

## 2023-09-29 LAB
M AVIUM CMPLX RRNA SPEC QL PROBE: NEGATIVE
M GORDONAE RRNA SPEC QL PROBE: ABNORMAL
M KANSASII RRNA SPEC QL PROBE: ABNORMAL
M TB CMPLX RRNA SPEC QL PROBE: POSITIVE
OTHER: ABNORMAL
SPECIMEN SOURCE: ABNORMAL
SUSCEPT TESTING: ABNORMAL

## 2023-10-03 LAB
ACID FAST STN SPEC: ABNORMAL
M AVIUM CMPLX RRNA SPEC QL PROBE: NEGATIVE
M GORDONAE RRNA SPEC QL PROBE: ABNORMAL
M KANSASII RRNA SPEC QL PROBE: ABNORMAL
M TB CMPLX RRNA SPEC QL PROBE: POSITIVE
MYCOBACTERIUM SPEC QL CULT: POSITIVE
OTHER: ABNORMAL
SPECIMEN PREPARATION: ABNORMAL
SPECIMEN SOURCE: ABNORMAL
SPECIMEN SOURCE: ABNORMAL
SUSCEPT TESTING: ABNORMAL

## 2023-10-12 LAB
ACID FAST STN SPEC: NEGATIVE
FUNGAL CULT/SMEAR: NORMAL
FUNGUS (MYCOLOGY) CULTURE: NORMAL
FUNGUS SMEAR: NORMAL
MYCOBACTERIUM SPEC QL CULT: POSITIVE
REFLEX TO ID: NORMAL
SPECIMEN PREPARATION: ABNORMAL
SPECIMEN PROCESSING: NORMAL
SPECIMEN SOURCE: ABNORMAL
SPECIMEN SOURCE: NORMAL
SPECIMEN SOURCE: NORMAL

## 2023-10-19 LAB
ACID FAST STN SPEC: ABNORMAL
ACID FAST STN SPEC: NEGATIVE
M AVIUM CMPLX RRNA SPEC QL PROBE: NEGATIVE
M AVIUM CMPLX RRNA SPEC QL PROBE: NEGATIVE
M GORDONAE RRNA SPEC QL PROBE: ABNORMAL
M GORDONAE RRNA SPEC QL PROBE: ABNORMAL
M KANSASII RRNA SPEC QL PROBE: ABNORMAL
M KANSASII RRNA SPEC QL PROBE: ABNORMAL
M TB CMPLX RRNA SPEC QL PROBE: POSITIVE
M TB CMPLX RRNA SPEC QL PROBE: POSITIVE
MYCOBACTERIUM SPEC QL CULT: POSITIVE
MYCOBACTERIUM SPEC QL CULT: POSITIVE
OTHER: ABNORMAL
OTHER: ABNORMAL
SPECIMEN PREPARATION: ABNORMAL
SPECIMEN PREPARATION: ABNORMAL
SPECIMEN SOURCE: ABNORMAL
SUSCEPT TESTING: ABNORMAL
SUSCEPT TESTING: ABNORMAL

## 2023-10-28 LAB
ACID FAST STN SPEC: NEGATIVE
MYCOBACTERIUM SPEC QL CULT: NEGATIVE
SPECIMEN PREPARATION: NORMAL
SPECIMEN SOURCE: NORMAL

## 2023-11-03 LAB
ACID FAST STN SPEC: ABNORMAL
ETHAMBUTOL 5 UG/ML ISLT SLOWMYCO: ABNORMAL
ISONIAZID 0.1 UG/ML ISLT SLOWMYCO: ABNORMAL
Lab: ABNORMAL
Lab: NORMAL
Lab: NORMAL
M AVIUM CMPLX RRNA SPEC QL PROBE: NEGATIVE
M GORDONAE RRNA SPEC QL PROBE: ABNORMAL
M KANSASII RRNA SPEC QL PROBE: ABNORMAL
M TB CMPLX RRNA SPEC QL PROBE: POSITIVE
MYCOBACTERIUM ISLT: ABNORMAL
MYCOBACTERIUM SPEC QL CULT: POSITIVE
OTHER: ABNORMAL
PZA 100 UG/ML ISLT SLOWMYCO: ABNORMAL
REFERENCE LAB: NORMAL
RIFAMPIN 1 UG/ML ISLT SLOWMYCO: ABNORMAL
SPECIMEN PREPARATION: ABNORMAL
SPECIMEN SOURCE: ABNORMAL
STREPTOMYCIN 1 UG/ML ISLT SLOWMYCO: ABNORMAL
SUSCEPT TESTING: ABNORMAL

## 2023-11-17 ENCOUNTER — APPOINTMENT (OUTPATIENT)
Dept: GENERAL RADIOLOGY | Age: 63
End: 2023-11-17
Payer: COMMERCIAL

## 2023-11-17 ENCOUNTER — HOSPITAL ENCOUNTER (EMERGENCY)
Age: 63
Discharge: HOME | End: 2023-11-17
Attending: EMERGENCY MEDICINE
Payer: COMMERCIAL

## 2023-11-17 VITALS
RESPIRATION RATE: 18 BRPM | DIASTOLIC BLOOD PRESSURE: 80 MMHG | TEMPERATURE: 97.9 F | SYSTOLIC BLOOD PRESSURE: 132 MMHG | HEIGHT: 70 IN | HEART RATE: 92 BPM | OXYGEN SATURATION: 100 % | WEIGHT: 145 LBS | BODY MASS INDEX: 20.76 KG/M2

## 2023-11-17 DIAGNOSIS — A15.9 TUBERCULOSIS: ICD-10-CM

## 2023-11-17 DIAGNOSIS — D64.9 ANEMIA, UNSPECIFIED TYPE: Primary | ICD-10-CM

## 2023-11-17 DIAGNOSIS — J90 PLEURAL EFFUSION: ICD-10-CM

## 2023-11-17 LAB
ALBUMIN SERPL-MCNC: 2.8 G/DL (ref 3.2–4.6)
ALBUMIN/GLOB SERPL: 0.6 (ref 0.4–1.6)
ALP SERPL-CCNC: 94 U/L (ref 50–136)
ALT SERPL-CCNC: 22 U/L (ref 12–65)
ANION GAP SERPL CALC-SCNC: 4 MMOL/L (ref 2–11)
AST SERPL-CCNC: 34 U/L (ref 15–37)
BASOPHILS # BLD: 0 K/UL (ref 0–0.2)
BASOPHILS NFR BLD: 0 % (ref 0–2)
BILIRUB SERPL-MCNC: 0.5 MG/DL (ref 0.2–1.1)
BUN SERPL-MCNC: 15 MG/DL (ref 8–23)
CALCIUM SERPL-MCNC: 9.3 MG/DL (ref 8.3–10.4)
CHLORIDE SERPL-SCNC: 109 MMOL/L (ref 101–110)
CO2 SERPL-SCNC: 23 MMOL/L (ref 21–32)
CREAT SERPL-MCNC: 1.1 MG/DL (ref 0.8–1.5)
DIFFERENTIAL METHOD BLD: ABNORMAL
EOSINOPHIL # BLD: 0.1 K/UL (ref 0–0.8)
EOSINOPHIL NFR BLD: 1 % (ref 0.5–7.8)
ERYTHROCYTE [DISTWIDTH] IN BLOOD BY AUTOMATED COUNT: 17.1 % (ref 11.9–14.6)
GLOBULIN SER CALC-MCNC: 4.9 G/DL (ref 2.8–4.5)
GLUCOSE SERPL-MCNC: 104 MG/DL (ref 65–100)
HCT VFR BLD AUTO: 27.9 % (ref 41.1–50.3)
HGB BLD-MCNC: 8.6 G/DL (ref 13.6–17.2)
IMM GRANULOCYTES # BLD AUTO: 0.1 K/UL (ref 0–0.5)
IMM GRANULOCYTES NFR BLD AUTO: 1 % (ref 0–5)
LDH SERPL L TO P-CCNC: 123 U/L (ref 110–210)
LYMPHOCYTES # BLD: 1.3 K/UL (ref 0.5–4.6)
LYMPHOCYTES NFR BLD: 18 % (ref 13–44)
MCH RBC QN AUTO: 24.3 PG (ref 26.1–32.9)
MCHC RBC AUTO-ENTMCNC: 30.8 G/DL (ref 31.4–35)
MCV RBC AUTO: 78.8 FL (ref 82–102)
MONOCYTES # BLD: 0.5 K/UL (ref 0.1–1.3)
MONOCYTES NFR BLD: 7 % (ref 4–12)
NEUTS SEG # BLD: 5.4 K/UL (ref 1.7–8.2)
NEUTS SEG NFR BLD: 73 % (ref 43–78)
NRBC # BLD: 0 K/UL (ref 0–0.2)
PLATELET # BLD AUTO: 372 K/UL (ref 150–450)
PMV BLD AUTO: 8.6 FL (ref 9.4–12.3)
POTASSIUM SERPL-SCNC: 3.9 MMOL/L (ref 3.5–5.1)
PROT SERPL-MCNC: 7.7 G/DL (ref 6.3–8.2)
RBC # BLD AUTO: 3.54 M/UL (ref 4.23–5.6)
SODIUM SERPL-SCNC: 136 MMOL/L (ref 133–143)
WBC # BLD AUTO: 7.3 K/UL (ref 4.3–11.1)

## 2023-11-17 PROCEDURE — 71045 X-RAY EXAM CHEST 1 VIEW: CPT

## 2023-11-17 PROCEDURE — 80053 COMPREHEN METABOLIC PANEL: CPT

## 2023-11-17 PROCEDURE — 83615 LACTATE (LD) (LDH) ENZYME: CPT

## 2023-11-17 PROCEDURE — 99284 EMERGENCY DEPT VISIT MOD MDM: CPT

## 2023-11-17 PROCEDURE — 83010 ASSAY OF HAPTOGLOBIN QUANT: CPT

## 2023-11-17 PROCEDURE — 85025 COMPLETE CBC W/AUTO DIFF WBC: CPT

## 2023-11-17 ASSESSMENT — PAIN - FUNCTIONAL ASSESSMENT
PAIN_FUNCTIONAL_ASSESSMENT: NONE - DENIES PAIN
PAIN_FUNCTIONAL_ASSESSMENT: NONE - DENIES PAIN

## 2023-11-17 NOTE — ED PROVIDER NOTES
Emergency Department Provider Note       PCP: Holland Navarro MD   Age: 66 y.o. Sex: male     DISPOSITION Decision To Discharge 11/17/2023 03:24:55 PM       ICD-10-CM    1. Anemia, unspecified type  D64.9 [address] [facility]      2. Tuberculosis  A15.9           Medical Decision Making     Complexity of Problems Addressed:  1 or more chronic illnesses that pose a threat to life or bodily function. Data Reviewed and Analyzed:   I independently ordered and reviewed each unique test.  I reviewed external records: provider visit note from outside specialist.  I reviewed external records: previous lab results from outside ED. I reviewed external records: previous imaging study including radiologist interpretation. I interpreted the X-rays similar to the old xray from 3 years ago. .    Discussion of management or test interpretation. Patient has slowly worsening anemia over months. He is in treatment for tuberculosis. Platelet level is down to normal compared to theres. Hemoglobin is 8.6 and he is Hemoccult negative. LDH level came back normal.  I doubt hemolytic anemia he is also taking a 10-day break from rifampin which may help if is related. He has been prescribed iron for iron deficiency anemia as well. I placed a referral for appointment with hematology. His oxygen saturation is 100%. He had no respiratory complaints today. He does have a order from [facility] that he was supposed to get a chest x-ray so went ahead and did this. Chest x-ray appears similar to old but has significant disease at baseline the pleural effusion may be slightly worse. See radiology's report. I discussed disposition with patient and being admitted to have specialist evaluation of these multiple ongoing problems. At this point he feels okay and prefers to follow-up as an outpatient. Given that the chest x-ray was just ordered as a screening.   I feel that he is safe to be discharged

## 2023-11-17 NOTE — ED TRIAGE NOTES
Patient a 67 y/o Male reports to the ED with c/c of feeling weak and tired. States he is positive for tuberculosis and was hospitalized back in September. States he is having some abdominal bloating and some possible diarrhea.

## 2023-11-17 NOTE — DISCHARGE INSTRUCTIONS
Findings:  Frontal view of the chest was obtained. Region demonstration of masslike consolidation within the left lung apex. The central aeration/cavitation, (as documented on CT of the chest dated September 8, 2023), is not readily appreciated. Loculated left-sided pleural effusion may be mildly enlarged. No pneumothorax. Redemonstration of scattered nodular alveolar radiodensities within the bilateral suprahilar regions. The cardiomediastinal silhouette is within normal limits. There are no acute osseous abnormalities. 1.  Masslike consolidation within left lung apex persists. Central cavitation seen in September 2023 is not readily apparent. 2.  Scattered bilateral suprahilar nodular alveolar infiltrates persist. 3.  Loculated left-sided pleural effusion may have mildly enlarged.

## 2023-11-20 LAB — HAPTOGLOB SERPL-MCNC: 220 MG/DL (ref 30–200)

## 2023-12-07 ENCOUNTER — HOSPITAL ENCOUNTER (OUTPATIENT)
Dept: LAB | Age: 63
Discharge: HOME | End: 2023-12-07
Payer: COMMERCIAL

## 2023-12-07 ENCOUNTER — OFFICE VISIT (OUTPATIENT)
Dept: ONCOLOGY | Age: 63
End: 2023-12-07
Payer: COMMERCIAL

## 2023-12-07 VITALS
SYSTOLIC BLOOD PRESSURE: 178 MMHG | WEIGHT: 154 LBS | BODY MASS INDEX: 22.05 KG/M2 | HEIGHT: 70 IN | HEART RATE: 78 BPM | DIASTOLIC BLOOD PRESSURE: 88 MMHG | OXYGEN SATURATION: 100 % | TEMPERATURE: 98.2 F

## 2023-12-07 DIAGNOSIS — D64.9 ANEMIA, UNSPECIFIED TYPE: Primary | ICD-10-CM

## 2023-12-07 DIAGNOSIS — D64.9 ANEMIA, UNSPECIFIED TYPE: ICD-10-CM

## 2023-12-07 LAB
ALBUMIN SERPL-MCNC: 3.2 G/DL (ref 3.2–4.6)
ALBUMIN/GLOB SERPL: 0.7 (ref 0.4–1.6)
ALP SERPL-CCNC: 107 U/L (ref 50–136)
ALT SERPL-CCNC: 11 U/L (ref 12–65)
ANION GAP SERPL CALC-SCNC: 6 MMOL/L (ref 2–11)
AST SERPL-CCNC: 18 U/L (ref 15–37)
BASOPHILS # BLD: 0 K/UL (ref 0–0.2)
BASOPHILS NFR BLD: 1 % (ref 0–2)
BILIRUB SERPL-MCNC: 0.9 MG/DL (ref 0.2–1.1)
BUN SERPL-MCNC: 7 MG/DL (ref 8–23)
CALCIUM SERPL-MCNC: 8.8 MG/DL (ref 8.3–10.4)
CHLORIDE SERPL-SCNC: 104 MMOL/L (ref 103–113)
CO2 SERPL-SCNC: 29 MMOL/L (ref 21–32)
CREAT SERPL-MCNC: 1 MG/DL (ref 0.8–1.5)
DIFFERENTIAL METHOD BLD: ABNORMAL
EOSINOPHIL # BLD: 0.1 K/UL (ref 0–0.8)
EOSINOPHIL NFR BLD: 1 % (ref 0.5–7.8)
ERYTHROCYTE [DISTWIDTH] IN BLOOD BY AUTOMATED COUNT: 16.2 % (ref 11.9–14.6)
FERRITIN SERPL-MCNC: 74 NG/ML (ref 8–388)
FOLATE SERPL-MCNC: 3.6 NG/ML (ref 3.1–17.5)
GLOBULIN SER CALC-MCNC: 4.6 G/DL (ref 2.8–4.5)
GLUCOSE SERPL-MCNC: 111 MG/DL (ref 65–100)
HCT VFR BLD AUTO: 29.6 % (ref 41.1–50.3)
HGB BLD-MCNC: 8.9 G/DL (ref 13.6–17.2)
HGB RETIC QN AUTO: 26 PG (ref 29–35)
IMM GRANULOCYTES # BLD AUTO: 0 K/UL (ref 0–0.5)
IMM GRANULOCYTES NFR BLD AUTO: 1 % (ref 0–5)
IMM RETICS NFR: 13.4 % (ref 2.3–13.4)
IRON SATN MFR SERPL: 13 %
IRON SERPL-MCNC: 32 UG/DL (ref 35–150)
LYMPHOCYTES # BLD: 1.3 K/UL (ref 0.5–4.6)
LYMPHOCYTES NFR BLD: 21 % (ref 13–44)
MCH RBC QN AUTO: 24.1 PG (ref 26.1–32.9)
MCHC RBC AUTO-ENTMCNC: 30.1 G/DL (ref 31.4–35)
MCV RBC AUTO: 80.2 FL (ref 82–102)
MONOCYTES # BLD: 0.5 K/UL (ref 0.1–1.3)
MONOCYTES NFR BLD: 7 % (ref 4–12)
NEUTS SEG # BLD: 4.5 K/UL (ref 1.7–8.2)
NEUTS SEG NFR BLD: 69 % (ref 43–78)
NRBC # BLD: 0 K/UL (ref 0–0.2)
PLATELET # BLD AUTO: 324 K/UL (ref 150–450)
PMV BLD AUTO: 9.2 FL (ref 9.4–12.3)
POTASSIUM SERPL-SCNC: 3 MMOL/L (ref 3.5–5.1)
PROT SERPL-MCNC: 7.8 G/DL (ref 6.3–8.2)
RBC # BLD AUTO: 3.69 M/UL (ref 4.23–5.6)
RETICS # AUTO: 0.08 M/UL (ref 0.03–0.1)
RETICS/RBC NFR AUTO: 2.2 % (ref 0.3–2)
SODIUM SERPL-SCNC: 139 MMOL/L (ref 136–146)
T4 FREE SERPL-MCNC: 1.2 NG/DL (ref 0.78–1.4)
TIBC SERPL-MCNC: 248 UG/DL (ref 250–450)
TSH, 3RD GENERATION: 1.1 UIU/ML (ref 0.36–3)
VIT B12 SERPL-MCNC: 162 PG/ML (ref 193–986)
WBC # BLD AUTO: 6.4 K/UL (ref 4.3–11.1)

## 2023-12-07 PROCEDURE — 84439 ASSAY OF FREE THYROXINE: CPT

## 2023-12-07 PROCEDURE — 83540 ASSAY OF IRON: CPT

## 2023-12-07 PROCEDURE — 3077F SYST BP >= 140 MM HG: CPT | Performed by: INTERNAL MEDICINE

## 2023-12-07 PROCEDURE — 84156 ASSAY OF PROTEIN URINE: CPT

## 2023-12-07 PROCEDURE — 99204 OFFICE O/P NEW MOD 45 MIN: CPT | Performed by: INTERNAL MEDICINE

## 2023-12-07 PROCEDURE — 82607 VITAMIN B-12: CPT

## 2023-12-07 PROCEDURE — 83521 IG LIGHT CHAINS FREE EACH: CPT

## 2023-12-07 PROCEDURE — 85046 RETICYTE/HGB CONCENTRATE: CPT

## 2023-12-07 PROCEDURE — 82784 ASSAY IGA/IGD/IGG/IGM EACH: CPT

## 2023-12-07 PROCEDURE — 86334 IMMUNOFIX E-PHORESIS SERUM: CPT

## 2023-12-07 PROCEDURE — 83550 IRON BINDING TEST: CPT

## 2023-12-07 PROCEDURE — 80053 COMPREHEN METABOLIC PANEL: CPT

## 2023-12-07 PROCEDURE — 84166 PROTEIN E-PHORESIS/URINE/CSF: CPT

## 2023-12-07 PROCEDURE — 86335 IMMUNFIX E-PHORSIS/URINE/CSF: CPT

## 2023-12-07 PROCEDURE — 85025 COMPLETE CBC W/AUTO DIFF WBC: CPT

## 2023-12-07 PROCEDURE — 82728 ASSAY OF FERRITIN: CPT

## 2023-12-07 PROCEDURE — 84165 PROTEIN E-PHORESIS SERUM: CPT

## 2023-12-07 PROCEDURE — 82746 ASSAY OF FOLIC ACID SERUM: CPT

## 2023-12-07 PROCEDURE — 3079F DIAST BP 80-89 MM HG: CPT | Performed by: INTERNAL MEDICINE

## 2023-12-07 PROCEDURE — 84443 ASSAY THYROID STIM HORMONE: CPT

## 2023-12-07 PROCEDURE — 36415 COLL VENOUS BLD VENIPUNCTURE: CPT

## 2023-12-07 RX ORDER — LISINOPRIL 10 MG/1
10 TABLET ORAL DAILY
COMMUNITY
End: 2023-12-07 | Stop reason: SDUPTHER

## 2023-12-07 RX ORDER — LISINOPRIL 10 MG/1
10 TABLET ORAL DAILY
Qty: 90 TABLET | Refills: 0 | Status: SHIPPED | OUTPATIENT
Start: 2023-12-07

## 2023-12-07 RX ORDER — FOLIC ACID 1 MG/1
1 TABLET ORAL DAILY
Qty: 90 TABLET | Refills: 1 | Status: SHIPPED | OUTPATIENT
Start: 2023-12-07

## 2023-12-07 NOTE — PROGRESS NOTES
NEW PATIENT INTAKE    Referral Diagnosis: Anemia, unspecified type    Referring Provider: Amparo Montana MD    Primary Care Provider: Michael Taylor MD    Family History of Cancer/ Hematology Disorders: None reported     Presenting Symptoms: Weakness and fatigue; chronic bloating with watery stools    Chronological History of Pertinent Events: Mr. Lyndon Mcallister is a 63-year-old male referred for anemia. 9/8/23 - 9/18/23: Admitted at [facility] with cavitary pneumonia after presenting to the ED with complaints of hemoptysis. Patient underwent aspiration of loculated pleural effusion on 9/13/23. PCR positive for TB and rifampin-resistance test was negative with plans to start 4-drug therapy. Blood cultures finalized negative, HIV testing negative, Cryptococcal antigen negative. Pt to be treated for TB through the health department. During admission pt was found to be iron deficient with iron 26, TIBC 228, and transferrin sat 11. HGB/HCT was 11.7/38.1 on admission and 9.7/31.1 on day of discharge. He was discharged on 325 mg of ferrous sulfate PO daily. 11/17/23: Pt returned to the [facility] ED on 11/17/23 for evaluation of a possible hemolytic anemia. Pt is currently receiving treatment with [facility] and has been compliant with RIPE therapy since 9/15/23. Per the reports he continues to have a few acid-fast bacilli on smears and is currently taking isoniazid, rifampin, and B6. He is having generalized fatigue and weakness as well as chronic bloating with watery stools. The CBCs they had obtained showed low HGB and elevated platelets (Epic/Media). The concern was for hemolytic anemia secondary to prolonged rifampin use or prolonged infection.  Pt is on a break from treatment starting 11/17 -11/27.  -Reviewing [facility]'s records hemoglobin on 11/15/23 was 9.7 with a MCV of 78.9 and platelet count of 360 (Epic/Media)  -Hemoccult in the ED was negative  -Labs in the ED were significant for RBC 3.54, HGB 8.6, HCT

## 2023-12-07 NOTE — PATIENT INSTRUCTIONS
Patient Instructions from Today's Visit    Reason for Visit:  New Patient - Anemia     Diagnosis Information:  [email or web address] net/about-us/asco-answers-patient-education-materials/jtzn-csvzkey-bdgg-sheets    Plan: We are seeing you today for anemia. Anemia can be due to nutritional deficiencies, hemolysis, chronic inflammatory state, a primary bone marrow disorder such as myelodysplasia etc.  We will be doing some additional lab work today to further investigate this. If there is anything concerning on the labs, we will call you and let you know. Otherwise, we will discuss the results in full during your next office visit. Depending on the results of your labs, we may need to discuss bone marrow biopsy in the future. If you are iron deficient, we will set you up for IV iron. This will either be two doses one week apart or three doses one week apart (depending on insurance authorization). It is recommended that you see gastroenterology for screening scopes (EGD and colonoscopy) to rule out any GI bleed as cause of anemia. Courtesy refill on Lisinopril sent to pharmacy - 90 day supply, 0 refills. You will need to contact primary care for any additional refills on this medication     Please call us if you have any questions or concerns before your next visit. Follow Up:  3 months with labs     Recent Lab Results:  No visits with results within 3 Day(s) from this visit.    Latest known visit with results is:   Admission on 11/17/2023, Discharged on 11/17/2023   Component Date Value Ref Range Status    Sodium 11/17/2023 136  133 - 143 mmol/L Final    Potassium 11/17/2023 3.9  3.5 - 5.1 mmol/L Final    Chloride 11/17/2023 109  101 - 110 mmol/L Final    CO2 11/17/2023 23  21 - 32 mmol/L Final    Anion Gap 11/17/2023 4  2 - 11 mmol/L Final    Glucose 11/17/2023 104 (H)  65 - 100 mg/dL Final    BUN 11/17/2023 15  8 - 23 MG/DL Final    Creatinine 11/17/2023 1.10  0.8 - 1.5 MG/DL Final    Est, Glom Filt

## 2023-12-08 LAB — PATH REV BLD -IMP: NORMAL

## 2023-12-11 LAB
ALBUMIN 24H MFR UR ELPH: 63.5 %
ALPHA1 GLOB 24H MFR UR ELPH: 7.4 %
ALPHA2 GLOB 24H MFR UR ELPH: 6.2 %
B-GLOBULIN MFR UR ELPH: 9.4 %
GAMMA GLOB 24H MFR UR ELPH: 13.6 %
INTERPRETATION UR IFE-IMP: NORMAL
KAPPA LC FREE SER-MCNC: 53.5 MG/L (ref 3.3–19.4)
KAPPA LC FREE/LAMBDA FREE SER: 1.78 (ref 0.26–1.65)
LAMBDA LC FREE SERPL-MCNC: 30.1 MG/L (ref 5.7–26.3)
Lab: NORMAL
M PROTEIN 24H MFR UR ELPH: NORMAL %
PATH REV BLD -IMP: NORMAL
PROT UR-MCNC: 69.6 MG/DL

## 2023-12-12 ENCOUNTER — HOSPITAL ENCOUNTER (OUTPATIENT)
Dept: INFUSION THERAPY | Age: 63
Setting detail: INFUSION SERIES
Discharge: HOME | End: 2023-12-12
Payer: COMMERCIAL

## 2023-12-12 VITALS
OXYGEN SATURATION: 97 % | DIASTOLIC BLOOD PRESSURE: 85 MMHG | HEART RATE: 101 BPM | RESPIRATION RATE: 16 BRPM | SYSTOLIC BLOOD PRESSURE: 172 MMHG | TEMPERATURE: 98.6 F

## 2023-12-12 DIAGNOSIS — E61.1 IRON DEFICIENCY: Primary | ICD-10-CM

## 2023-12-12 LAB
ALBUMIN SERPL ELPH-MCNC: 3.5 G/DL (ref 2.9–4.4)
ALBUMIN/GLOB SERPL: 1 (ref 0.7–1.7)
ALPHA1 GLOB SERPL ELPH-MCNC: 0.4 G/DL (ref 0–0.4)
ALPHA2 GLOB SERPL ELPH-MCNC: 0.9 G/DL (ref 0.4–1)
B-GLOBULIN SERPL ELPH-MCNC: 1 G/DL (ref 0.7–1.3)
GAMMA GLOB SERPL ELPH-MCNC: 1.5 G/DL (ref 0.4–1.8)
GLOBULIN SER-MCNC: 3.7 G/DL (ref 2.2–3.9)
IGA SERPL-MCNC: 314 MG/DL (ref 61–437)
IGG SERPL-MCNC: 1618 MG/DL (ref 603–1613)
IGM SERPL-MCNC: 9 MG/DL (ref 20–172)
INTERPRETATION SERPL IEP-IMP: ABNORMAL
M PROTEIN SERPL ELPH-MCNC: ABNORMAL G/DL
PROT SERPL-MCNC: 7.2 G/DL (ref 6–8.5)

## 2023-12-12 PROCEDURE — 6360000002 HC RX W HCPCS: Performed by: INTERNAL MEDICINE

## 2023-12-12 PROCEDURE — 96372 THER/PROPH/DIAG INJ SC/IM: CPT

## 2023-12-12 RX ORDER — ONDANSETRON 2 MG/ML
8 INJECTION INTRAMUSCULAR; INTRAVENOUS
Status: DISCONTINUED | OUTPATIENT
Start: 2023-12-12 | End: 2023-12-13 | Stop reason: HOSPADM

## 2023-12-12 RX ORDER — EPINEPHRINE 1 MG/ML
0.3 INJECTION, SOLUTION, CONCENTRATE INTRAVENOUS PRN
Status: DISCONTINUED | OUTPATIENT
Start: 2023-12-12 | End: 2023-12-13 | Stop reason: HOSPADM

## 2023-12-12 RX ORDER — CYANOCOBALAMIN 1000 UG/ML
1000 INJECTION, SOLUTION INTRAMUSCULAR; SUBCUTANEOUS ONCE
OUTPATIENT
Start: 2023-12-26

## 2023-12-12 RX ORDER — CYANOCOBALAMIN 1000 UG/ML
1000 INJECTION, SOLUTION INTRAMUSCULAR; SUBCUTANEOUS ONCE
Status: COMPLETED | OUTPATIENT
Start: 2023-12-12 | End: 2023-12-12

## 2023-12-12 RX ORDER — ONDANSETRON 2 MG/ML
8 INJECTION INTRAMUSCULAR; INTRAVENOUS
OUTPATIENT
Start: 2023-12-26

## 2023-12-12 RX ORDER — DIPHENHYDRAMINE HYDROCHLORIDE 50 MG/ML
50 INJECTION INTRAMUSCULAR; INTRAVENOUS
OUTPATIENT
Start: 2023-12-26

## 2023-12-12 RX ORDER — EPINEPHRINE 1 MG/ML
0.3 INJECTION, SOLUTION, CONCENTRATE INTRAVENOUS PRN
OUTPATIENT
Start: 2023-12-26

## 2023-12-12 RX ORDER — ACETAMINOPHEN 325 MG/1
650 TABLET ORAL
OUTPATIENT
Start: 2023-12-26

## 2023-12-12 RX ORDER — ALBUTEROL SULFATE 90 UG/1
4 AEROSOL, METERED RESPIRATORY (INHALATION) PRN
OUTPATIENT
Start: 2023-12-26

## 2023-12-12 RX ORDER — SODIUM CHLORIDE 9 MG/ML
INJECTION, SOLUTION INTRAVENOUS CONTINUOUS
OUTPATIENT
Start: 2023-12-26

## 2023-12-12 RX ORDER — ACETAMINOPHEN 325 MG/1
650 TABLET ORAL
Status: DISCONTINUED | OUTPATIENT
Start: 2023-12-12 | End: 2023-12-13 | Stop reason: HOSPADM

## 2023-12-12 RX ORDER — ALBUTEROL SULFATE 90 UG/1
4 AEROSOL, METERED RESPIRATORY (INHALATION) PRN
Status: DISCONTINUED | OUTPATIENT
Start: 2023-12-12 | End: 2023-12-13 | Stop reason: HOSPADM

## 2023-12-12 RX ORDER — CYANOCOBALAMIN 1000 UG/ML
1000 INJECTION, SOLUTION INTRAMUSCULAR; SUBCUTANEOUS ONCE
OUTPATIENT
Start: 2023-12-26 | End: 2023-12-26

## 2023-12-12 RX ORDER — DIPHENHYDRAMINE HYDROCHLORIDE 50 MG/ML
50 INJECTION INTRAMUSCULAR; INTRAVENOUS
Status: DISCONTINUED | OUTPATIENT
Start: 2023-12-12 | End: 2023-12-13 | Stop reason: HOSPADM

## 2023-12-12 RX ADMIN — CYANOCOBALAMIN 1000 MCG: 1000 INJECTION, SOLUTION INTRAMUSCULAR; SUBCUTANEOUS at 14:27

## 2023-12-12 NOTE — PROGRESS NOTES
Arrived to the [address] [facility] ambulatory. B12 injection and education on this injection completed. Provided education on how to draw up medication using a syringe, how to inject medication into muscle(pt injected into his L deltoid), and educated on how to properly engage the safety over the needle and how to properly discard the needles. Handouts given to patient. Also referenced [employer] videos and provided triage number if he has any questions. Patient instructed to report any side affects to ordering provider. Patient tolerated well. Any issues or concerns during appointment: none. Patient aware of next office appointment on 3/12/24 (date) at 0 (time). Discharged home ambulatory with his B12 vials, syringes, alcohol pads and bandaids. No further questions at this time.

## 2023-12-13 ENCOUNTER — TELEPHONE (OUTPATIENT)
Dept: ONCOLOGY | Age: 63
End: 2023-12-13

## 2023-12-13 NOTE — TELEPHONE ENCOUNTER
Call to patient to request permission to send his information to the [address] [place] [address].  He gave consent , and information faxed to number supplied

## 2023-12-13 NOTE — TELEPHONE ENCOUNTER
Received call from [facility] with [facility]- [employer] Department requesting Pt Labs from 12/07/23 faxed to them @f- [phone] Att: Kelly Castro

## 2024-01-09 ENCOUNTER — OFFICE VISIT (OUTPATIENT)
Age: 64
End: 2024-01-09

## 2024-01-09 DIAGNOSIS — E61.1 IRON DEFICIENCY: Primary | Chronic | ICD-10-CM

## 2024-01-09 PROCEDURE — NBSRV NON-BILLABLE SERVICE: Performed by: PHYSICIAN ASSISTANT
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
## 2024-01-16 ENCOUNTER — OFFICE VISIT (OUTPATIENT)
Age: 64
End: 2024-01-16
Payer: COMMERCIAL

## 2024-01-16 VITALS — WEIGHT: 153 LBS | BODY MASS INDEX: 21.95 KG/M2 | SYSTOLIC BLOOD PRESSURE: 130 MMHG | DIASTOLIC BLOOD PRESSURE: 90 MMHG

## 2024-01-16 DIAGNOSIS — E61.1 IRON DEFICIENCY: Primary | Chronic | ICD-10-CM

## 2024-01-16 DIAGNOSIS — Z65.9 CONCERNED ABOUT HAVING SOCIAL PROBLEM: ICD-10-CM

## 2024-01-16 PROCEDURE — 3080F DIAST BP >= 90 MM HG: CPT | Performed by: PHYSICIAN ASSISTANT

## 2024-01-16 PROCEDURE — 3075F SYST BP GE 130 - 139MM HG: CPT | Performed by: PHYSICIAN ASSISTANT

## 2024-01-16 PROCEDURE — 99203 OFFICE O/P NEW LOW 30 MIN: CPT | Performed by: PHYSICIAN ASSISTANT

## 2024-01-16 RX ORDER — SODIUM CHLORIDE 9 MG/ML
25 INJECTION, SOLUTION INTRAVENOUS PRN
Status: CANCELLED | OUTPATIENT
Start: 2024-01-16

## 2024-01-16 RX ORDER — SODIUM CHLORIDE 0.9 % (FLUSH) 0.9 %
5-40 SYRINGE (ML) INJECTION EVERY 12 HOURS SCHEDULED
Status: CANCELLED | OUTPATIENT
Start: 2024-01-16

## 2024-01-16 RX ORDER — RIFAMPIN 300 MG/1
600 CAPSULE ORAL DAILY
COMMUNITY

## 2024-01-16 RX ORDER — LANOLIN ALCOHOL/MO/W.PET/CERES
50 CREAM (GRAM) TOPICAL DAILY
COMMUNITY

## 2024-01-16 RX ORDER — PYRAZINAMIDE TABLET 500 MG/1
1500 TABLET ORAL DAILY
COMMUNITY

## 2024-01-16 RX ORDER — ETHAMBUTOL HYDROCHLORIDE 400 MG/1
1200 TABLET, FILM COATED ORAL DAILY
COMMUNITY

## 2024-01-16 RX ORDER — SODIUM CHLORIDE 0.9 % (FLUSH) 0.9 %
5-40 SYRINGE (ML) INJECTION PRN
Status: CANCELLED | OUTPATIENT
Start: 2024-01-16

## 2024-01-16 RX ORDER — ISONIAZID 300 MG/1
300 TABLET ORAL DAILY
COMMUNITY

## 2024-01-16 NOTE — PROGRESS NOTES
Marshall Scales (:  1960) is a 63 y.o. male new patient referred to our office for evaluation of the following chief complaint(s):  New Patient (anemia)           ASSESSMENT/PLAN:  1. Iron deficiency  2. Concerned about having social problem  -     BS - Care Coordination/Social Work [facility]    No prior endoscopy. Most recent Hgb 8.9 and labs consistent with RANDI. Followed by hematology, receiving B12 injection and PO iron supplementation. Schedule EGD and colonoscopy evaluation. His wife doesn't drive and he says he will not have someone to drive him to the procedure. His older sister lives in [place] and he says she's not able to come either. He requests social work referral.     Subjective   SUBJECTIVE/OBJECTIVE  Marshall Scales is a 63 y.o. 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 He denies abdominal surgical history.    Patient was referred to our office for evaluation of iron deficiency anemia. Referral notes reviewed from hematology OV 23. No prior GI or endoscopic evaluation discovered on chart review. Work-up to-date notable for:     -Labs 23: iron 32, TIBC 248, transferrin saturation 13%, vitamin B12 162, folate WNL 3.6, ferritin WNL 74, Hgb 8.9 (10.9 in September)  -Peripheral smear 23: hypochromic microcytic anemia consistent with RANDI, Rouleaux  -FOBT negative x 3 on -23    Today patient reports no prior endoscopic evaluation. Patient denies family hx of GI malignancy, IBD, or celiac disease. Denies hx tobacco. He is not drinking alcohol. Previously drank socially 2-3 x a week. Denies hx OAC or NSAID use. He previously was taking some occasional Naproxen and Advil for shoulder pain related to TB treatments. He continues to receive TB treatment though the health department with plans to treat until March.     Today patient denies abdominal pain, nausea,

## 2024-01-17 ENCOUNTER — TELEPHONE (OUTPATIENT)
Dept: GASTROENTEROLOGY | Age: 64
End: 2024-01-17

## 2024-01-18 ENCOUNTER — CARE COORDINATION (OUTPATIENT)
Dept: CARE COORDINATION | Facility: CLINIC | Age: 64
End: 2024-01-18

## 2024-01-18 NOTE — CARE COORDINATION
PREETI RENNER received call from pt this day.  Pt reports that he no longer needs assistance from PREETI RENNER.  He will be using an Uber and a friend to get to and from the hospital the day of the surgery.  Pt voiced no additional needs at this time. Program to close at this time.

## 2024-01-18 NOTE — CARE COORDINATION
PREETI RENNER received referral from provider regarding assistance with pt's upcoming scheduled surgery.  PREETI RENNER reached out to pt this day to follow up and introduce self.  Unable to reach pt at this time - left VM with return call details.  Will await return call and/or follow up at a later time.

## 2024-01-19 ENCOUNTER — TELEPHONE (OUTPATIENT)
Dept: GASTROENTEROLOGY | Age: 64
End: 2024-01-19

## 2024-01-19 NOTE — TELEPHONE ENCOUNTER
Patient called relaying concerns about his diabetes and becoming hypoglycemic when he does the colonoscopy prep and asked for advice.    I relayed to him that I would have one of our medical team call him to answer his questions.

## 2024-01-23 ENCOUNTER — TELEPHONE (OUTPATIENT)
Dept: GASTROENTEROLOGY | Age: 64
End: 2024-01-23

## 2024-01-23 NOTE — TELEPHONE ENCOUNTER
Received a message to call patient. Patient voiced concerns for prepping for colonoscopy. Advised that he may have clear liquid diet the day of prep. Recommended patient to obtain clear liquids with real sugar and without/ holding diabetic medication during prep. Patient asking to be admitted in hospital the day prior to colonoscopy for monitoring. Sent message to surgery scheduler to find out if patient's insurance will allow. Patient verbalized understanding.

## 2024-01-24 ENCOUNTER — TELEPHONE (OUTPATIENT)
Dept: GASTROENTEROLOGY | Age: 64
End: 2024-01-24

## 2024-01-24 NOTE — TELEPHONE ENCOUNTER
Relayed to patient that he could not be admitted to hospital prior to GI procedure.     Insurance would not cover.

## 2024-02-01 NOTE — PROGRESS NOTES
Patient verified name, , and procedure.    Type: 1a; abbreviated assessment per anesthesia guidelines    Labs per anesthesia: none    Instructed pt that they will be notified the day before their procedure by the GI Lab for time of arrival if their procedure is [place] and Pre-op for [place] cases. Arrival times should be called by 5 pm. If no phone is received the patient should contact their respective hospital. The GI lab telephone number is [phone] and ES Pre-op is [phone].     Follow diet and prep instructions per office including NPO status.  If patient has NOT received instructions from office patient is advised to call surgeon office, verbalizes understanding.    Bath or shower the night before and the am of surgery with non-moisturizing soap. No lotions, oils, powders, cologne on skin. No make up, eye make up or jewelry. Wear loose fitting comfortable, clean clothing.     Must have adult present in building the entire time .     Medications for the day of procedure Ethambutol, Isoniazid, Pyrazinamide, Rifampin, patient to hold vitamins per anesthesia guidelines.

## 2024-02-02 ENCOUNTER — TELEPHONE (OUTPATIENT)
Dept: GASTROENTEROLOGY | Age: 64
End: 2024-02-02

## 2024-02-02 NOTE — TELEPHONE ENCOUNTER
Spoke with patient  and confirmed  procedure on 2/6 with Dr. Izquierdo . Let the know arrival will be at 7:15am . Patient mentioned he would be taking a cab, let him know we would need someone with him  the whole time, patient stated his wife would be with him.

## 2024-02-05 ENCOUNTER — ANESTHESIA EVENT (OUTPATIENT)
Dept: ENDOSCOPY | Age: 64
End: 2024-02-05
Payer: COMMERCIAL

## 2024-02-06 ENCOUNTER — PREP FOR PROCEDURE (OUTPATIENT)
Dept: GASTROENTEROLOGY | Age: 64
End: 2024-02-06

## 2024-02-06 ENCOUNTER — ANESTHESIA (OUTPATIENT)
Dept: ENDOSCOPY | Age: 64
End: 2024-02-06
Payer: COMMERCIAL

## 2024-02-06 ENCOUNTER — HOSPITAL ENCOUNTER (OUTPATIENT)
Age: 64
Setting detail: OUTPATIENT SURGERY
Discharge: HOME | End: 2024-02-06
Attending: STUDENT IN AN ORGANIZED HEALTH CARE EDUCATION/TRAINING PROGRAM | Admitting: STUDENT IN AN ORGANIZED HEALTH CARE EDUCATION/TRAINING PROGRAM
Payer: COMMERCIAL

## 2024-02-06 VITALS
BODY MASS INDEX: 22.05 KG/M2 | HEART RATE: 89 BPM | SYSTOLIC BLOOD PRESSURE: 113 MMHG | RESPIRATION RATE: 16 BRPM | TEMPERATURE: 97.3 F | HEIGHT: 70 IN | WEIGHT: 154 LBS | DIASTOLIC BLOOD PRESSURE: 72 MMHG | OXYGEN SATURATION: 100 %

## 2024-02-06 DIAGNOSIS — Z65.9 CONCERNED ABOUT HAVING SOCIAL PROBLEM: ICD-10-CM

## 2024-02-06 PROBLEM — Z12.11 ENCOUNTER FOR SCREENING FOR MALIGNANT NEOPLASM OF COLON: Status: ACTIVE | Noted: 2024-02-06

## 2024-02-06 LAB
GLUCOSE BLD STRIP.AUTO-MCNC: 150 MG/DL (ref 65–100)
SERVICE CMNT-IMP: ABNORMAL

## 2024-02-06 PROCEDURE — 2500000003 HC RX 250 WO HCPCS: Performed by: NURSE ANESTHETIST, CERTIFIED REGISTERED

## 2024-02-06 PROCEDURE — 3700000000 HC ANESTHESIA ATTENDED CARE: Performed by: STUDENT IN AN ORGANIZED HEALTH CARE EDUCATION/TRAINING PROGRAM

## 2024-02-06 PROCEDURE — 6370000000 HC RX 637 (ALT 250 FOR IP): Performed by: NURSE ANESTHETIST, CERTIFIED REGISTERED

## 2024-02-06 PROCEDURE — 2580000003 HC RX 258: Performed by: ANESTHESIOLOGY

## 2024-02-06 PROCEDURE — 7100000011 HC PHASE II RECOVERY - ADDTL 15 MIN: Performed by: STUDENT IN AN ORGANIZED HEALTH CARE EDUCATION/TRAINING PROGRAM

## 2024-02-06 PROCEDURE — 3609012400 HC EGD TRANSORAL BIOPSY SINGLE/MULTIPLE: Performed by: STUDENT IN AN ORGANIZED HEALTH CARE EDUCATION/TRAINING PROGRAM

## 2024-02-06 PROCEDURE — 82962 GLUCOSE BLOOD TEST: CPT

## 2024-02-06 PROCEDURE — 2500000003 HC RX 250 WO HCPCS: Performed by: ANESTHESIOLOGY

## 2024-02-06 PROCEDURE — 3700000001 HC ADD 15 MINUTES (ANESTHESIA): Performed by: STUDENT IN AN ORGANIZED HEALTH CARE EDUCATION/TRAINING PROGRAM

## 2024-02-06 PROCEDURE — 3609010600 HC COLONOSCOPY POLYPECTOMY SNARE/COLD BIOPSY: Performed by: STUDENT IN AN ORGANIZED HEALTH CARE EDUCATION/TRAINING PROGRAM

## 2024-02-06 PROCEDURE — 88312 SPECIAL STAINS GROUP 1: CPT

## 2024-02-06 PROCEDURE — 6360000002 HC RX W HCPCS: Performed by: NURSE ANESTHETIST, CERTIFIED REGISTERED

## 2024-02-06 PROCEDURE — 2709999900 HC NON-CHARGEABLE SUPPLY: Performed by: STUDENT IN AN ORGANIZED HEALTH CARE EDUCATION/TRAINING PROGRAM

## 2024-02-06 PROCEDURE — 88305 TISSUE EXAM BY PATHOLOGIST: CPT

## 2024-02-06 PROCEDURE — 7100000010 HC PHASE II RECOVERY - FIRST 15 MIN: Performed by: STUDENT IN AN ORGANIZED HEALTH CARE EDUCATION/TRAINING PROGRAM

## 2024-02-06 RX ORDER — SODIUM CHLORIDE 9 MG/ML
INJECTION, SOLUTION INTRAVENOUS PRN
Status: DISCONTINUED | OUTPATIENT
Start: 2024-02-06 | End: 2024-02-06 | Stop reason: HOSPADM

## 2024-02-06 RX ORDER — GLYCOPYRROLATE 0.2 MG/ML
INJECTION INTRAMUSCULAR; INTRAVENOUS PRN
Status: DISCONTINUED | OUTPATIENT
Start: 2024-02-06 | End: 2024-02-06 | Stop reason: SDUPTHER

## 2024-02-06 RX ORDER — SODIUM CHLORIDE 0.9 % (FLUSH) 0.9 %
5-40 SYRINGE (ML) INJECTION PRN
Status: DISCONTINUED | OUTPATIENT
Start: 2024-02-06 | End: 2024-02-06 | Stop reason: HOSPADM

## 2024-02-06 RX ORDER — DEXTROSE MONOHYDRATE 100 MG/ML
INJECTION, SOLUTION INTRAVENOUS CONTINUOUS PRN
Status: DISCONTINUED | OUTPATIENT
Start: 2024-02-06 | End: 2024-02-06 | Stop reason: HOSPADM

## 2024-02-06 RX ORDER — SODIUM CHLORIDE 0.9 % (FLUSH) 0.9 %
5-40 SYRINGE (ML) INJECTION EVERY 12 HOURS SCHEDULED
Status: DISCONTINUED | OUTPATIENT
Start: 2024-02-06 | End: 2024-02-06 | Stop reason: HOSPADM

## 2024-02-06 RX ORDER — SODIUM CHLORIDE 9 MG/ML
25 INJECTION, SOLUTION INTRAVENOUS PRN
Status: DISCONTINUED | OUTPATIENT
Start: 2024-02-06 | End: 2024-02-06 | Stop reason: HOSPADM

## 2024-02-06 RX ORDER — PROPOFOL 10 MG/ML
INJECTION, EMULSION INTRAVENOUS PRN
Status: DISCONTINUED | OUTPATIENT
Start: 2024-02-06 | End: 2024-02-06 | Stop reason: SDUPTHER

## 2024-02-06 RX ORDER — LIDOCAINE HYDROCHLORIDE 10 MG/ML
1 INJECTION, SOLUTION INFILTRATION; PERINEURAL
Status: COMPLETED | OUTPATIENT
Start: 2024-02-06 | End: 2024-02-06

## 2024-02-06 RX ORDER — SODIUM CHLORIDE, SODIUM LACTATE, POTASSIUM CHLORIDE, CALCIUM CHLORIDE 600; 310; 30; 20 MG/100ML; MG/100ML; MG/100ML; MG/100ML
INJECTION, SOLUTION INTRAVENOUS CONTINUOUS
Status: DISCONTINUED | OUTPATIENT
Start: 2024-02-06 | End: 2024-02-06 | Stop reason: HOSPADM

## 2024-02-06 RX ORDER — LIDOCAINE HYDROCHLORIDE 20 MG/ML
INJECTION, SOLUTION EPIDURAL; INFILTRATION; INTRACAUDAL; PERINEURAL PRN
Status: DISCONTINUED | OUTPATIENT
Start: 2024-02-06 | End: 2024-02-06 | Stop reason: SDUPTHER

## 2024-02-06 RX ORDER — EPHEDRINE SULFATE/0.9% NACL/PF 50 MG/5 ML
SYRINGE (ML) INTRAVENOUS PRN
Status: DISCONTINUED | OUTPATIENT
Start: 2024-02-06 | End: 2024-02-06 | Stop reason: SDUPTHER

## 2024-02-06 RX ADMIN — SODIUM CHLORIDE, POTASSIUM CHLORIDE, SODIUM LACTATE AND CALCIUM CHLORIDE: 600; 310; 30; 20 INJECTION, SOLUTION INTRAVENOUS at 08:15

## 2024-02-06 RX ADMIN — Medication 150 MCG: at 08:44

## 2024-02-06 RX ADMIN — BENZOCAINE 1 EACH: 200 SPRAY DENTAL; ORAL; PERIODONTAL at 08:29

## 2024-02-06 RX ADMIN — Medication 50 MCG: at 09:04

## 2024-02-06 RX ADMIN — GLYCOPYRROLATE 0.1 MG: 0.2 INJECTION INTRAMUSCULAR; INTRAVENOUS at 08:32

## 2024-02-06 RX ADMIN — Medication 100 MCG: at 08:37

## 2024-02-06 RX ADMIN — LIDOCAINE HYDROCHLORIDE 30 MG: 20 INJECTION, SOLUTION EPIDURAL; INFILTRATION; INTRACAUDAL; PERINEURAL at 08:31

## 2024-02-06 RX ADMIN — LIDOCAINE HYDROCHLORIDE 1 ML: 10 INJECTION, SOLUTION INFILTRATION; PERINEURAL at 08:14

## 2024-02-06 RX ADMIN — Medication 100 MCG: at 08:50

## 2024-02-06 RX ADMIN — PROPOFOL 180 MCG/KG/MIN: 10 INJECTION, EMULSION INTRAVENOUS at 08:33

## 2024-02-06 RX ADMIN — Medication 5 MG: at 09:04

## 2024-02-06 RX ADMIN — PROPOFOL 50 MG: 10 INJECTION, EMULSION INTRAVENOUS at 08:32

## 2024-02-06 RX ADMIN — Medication 10 MG: at 08:44

## 2024-02-06 RX ADMIN — Medication 5 MG: at 08:56

## 2024-02-06 RX ADMIN — Medication 100 MCG: at 08:56

## 2024-02-06 ASSESSMENT — PAIN SCALES - GENERAL
PAINLEVEL_OUTOF10: 0

## 2024-02-06 ASSESSMENT — PAIN - FUNCTIONAL ASSESSMENT: PAIN_FUNCTIONAL_ASSESSMENT: 0-10

## 2024-02-06 NOTE — ANESTHESIA POSTPROCEDURE EVALUATION
Department of Anesthesiology  Postprocedure Note    Patient: Marshall Scales  MRN: [id number]  YOB: 1960  Date of evaluation: 2/6/2024    Procedure Summary       Date: 02/06/24 Room / Location: [facility] / [facility]    Anesthesia Start: 0826 Anesthesia Stop: 0906    Procedures:       COLONOSCOPY POLYPECTOMY SNARE/COLD BIOPSY (Lower GI Region)      EGD BIOPSY (Upper GI Region) Diagnosis:       Iron deficiency      Concerned about having social problem      (Iron deficiency [E61.1])      (Concerned about having social problem [Z65.9])    Surgeons: Radha Izquierdo MD Responsible Provider: Wilmer Rodney MD    Anesthesia Type: TIVA ASA Status: 3            Anesthesia Type: TIVA    Ailyn Phase I:      Ailyn Phase II: Ailyn Score: 10    Anesthesia Post Evaluation    Patient location during evaluation: PACU  Patient participation: complete - patient participated  Level of consciousness: awake and alert  Airway patency: patent  Nausea: well controlled.  Cardiovascular status: acceptable.  Respiratory status: acceptable  Hydration status: stable  Pain management: adequate    No notable events documented.

## 2024-02-06 NOTE — ANESTHESIA PRE PROCEDURE
Department of Anesthesiology  Preprocedure Note       Name:  Marshall Scales   Age:  63 y.o.  :  1960                                          MRN:  [id number]         Date:  2024      Surgeon: Surgeon(s):  Radha Izquierdo MD    Procedure: Procedure(s):  COLORECTAL CANCER SCREENING, NOT HIGH RISK  EGD ESOPHAGOGASTRODUODENOSCOPY    Medications prior to admission:   Prior to Admission medications    Medication Sig Start Date End Date Taking? Authorizing Provider   isoniazid (NYDRAZID) 300 MG tablet Take 1 tablet by mouth daily    Reina Moraes MD   rifAMPin (RIFADIN) 300 MG capsule Take 2 capsules by mouth daily    Reina Moraes MD   pyrazinamide 500 MG tablet Take 3 tablets by mouth daily    Reina Moraes MD   ethambutol (MYAMBUTOL) 400 MG tablet Take 3 tablets by mouth daily    Reina Moraes MD   vitamin B-6 (PYRIDOXINE) 50 MG tablet Take 1 tablet by mouth daily    Reina Moraes MD   cyanocobalamin 1000 MCG/ML injection Inject 1mL weekly x8 weeks and then once every 4 weeks thereafter 23   Sagar Archer MD   lisinopril (PRINIVIL;ZESTRIL) 10 MG tablet Take 1 tablet by mouth daily  Patient taking differently: Take 2 tablets by mouth daily 23   Sagar Archer MD   folic acid (FOLVITE) 1 MG tablet Take 1 tablet by mouth daily  Patient not taking: Reported on 2024   Sagar Archer MD   coenzyme Q10 100 MG CAPS capsule Take 1 capsule by mouth daily  Patient not taking: Reported on 2023    Reina Moraes MD   atorvastatin (LIPITOR) 20 MG tablet Take 1 tablet by mouth every evening 23   Reina Moraes MD   metFORMIN (GLUCOPHAGE) 1000 MG tablet Take 1 tablet by mouth 2 times daily (with meals) 21   Automatic Reconciliation, Ar       Current medications:    Current Facility-Administered Medications   Medication Dose Route Frequency Provider Last Rate Last Admin   • lactated ringers IV soln infusion   IntraVENous Continuous

## 2024-02-06 NOTE — OP NOTE
Operative Report    Patient: Marshall Scales MRN: [id number]      YOB: 1960  Age: 63 y.o.  Sex: male            Indications:  Screening    Preoperative Evaluation: The patient was evaluated prior to the procedure in the GI lab admission area, the patient ASA was recorded .  Consent was obtained from the patient with the risk of perforation bleeding and aspiration.    Anesthesia: CECE-per anesthesia    Complications: None; patient tolerated the procedure well.    EBL -insignificant      Procedure: The patient was sedated in the left lateral decubitus position.  Scope was advanced from the rectum to the cecum.  Per gastroenterology society guideline recommendations, right side of the colon was examined twice. Evaluation was performed to the cecum twice.  The scope was withdrawn to the rectum, retroflexed view was performed.  The rectal exam was normal.  Preparation was adequate. [place] score of 9 .    Findings:    11 polyps throughout the colon, size ranging between 3-10 mm, removed via cold snare polypectomy.   Otherwise normal appearing colon.  Small internal hemorrhoids.     Postoperative Diagnosis:   11 polyps.     Recommendations:   Interval of the next colonoscopy will be determined by pending pathology, likely 1 year)    Signed By:  Radha Izquierdo MD     February 6, 2024

## 2024-02-06 NOTE — OP NOTE
Endoscopy Note          Operative Report    Patient: Marshall Scales MRN: [id number]      YOB: 1960  Age: 63 y.o.  Sex: male            Indications:  Iron def anemia    Preoperative Evaluation: The patient was evaluated prior to the procedure in the GI lab admission area, the patient ASA was recorded .  Consent was obtained from the patient with the risk of perforation bleeding and aspiration.    Anesthesia: CECE-per anesthesia  Complications: None  EBL: Unremarkable  Procedure: The patient was sedated in the left lateral decubitus position. Scope was advance from the mouth to the third portion of the duodenum. The scope was withdrawn to the stomach and a refroflexed view was performed.     Findings:  Normal esophagus, stomach and duodenum.  Biopsies obtained for h.pylori.   Biopsies obtained for celiac.     Postoperative Diagnosis:  Normal EGD.     Recommendations:   Neg FOBT, would not do further GI work up for RANDI.    Signed By:  Radha Izquierdo MD     February 6, 2024
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
## 2024-02-06 NOTE — DISCHARGE INSTRUCTIONS
colon.  Small internal hemorrhoids.    *Next colonoscopy most likely 1 year, depending on pathology results*    Otherwise, continue home medications as previously prescribed.     MEDICATION INTERACTION:    During your procedure you potentially received a medication or medications which may reduce the effectiveness of oral contraceptives. Please consider other forms of contraception for 1 month following your procedure if you are currently using oral contraceptives as your primary form of birth control. In addition to this, we recommend continuing your oral contraceptive as prescribed, unless otherwise instructed by your physician, during this time.    After general anesthesia or intravenous sedation, for 24 hours or while taking prescription Narcotics:  Limit your activities  A responsible adult needs to be with you for the next 24 hours  Do not drive and operate hazardous machinery  Do not make important personal or business decisions  Do not drink alcoholic beverages  If you have not urinated within 8 hours after discharge, and you are experiencing discomfort from urinary retention, please go to the nearest ED.  If you have sleep apnea and have a CPAP machine, please use it for all naps and sleeping.  Please use caution when taking narcotics and any of your home medications that may cause drowsiness.  *  Please give a list of your current medications to your Primary Care Provider.  *  Please update this list whenever your medications are discontinued, doses are      changed, or new medications (including over-the-counter products) are added.  *  Please carry medication information at all times in case of emergency situations.    These are general instructions for a healthy lifestyle:  No smoking/ No tobacco products/ Avoid exposure to second hand smoke  Surgeon General's Warning:  Quitting smoking now greatly reduces serious risk to your health.  Obesity, smoking, and sedentary lifestyle greatly increases your

## 2024-02-29 ENCOUNTER — TELEPHONE (OUTPATIENT)
Dept: GASTROENTEROLOGY | Age: 64
End: 2024-02-29

## 2024-03-07 DIAGNOSIS — D64.9 ANEMIA, UNSPECIFIED TYPE: Primary | ICD-10-CM

## 2024-03-07 PROBLEM — Z12.11 ENCOUNTER FOR SCREENING FOR MALIGNANT NEOPLASM OF COLON: Status: RESOLVED | Noted: 2024-02-06 | Resolved: 2024-03-07

## 2024-03-11 NOTE — PROGRESS NOTES
auscultation, no rales or wheezing, no accessory muscle use  Abdomen: Soft, and non-tender on palpation, no organomegaly, bowel sounds audible  Extremities: No peripheral edema, no joint swelling  Neurological: patient can follow commands and move all extremities    Labs:  Lab Results   Component Value Date    WBC 6.9 03/12/2024    HGB 11.2 (L) 03/12/2024    HCT 35.5 (L) 03/12/2024    MCV 78.5 (L) 03/12/2024     03/12/2024     Lab Results   Component Value Date    NEUTROABS 4.6 03/12/2024    LYMPHOPCT 23 03/12/2024    LYMPHSABS 1.6 03/12/2024    MONOPCT 9 03/12/2024    MONOSABS 0.6 03/12/2024    EOSABS 0.1 03/12/2024    BASOPCT 1 03/12/2024     Lab Results   Component Value Date     03/12/2024    K 5.2 (H) 03/12/2024     03/12/2024    CO2 29 03/12/2024    BUN 20 03/12/2024    CREATININE 1.40 03/12/2024    GLUCOSE 162 (H) 03/12/2024    CALCIUM 9.8 03/12/2024    PROT 8.0 03/12/2024    LABALBU 3.7 03/12/2024    BILITOT 0.6 03/12/2024    ALKPHOS 103 03/12/2024    AST 23 03/12/2024    ALT 16 03/12/2024    LABGLOM 57 (L) 03/12/2024    GFRAA >60 02/10/2021    AGRATIO 0.9 03/12/2024    GLOB 4.3 03/12/2024     Transient saturation 13%, ferritin 74, folate 3.6, TSH/T4 within normal notes  WBC'S: HYPOCHROMIC MICROCYTIC ANEMIA CONSISTENT WITH IRON DEFICIENCY,   ROULEAUX   WBC'S: MORPHOLOGICALLY UNREMARKABLE   PLT'S: MORPHOLOGICALLY UNREMARKABLE   FOBT negative  KLR 1.78, UPEP unremarkable.Polyclonal increase detected in one or more immunoglobulins.    B12 low -162  Folate 3.6- low normal      Imaging:  No results found.      Problems:  63 years old male patient with history of type 2 diabetes mellitus and recent diagnosis of pulmonary tuberculosis (currently on RIPE) who has been referred for evaluation and management of microcytic anemia.     PLAN:  3/12/2024: He underwent EGD and colonoscopy, findings noted above.  Based on low TSAT, somewhat inappropriately low level of ferritin in the setting of

## 2024-03-12 ENCOUNTER — OFFICE VISIT (OUTPATIENT)
Dept: ONCOLOGY | Age: 64
End: 2024-03-12
Payer: COMMERCIAL

## 2024-03-12 ENCOUNTER — HOSPITAL ENCOUNTER (OUTPATIENT)
Dept: LAB | Age: 64
Discharge: HOME | End: 2024-03-15
Payer: COMMERCIAL

## 2024-03-12 VITALS
BODY MASS INDEX: 21.97 KG/M2 | DIASTOLIC BLOOD PRESSURE: 83 MMHG | RESPIRATION RATE: 18 BRPM | WEIGHT: 153.1 LBS | TEMPERATURE: 98 F | HEART RATE: 94 BPM | SYSTOLIC BLOOD PRESSURE: 148 MMHG

## 2024-03-12 DIAGNOSIS — D64.9 ANEMIA, UNSPECIFIED TYPE: ICD-10-CM

## 2024-03-12 DIAGNOSIS — J98.4 CAVITARY LUNG DISEASE: ICD-10-CM

## 2024-03-12 DIAGNOSIS — D64.9 ANEMIA, UNSPECIFIED TYPE: Primary | ICD-10-CM

## 2024-03-12 DIAGNOSIS — J98.4 CAVITARY PNEUMONIA: ICD-10-CM

## 2024-03-12 DIAGNOSIS — J18.9 CAVITARY PNEUMONIA: ICD-10-CM

## 2024-03-12 LAB
ALBUMIN SERPL-MCNC: 3.7 G/DL (ref 3.2–4.6)
ALBUMIN/GLOB SERPL: 0.9 (ref 0.4–1.6)
ALP SERPL-CCNC: 103 U/L (ref 50–136)
ALT SERPL-CCNC: 16 U/L (ref 12–65)
ANION GAP SERPL CALC-SCNC: 4 MMOL/L (ref 2–11)
AST SERPL-CCNC: 23 U/L (ref 15–37)
BASOPHILS # BLD: 0.1 K/UL (ref 0–0.2)
BASOPHILS NFR BLD: 1 % (ref 0–2)
BILIRUB SERPL-MCNC: 0.6 MG/DL (ref 0.2–1.1)
BUN SERPL-MCNC: 20 MG/DL (ref 8–23)
CALCIUM SERPL-MCNC: 9.8 MG/DL (ref 8.3–10.4)
CHLORIDE SERPL-SCNC: 105 MMOL/L (ref 103–113)
CO2 SERPL-SCNC: 29 MMOL/L (ref 21–32)
CREAT SERPL-MCNC: 1.4 MG/DL (ref 0.8–1.5)
DIFFERENTIAL METHOD BLD: ABNORMAL
EOSINOPHIL # BLD: 0.1 K/UL (ref 0–0.8)
EOSINOPHIL NFR BLD: 1 % (ref 0.5–7.8)
ERYTHROCYTE [DISTWIDTH] IN BLOOD BY AUTOMATED COUNT: 14.9 % (ref 11.9–14.6)
FERRITIN SERPL-MCNC: 38 NG/ML (ref 8–388)
GLOBULIN SER CALC-MCNC: 4.3 G/DL (ref 2.8–4.5)
GLUCOSE SERPL-MCNC: 162 MG/DL (ref 65–100)
HCT VFR BLD AUTO: 35.5 % (ref 41.1–50.3)
HGB BLD-MCNC: 11.2 G/DL (ref 13.6–17.2)
IMM GRANULOCYTES # BLD AUTO: 0 K/UL (ref 0–0.5)
IMM GRANULOCYTES NFR BLD AUTO: 1 % (ref 0–5)
IRON SATN MFR SERPL: 21 %
IRON SERPL-MCNC: 54 UG/DL (ref 35–150)
LYMPHOCYTES # BLD: 1.6 K/UL (ref 0.5–4.6)
LYMPHOCYTES NFR BLD: 23 % (ref 13–44)
MCH RBC QN AUTO: 24.8 PG (ref 26.1–32.9)
MCHC RBC AUTO-ENTMCNC: 31.5 G/DL (ref 31.4–35)
MCV RBC AUTO: 78.5 FL (ref 82–102)
MONOCYTES # BLD: 0.6 K/UL (ref 0.1–1.3)
MONOCYTES NFR BLD: 9 % (ref 4–12)
NEUTS SEG # BLD: 4.6 K/UL (ref 1.7–8.2)
NEUTS SEG NFR BLD: 65 % (ref 43–78)
NRBC # BLD: 0 K/UL (ref 0–0.2)
PLATELET # BLD AUTO: 237 K/UL (ref 150–450)
PMV BLD AUTO: 9.5 FL (ref 9.4–12.3)
POTASSIUM SERPL-SCNC: 5.2 MMOL/L (ref 3.5–5.1)
PROT SERPL-MCNC: 8 G/DL (ref 6.3–8.2)
RBC # BLD AUTO: 4.52 M/UL (ref 4.23–5.6)
SODIUM SERPL-SCNC: 138 MMOL/L (ref 136–146)
TIBC SERPL-MCNC: 262 UG/DL (ref 250–450)
VIT B12 SERPL-MCNC: 750 PG/ML (ref 193–986)
WBC # BLD AUTO: 6.9 K/UL (ref 4.3–11.1)

## 2024-03-12 PROCEDURE — 3077F SYST BP >= 140 MM HG: CPT | Performed by: INTERNAL MEDICINE

## 2024-03-12 PROCEDURE — 83550 IRON BINDING TEST: CPT

## 2024-03-12 PROCEDURE — 82728 ASSAY OF FERRITIN: CPT

## 2024-03-12 PROCEDURE — 83540 ASSAY OF IRON: CPT

## 2024-03-12 PROCEDURE — 82607 VITAMIN B-12: CPT

## 2024-03-12 PROCEDURE — 85025 COMPLETE CBC W/AUTO DIFF WBC: CPT

## 2024-03-12 PROCEDURE — 36415 COLL VENOUS BLD VENIPUNCTURE: CPT

## 2024-03-12 PROCEDURE — 80053 COMPREHEN METABOLIC PANEL: CPT

## 2024-03-12 PROCEDURE — 3079F DIAST BP 80-89 MM HG: CPT | Performed by: INTERNAL MEDICINE

## 2024-03-12 PROCEDURE — 99214 OFFICE O/P EST MOD 30 MIN: CPT | Performed by: INTERNAL MEDICINE

## 2024-03-12 ASSESSMENT — PATIENT HEALTH QUESTIONNAIRE - PHQ9
SUM OF ALL RESPONSES TO PHQ QUESTIONS 1-9: 0
2. FEELING DOWN, DEPRESSED OR HOPELESS: 0

## 2024-03-12 NOTE — PATIENT INSTRUCTIONS
Hospital Outpatient Visit on 03/12/2024   Component Date Value Ref Range Status    Vitamin B-12 03/12/2024 750  193 - 986 pg/mL Final    Iron 03/12/2024 54  35 - 150 ug/dL Final    Comment: Known Interfering Substances section:  \"Iron values may be falsely elevated in  serum samples from patients with  anticoagulants (e.g., hemodialysis patients).\"  Limitations of Procedure section:  \"Turbidity resulting from precipitation of  fibrinogen in the serum of patients treated  with anticoagulants (e.g. hemodialysis  patients) may cause spuriously elevated  iron results.\"      TIBC 03/12/2024 262  250 - 450 ug/dL Final    TRANSFERRIN SATURATION 03/12/2024 21  >20 % Final    Sodium 03/12/2024 138  136 - 146 mmol/L Final    Potassium 03/12/2024 5.2 (H)  3.5 - 5.1 mmol/L Final    Chloride 03/12/2024 105  103 - 113 mmol/L Final    CO2 03/12/2024 29  21 - 32 mmol/L Final    Anion Gap 03/12/2024 4  2 - 11 mmol/L Final    Glucose 03/12/2024 162 (H)  65 - 100 mg/dL Final    BUN 03/12/2024 20  8 - 23 MG/DL Final    Creatinine 03/12/2024 1.40  0.8 - 1.5 MG/DL Final    Est, Glom Filt Rate 03/12/2024 57 (L)  >60 ml/min/1.73m2 Final    Comment:    Pediatric calculator link: https://www.kidney.org/professionals/kdoqi/gfr_calculatorped     These results are not intended for use in patients <18 years of age.     eGFR results are calculated without a race factor using  the 2021 CKD-EPI equation. Careful clinical correlation is recommended, particularly when comparing to results calculated using previous equations.  The CKD-EPI equation is less accurate in patients with extremes of muscle mass, extra-renal metabolism of creatinine, excessive creatine ingestion, or following therapy that affects renal tubular secretion.      Calcium 03/12/2024 9.8  8.3 - 10.4 MG/DL Final    Total Bilirubin 03/12/2024 0.6  0.2 - 1.1 MG/DL Final    ALT 03/12/2024 16  12 - 65 U/L Final    AST 03/12/2024 23  15 - 37 U/L Final    Alk Phosphatase 03/12/2024 103

## 2024-06-14 DIAGNOSIS — D64.9 ANEMIA, UNSPECIFIED TYPE: Primary | ICD-10-CM

## 2024-06-17 NOTE — PROGRESS NOTES
[facility]: Office Visit Established Patient    Reason for follow up:    Microcytic anemia  Iron deficiency  B12 deficiency  Anemia of inflammation    Overview: (copied from prior 12/7/23)  Mr. Scales is a 63 years old gentleman, immigrant from [place] (moved to  5 years ago) with medical problems including type 2 diabetes mellitus and recently diagnosed pulmonary tuberculosis (currently being treated with [facility]) who has been referred for evaluation of microcytic anemia.  Patient was admitted to the hospital with cavitary pneumonia in September 2023.  Workup revealed tuberculosis, he was started on RIPE therapy on 9/15/2023.  Chart review indicates that patient has had a milder degree of anemia since February 2021 with hemoglobin in the range of 11-12 but has significantly worsened since admission to the hospital with hemoglobin now down to 8.9.  Patient notes significant fatigue and exertional dyspnea previously which appears to have improved since starting antituberculous therapy.  He denies heartburn, hematemesis, nausea, vomiting, melena, BRBPR.  Cough and sputum production are improving.  He denies pica, RLS.     2/6/24: normal EGD  FOBT negative  Colonoscopy: 11 polyps throughout the colon, size ranging between 3-10 mm, IH  Path: fragments of TVA    Interval history:    Interval history updated in the assessment and plan.        Review of Systems:  14 point ROS was negative except as per HPI      ECOG PERFORMANCE STATUS - 1- Restricted in physically strenuous activity but ambulatory and able to carry out work of a light or sedentary nature such as light house work, office work.     Pain - /10. None/Minimal pain - not affecting QOL       Past Medical History:   Diagnosis Date    Diabetes mellitus (HCC)     oral med. HgbA1c 6.0 on 1/10/24    Hyperlipidemia     Hypertension     Myasthenia gravis (HCC)     TB (pulmonary tuberculosis) 09/08/2023    Currently treated at the TB

## 2024-06-18 ENCOUNTER — HOSPITAL ENCOUNTER (OUTPATIENT)
Dept: LAB | Age: 64
Discharge: HOME | End: 2024-06-21
Payer: COMMERCIAL

## 2024-06-18 ENCOUNTER — OFFICE VISIT (OUTPATIENT)
Dept: ONCOLOGY | Age: 64
End: 2024-06-18
Payer: COMMERCIAL

## 2024-06-18 VITALS
WEIGHT: 147.6 LBS | DIASTOLIC BLOOD PRESSURE: 86 MMHG | TEMPERATURE: 97.6 F | HEIGHT: 70 IN | RESPIRATION RATE: 16 BRPM | SYSTOLIC BLOOD PRESSURE: 167 MMHG | HEART RATE: 86 BPM | BODY MASS INDEX: 21.13 KG/M2

## 2024-06-18 DIAGNOSIS — D64.9 ANEMIA, UNSPECIFIED TYPE: ICD-10-CM

## 2024-06-18 LAB
ALBUMIN SERPL-MCNC: 4 G/DL (ref 3.2–4.6)
ALBUMIN/GLOB SERPL: 1.1 (ref 1–1.9)
ALP SERPL-CCNC: 99 U/L (ref 40–129)
ALT SERPL-CCNC: 8 U/L (ref 12–65)
ANION GAP SERPL CALC-SCNC: 11 MMOL/L (ref 9–18)
AST SERPL-CCNC: 23 U/L (ref 15–37)
BASOPHILS # BLD: 0 K/UL (ref 0–0.2)
BASOPHILS NFR BLD: 1 % (ref 0–2)
BILIRUB SERPL-MCNC: 0.5 MG/DL (ref 0–1.2)
BUN SERPL-MCNC: 14 MG/DL (ref 8–23)
CALCIUM SERPL-MCNC: 9.9 MG/DL (ref 8.8–10.2)
CHLORIDE SERPL-SCNC: 101 MMOL/L (ref 98–107)
CO2 SERPL-SCNC: 25 MMOL/L (ref 20–28)
CREAT SERPL-MCNC: 1.03 MG/DL (ref 0.8–1.3)
DIFFERENTIAL METHOD BLD: ABNORMAL
EOSINOPHIL # BLD: 0.2 K/UL (ref 0–0.8)
EOSINOPHIL NFR BLD: 2 % (ref 0.5–7.8)
ERYTHROCYTE [DISTWIDTH] IN BLOOD BY AUTOMATED COUNT: 13.7 % (ref 11.9–14.6)
FERRITIN SERPL-MCNC: 57 NG/ML (ref 8–388)
GLOBULIN SER CALC-MCNC: 3.5 G/DL (ref 2.3–3.5)
GLUCOSE SERPL-MCNC: 150 MG/DL (ref 70–99)
HCT VFR BLD AUTO: 35.8 % (ref 41.1–50.3)
HGB BLD-MCNC: 11.4 G/DL (ref 13.6–17.2)
IMM GRANULOCYTES # BLD AUTO: 0.1 K/UL (ref 0–0.5)
IMM GRANULOCYTES NFR BLD AUTO: 1 % (ref 0–5)
IRON SATN MFR SERPL: 26 % (ref 20–50)
IRON SERPL-MCNC: 75 UG/DL (ref 35–100)
LYMPHOCYTES # BLD: 1.7 K/UL (ref 0.5–4.6)
LYMPHOCYTES NFR BLD: 22 % (ref 13–44)
MCH RBC QN AUTO: 27.5 PG (ref 26.1–32.9)
MCHC RBC AUTO-ENTMCNC: 31.8 G/DL (ref 31.4–35)
MCV RBC AUTO: 86.3 FL (ref 82–102)
MONOCYTES # BLD: 0.6 K/UL (ref 0.1–1.3)
MONOCYTES NFR BLD: 8 % (ref 4–12)
NEUTS SEG # BLD: 4.9 K/UL (ref 1.7–8.2)
NEUTS SEG NFR BLD: 66 % (ref 43–78)
NRBC # BLD: 0 K/UL (ref 0–0.2)
PLATELET # BLD AUTO: 308 K/UL (ref 150–450)
PMV BLD AUTO: 9.1 FL (ref 9.4–12.3)
POTASSIUM SERPL-SCNC: 5.5 MMOL/L (ref 3.5–5.1)
PROT SERPL-MCNC: 7.5 G/DL (ref 6.3–8.2)
RBC # BLD AUTO: 4.15 M/UL (ref 4.23–5.6)
SODIUM SERPL-SCNC: 137 MMOL/L (ref 136–145)
TIBC SERPL-MCNC: 287 UG/DL (ref 240–450)
UIBC SERPL-MCNC: 212 UG/DL (ref 112–347)
VIT B12 SERPL-MCNC: 818 PG/ML (ref 193–986)
WBC # BLD AUTO: 7.4 K/UL (ref 4.3–11.1)

## 2024-06-18 PROCEDURE — 99214 OFFICE O/P EST MOD 30 MIN: CPT | Performed by: INTERNAL MEDICINE

## 2024-06-18 PROCEDURE — 3079F DIAST BP 80-89 MM HG: CPT | Performed by: INTERNAL MEDICINE

## 2024-06-18 PROCEDURE — 3077F SYST BP >= 140 MM HG: CPT | Performed by: INTERNAL MEDICINE

## 2024-06-18 PROCEDURE — 80053 COMPREHEN METABOLIC PANEL: CPT

## 2024-06-18 PROCEDURE — 83540 ASSAY OF IRON: CPT

## 2024-06-18 PROCEDURE — 82728 ASSAY OF FERRITIN: CPT

## 2024-06-18 PROCEDURE — 36415 COLL VENOUS BLD VENIPUNCTURE: CPT

## 2024-06-18 PROCEDURE — 83550 IRON BINDING TEST: CPT

## 2024-06-18 PROCEDURE — 85025 COMPLETE CBC W/AUTO DIFF WBC: CPT

## 2024-06-18 PROCEDURE — 82607 VITAMIN B-12: CPT

## 2024-06-18 RX ORDER — CYANOCOBALAMIN 1000 UG/ML
INJECTION, SOLUTION INTRAMUSCULAR; SUBCUTANEOUS
Qty: 30 ML | Refills: 0
Start: 2024-06-18

## 2024-06-18 ASSESSMENT — PATIENT HEALTH QUESTIONNAIRE - PHQ9
SUM OF ALL RESPONSES TO PHQ QUESTIONS 1-9: 0
1. LITTLE INTEREST OR PLEASURE IN DOING THINGS: NOT AT ALL
SUM OF ALL RESPONSES TO PHQ QUESTIONS 1-9: 0
SUM OF ALL RESPONSES TO PHQ QUESTIONS 1-9: 0
2. FEELING DOWN, DEPRESSED OR HOPELESS: NOT AT ALL
SUM OF ALL RESPONSES TO PHQ QUESTIONS 1-9: 0
SUM OF ALL RESPONSES TO PHQ9 QUESTIONS 1 & 2: 0

## 2024-06-18 NOTE — PATIENT INSTRUCTIONS
Patient Information from Today's Visit    Diagnosis: Anemia      Follow Up Instructions:   - Labs reviewed  - Follow up with primary care regarding your blood pressure medication and potassium level  - Administer B12 injections once every 8 weeks    Follow up in 4 months with labs       Treatment Summary has been discussed and given to patient: N/A      Current Labs:   Hospital Outpatient Visit on 06/18/2024   Component Date Value Ref Range Status    Vitamin B-12 06/18/2024 818  193 - 986 pg/mL Final    Iron 06/18/2024 75  35 - 100 ug/dL Final    TIBC 06/18/2024 287  240 - 450 ug/dL Final    Iron % Saturation 06/18/2024 26  20 - 50 % Final    UIBC 06/18/2024 212.0  112.0 - 347.0 ug/dL Final    Ferritin 06/18/2024 57  8 - 388 NG/ML Final    WBC 06/18/2024 7.4  4.3 - 11.1 K/uL Final    RBC 06/18/2024 4.15 (L)  4.23 - 5.6 M/uL Final    Hemoglobin 06/18/2024 11.4 (L)  13.6 - 17.2 g/dL Final    Hematocrit 06/18/2024 35.8 (L)  41.1 - 50.3 % Final    MCV 06/18/2024 86.3  82.0 - 102.0 FL Final    MCH 06/18/2024 27.5  26.1 - 32.9 PG Final    MCHC 06/18/2024 31.8  31.4 - 35.0 g/dL Final    RDW 06/18/2024 13.7  11.9 - 14.6 % Final    Platelets 06/18/2024 308  150 - 450 K/uL Final    MPV 06/18/2024 9.1 (L)  9.4 - 12.3 FL Final    nRBC 06/18/2024 0.00  0.0 - 0.2 K/uL Final    **Note: Absolute NRBC parameter is now reported with Hemogram**    Neutrophils % 06/18/2024 66  43 - 78 % Final    Lymphocytes % 06/18/2024 22  13 - 44 % Final    Monocytes % 06/18/2024 8  4.0 - 12.0 % Final    Eosinophils % 06/18/2024 2  0.5 - 7.8 % Final    Basophils % 06/18/2024 1  0.0 - 2.0 % Final    Immature Granulocytes % 06/18/2024 1  0.0 - 5.0 % Final    Neutrophils Absolute 06/18/2024 4.9  1.7 - 8.2 K/UL Final    Lymphocytes Absolute 06/18/2024 1.7  0.5 - 4.6 K/UL Final    Monocytes Absolute 06/18/2024 0.6  0.1 - 1.3 K/UL Final    Eosinophils Absolute 06/18/2024 0.2  0.0 - 0.8 K/UL Final    Basophils Absolute 06/18/2024 0.0  0.0 - 0.2 K/UL Final

## 2024-10-11 ENCOUNTER — TELEPHONE (OUTPATIENT)
Dept: ONCOLOGY | Age: 64
End: 2024-10-11

## (undated) DEVICE — TRAP SPEC POLYP REM STRNR CLN DSGN MAGNIFYING WIND DISP

## (undated) DEVICE — AIRLIFE™ OXYGEN TUBING 7 FEET (2.1 M) CRUSH RESISTANT OXYGEN TUBING, VINYL TIPPED: Brand: AIRLIFE™

## (undated) DEVICE — SINGLE PORT MANIFOLD: Brand: NEPTUNE 2

## (undated) DEVICE — SYRINGE, LUER SLIP, STERILE, 60ML: Brand: MEDLINE

## (undated) DEVICE — LUBE JELLY FOIL PACK 1.4 OZ: Brand: MEDLINE INDUSTRIES, INC.

## (undated) DEVICE — ENDOSCOPIC KIT 1.1+ OP4 CA DE 2 GWN AAMI LEVEL 3

## (undated) DEVICE — CONTAINER FORMALIN PREFILLED 10% NBF 60ML

## (undated) DEVICE — MOUTHPIECE ENDOSCP L CTRL OPN AND SIDE PORTS DISP

## (undated) DEVICE — CONNECTOR TBNG OD5-7MM O2 END DISP

## (undated) DEVICE — BALLOON US E LIN RNG O KT FOR FG-32UA

## (undated) DEVICE — SYRINGE MED 10ML LUERLOCK TIP W/O SFTY DISP

## (undated) DEVICE — FORCEPS BX OVL CUP SERR DISP CAP L 240CM RAD JAW 4

## (undated) DEVICE — BLOCK BITE AD 60FR W/ VELC STRP ADDRESSES MOST PT AND

## (undated) DEVICE — KENDALL RADIOLUCENT FOAM MONITORING ELECTRODE RECTANGULAR SHAPE: Brand: KENDALL

## (undated) DEVICE — GAUZE,SPONGE,4"X4",12PLY,WOVEN,NS,LF: Brand: MEDLINE

## (undated) DEVICE — NEEDLE SYR 18GA L1.5IN RED PLAS HUB S STL BLNT FILL W/O

## (undated) DEVICE — SNARE POLYP SM W13MMXL240CM SHTH DIA2.4MM OVL FLX DISP

## (undated) DEVICE — SYRINGE MEDICAL 3ML CLEAR PLASTIC STANDARD NON CONTROL LUERLOCK TIP DISPOSABLE

## (undated) DEVICE — CANNULA NSL ORAL AD FOR CAPNOFLEX CO2 O2 AIRLFE